# Patient Record
Sex: FEMALE | Race: BLACK OR AFRICAN AMERICAN | NOT HISPANIC OR LATINO | URBAN - METROPOLITAN AREA
[De-identification: names, ages, dates, MRNs, and addresses within clinical notes are randomized per-mention and may not be internally consistent; named-entity substitution may affect disease eponyms.]

---

## 2018-07-09 ENCOUNTER — EMERGENCY (EMERGENCY)
Facility: HOSPITAL | Age: 78
LOS: 1 days | Discharge: HOME | End: 2018-07-09
Attending: EMERGENCY MEDICINE | Admitting: EMERGENCY MEDICINE
Payer: MEDICARE

## 2018-07-09 VITALS
HEART RATE: 106 BPM | SYSTOLIC BLOOD PRESSURE: 153 MMHG | RESPIRATION RATE: 18 BRPM | OXYGEN SATURATION: 100 % | DIASTOLIC BLOOD PRESSURE: 83 MMHG

## 2018-07-09 VITALS
SYSTOLIC BLOOD PRESSURE: 171 MMHG | HEART RATE: 102 BPM | OXYGEN SATURATION: 100 % | RESPIRATION RATE: 16 BRPM | DIASTOLIC BLOOD PRESSURE: 69 MMHG | TEMPERATURE: 98 F

## 2018-07-09 LAB
ALBUMIN SERPL ELPH-MCNC: 4.7 G/DL — SIGNIFICANT CHANGE UP (ref 3.3–5)
ALP SERPL-CCNC: 57 U/L — SIGNIFICANT CHANGE UP (ref 40–120)
ALT FLD-CCNC: 84 U/L — HIGH (ref 4–33)
APPEARANCE UR: SIGNIFICANT CHANGE UP
AST SERPL-CCNC: 57 U/L — HIGH (ref 4–32)
BACTERIA # UR AUTO: HIGH
BASE EXCESS BLDV CALC-SCNC: 4 MMOL/L — SIGNIFICANT CHANGE UP
BASOPHILS # BLD AUTO: 0.03 K/UL — SIGNIFICANT CHANGE UP (ref 0–0.2)
BASOPHILS NFR BLD AUTO: 0.4 % — SIGNIFICANT CHANGE UP (ref 0–2)
BILIRUB SERPL-MCNC: 0.6 MG/DL — SIGNIFICANT CHANGE UP (ref 0.2–1.2)
BILIRUB UR-MCNC: NEGATIVE — SIGNIFICANT CHANGE UP
BLOOD GAS VENOUS - CREATININE: 0.91 MG/DL — SIGNIFICANT CHANGE UP (ref 0.5–1.3)
BLOOD UR QL VISUAL: HIGH
BUN SERPL-MCNC: 33 MG/DL — HIGH (ref 7–23)
CALCIUM SERPL-MCNC: 10.2 MG/DL — SIGNIFICANT CHANGE UP (ref 8.4–10.5)
CHLORIDE BLDV-SCNC: 99 MMOL/L — SIGNIFICANT CHANGE UP (ref 96–108)
CHLORIDE SERPL-SCNC: 93 MMOL/L — LOW (ref 98–107)
CO2 SERPL-SCNC: 25 MMOL/L — SIGNIFICANT CHANGE UP (ref 22–31)
COLOR SPEC: YELLOW — SIGNIFICANT CHANGE UP
CREAT SERPL-MCNC: 0.98 MG/DL — SIGNIFICANT CHANGE UP (ref 0.5–1.3)
EOSINOPHIL # BLD AUTO: 0 K/UL — SIGNIFICANT CHANGE UP (ref 0–0.5)
EOSINOPHIL NFR BLD AUTO: 0 % — SIGNIFICANT CHANGE UP (ref 0–6)
GAS PNL BLDV: 135 MMOL/L — LOW (ref 136–146)
GLUCOSE BLDV-MCNC: 208 — HIGH (ref 70–99)
GLUCOSE SERPL-MCNC: 212 MG/DL — HIGH (ref 70–99)
GLUCOSE UR-MCNC: SIGNIFICANT CHANGE UP
HCO3 BLDV-SCNC: 28 MMOL/L — HIGH (ref 20–27)
HCT VFR BLD CALC: 41.4 % — SIGNIFICANT CHANGE UP (ref 34.5–45)
HCT VFR BLDV CALC: 45 % — SIGNIFICANT CHANGE UP (ref 34.5–45)
HGB BLD-MCNC: 13.9 G/DL — SIGNIFICANT CHANGE UP (ref 11.5–15.5)
HGB BLDV-MCNC: 14.7 G/DL — SIGNIFICANT CHANGE UP (ref 11.5–15.5)
IMM GRANULOCYTES # BLD AUTO: 0.04 # — SIGNIFICANT CHANGE UP
IMM GRANULOCYTES NFR BLD AUTO: 0.5 % — SIGNIFICANT CHANGE UP (ref 0–1.5)
KETONES UR-MCNC: SIGNIFICANT CHANGE UP
LACTATE BLDV-MCNC: 2.1 MMOL/L — HIGH (ref 0.5–2)
LEUKOCYTE ESTERASE UR-ACNC: HIGH
LIDOCAIN IGE QN: 15.7 U/L — SIGNIFICANT CHANGE UP (ref 7–60)
LYMPHOCYTES # BLD AUTO: 1.17 K/UL — SIGNIFICANT CHANGE UP (ref 1–3.3)
LYMPHOCYTES # BLD AUTO: 14.2 % — SIGNIFICANT CHANGE UP (ref 13–44)
MCHC RBC-ENTMCNC: 30.8 PG — SIGNIFICANT CHANGE UP (ref 27–34)
MCHC RBC-ENTMCNC: 33.6 % — SIGNIFICANT CHANGE UP (ref 32–36)
MCV RBC AUTO: 91.8 FL — SIGNIFICANT CHANGE UP (ref 80–100)
MONOCYTES # BLD AUTO: 0.51 K/UL — SIGNIFICANT CHANGE UP (ref 0–0.9)
MONOCYTES NFR BLD AUTO: 6.2 % — SIGNIFICANT CHANGE UP (ref 2–14)
MUCOUS THREADS # UR AUTO: SIGNIFICANT CHANGE UP
NEUTROPHILS # BLD AUTO: 6.48 K/UL — SIGNIFICANT CHANGE UP (ref 1.8–7.4)
NEUTROPHILS NFR BLD AUTO: 78.7 % — HIGH (ref 43–77)
NITRITE UR-MCNC: NEGATIVE — SIGNIFICANT CHANGE UP
NON-SQ EPI CELLS # UR AUTO: 2 — SIGNIFICANT CHANGE UP
NRBC # FLD: 0 — SIGNIFICANT CHANGE UP
PCO2 BLDV: 39 MMHG — LOW (ref 41–51)
PH BLDV: 7.46 PH — HIGH (ref 7.32–7.43)
PH UR: 5.5 — SIGNIFICANT CHANGE UP (ref 4.6–8)
PLATELET # BLD AUTO: 335 K/UL — SIGNIFICANT CHANGE UP (ref 150–400)
PMV BLD: 11.2 FL — SIGNIFICANT CHANGE UP (ref 7–13)
PO2 BLDV: 51 MMHG — HIGH (ref 35–40)
POTASSIUM BLDV-SCNC: 3.7 MMOL/L — SIGNIFICANT CHANGE UP (ref 3.4–4.5)
POTASSIUM SERPL-MCNC: 3.5 MMOL/L — SIGNIFICANT CHANGE UP (ref 3.5–5.3)
POTASSIUM SERPL-SCNC: 3.5 MMOL/L — SIGNIFICANT CHANGE UP (ref 3.5–5.3)
PROT SERPL-MCNC: 8.5 G/DL — HIGH (ref 6–8.3)
PROT UR-MCNC: 100 MG/DL — HIGH
RBC # BLD: 4.51 M/UL — SIGNIFICANT CHANGE UP (ref 3.8–5.2)
RBC # FLD: 12.3 % — SIGNIFICANT CHANGE UP (ref 10.3–14.5)
RBC CASTS # UR COMP ASSIST: SIGNIFICANT CHANGE UP (ref 0–?)
SAO2 % BLDV: 84.6 % — SIGNIFICANT CHANGE UP (ref 60–85)
SODIUM SERPL-SCNC: 135 MMOL/L — SIGNIFICANT CHANGE UP (ref 135–145)
SP GR SPEC: 1.03 — SIGNIFICANT CHANGE UP (ref 1–1.04)
TROPONIN T, HIGH SENSITIVITY: 14 NG/L — SIGNIFICANT CHANGE UP (ref ?–14)
UROBILINOGEN FLD QL: 1 MG/DL — SIGNIFICANT CHANGE UP
WBC # BLD: 8.23 K/UL — SIGNIFICANT CHANGE UP (ref 3.8–10.5)
WBC # FLD AUTO: 8.23 K/UL — SIGNIFICANT CHANGE UP (ref 3.8–10.5)
WBC UR QL: SIGNIFICANT CHANGE UP (ref 0–?)

## 2018-07-09 PROCEDURE — 99284 EMERGENCY DEPT VISIT MOD MDM: CPT | Mod: GC

## 2018-07-09 RX ORDER — FAMOTIDINE 10 MG/ML
20 INJECTION INTRAVENOUS ONCE
Qty: 0 | Refills: 0 | Status: COMPLETED | OUTPATIENT
Start: 2018-07-09 | End: 2018-07-09

## 2018-07-09 RX ORDER — ONDANSETRON 8 MG/1
4 TABLET, FILM COATED ORAL ONCE
Qty: 0 | Refills: 0 | Status: COMPLETED | OUTPATIENT
Start: 2018-07-09 | End: 2018-07-09

## 2018-07-09 RX ORDER — SODIUM CHLORIDE 9 MG/ML
1000 INJECTION INTRAMUSCULAR; INTRAVENOUS; SUBCUTANEOUS ONCE
Qty: 0 | Refills: 0 | Status: COMPLETED | OUTPATIENT
Start: 2018-07-09 | End: 2018-07-09

## 2018-07-09 RX ORDER — ACETAMINOPHEN 500 MG
1000 TABLET ORAL ONCE
Qty: 0 | Refills: 0 | Status: COMPLETED | OUTPATIENT
Start: 2018-07-09 | End: 2018-07-09

## 2018-07-09 RX ORDER — CEFTRIAXONE 500 MG/1
1 INJECTION, POWDER, FOR SOLUTION INTRAMUSCULAR; INTRAVENOUS ONCE
Qty: 0 | Refills: 0 | Status: COMPLETED | OUTPATIENT
Start: 2018-07-09 | End: 2018-07-09

## 2018-07-09 RX ADMIN — FAMOTIDINE 20 MILLIGRAM(S): 10 INJECTION INTRAVENOUS at 23:08

## 2018-07-09 RX ADMIN — Medication 1000 MILLIGRAM(S): at 23:01

## 2018-07-09 RX ADMIN — ONDANSETRON 4 MILLIGRAM(S): 8 TABLET, FILM COATED ORAL at 21:36

## 2018-07-09 RX ADMIN — SODIUM CHLORIDE 1000 MILLILITER(S): 9 INJECTION INTRAMUSCULAR; INTRAVENOUS; SUBCUTANEOUS at 21:36

## 2018-07-09 RX ADMIN — Medication 400 MILLIGRAM(S): at 21:36

## 2018-07-09 RX ADMIN — Medication 30 MILLILITER(S): at 23:08

## 2018-07-09 NOTE — ED PROVIDER NOTE - ATTENDING CONTRIBUTION TO CARE
Attending note:   After face to face evaluation of this patient, I concur with above noted hx, pe, and care plan for this patient.   78 y/o F with suprapubic pain and dysuria for several days along with nausea and rare gagging.   Abd soft except  suprapubic area.    evaluation in progress

## 2018-07-09 NOTE — ED PROVIDER NOTE - MEDICAL DECISION MAKING DETAILS
77F with vomiting, diarrhea and suprapubic pain on exam r/o uti. check labs, ivf, zofran, pepcid maalox.

## 2018-07-09 NOTE — ED ADULT NURSE NOTE - OBJECTIVE STATEMENT
Pt received in room 12, a/o x3. Pt comes in for c/o LUQ pain, lower abd pain and nvd for the last 4-5 days. Pt states inability to eat d/t she cant keep anything down, appears weak in room and just does not feel well. Pt appears in no acute distress, lower abd pain noted upon MD evaluation of pt and pt reporting that she has had some burning with urination which is not normal for her. IV access placed to L wrist 20G, labs sent and pt medicated per order. Pt currently awaiting further MD evaluation and results, will monitor and await further MD orders.

## 2018-07-09 NOTE — ED PROVIDER NOTE - OBJECTIVE STATEMENT
77F with hx of DM, HTN, HLD p/w vomiting for the past 4-5 days. patient c/o LUQ pain, epigastric pain. denies fever, chills, dysuria. last BM today, loose, watery and brown. no recent abx use. 77F with hx of DM, HTN, HLD p/w vomiting for the past 4-5 days. patient c/o LUQ pain, epigastric pain. denies fever, chills, dysuria. last BM today, loose, watery and brown. no recent abx use..

## 2018-07-10 LAB
BASE EXCESS BLDV CALC-SCNC: 3.8 MMOL/L — SIGNIFICANT CHANGE UP
BLOOD GAS VENOUS - CREATININE: 1.05 MG/DL — SIGNIFICANT CHANGE UP (ref 0.5–1.3)
CHLORIDE BLDV-SCNC: 101 MMOL/L — SIGNIFICANT CHANGE UP (ref 96–108)
GAS PNL BLDV: 134 MMOL/L — LOW (ref 136–146)
GLUCOSE BLDV-MCNC: 176 — HIGH (ref 70–99)
HCO3 BLDV-SCNC: 27 MMOL/L — SIGNIFICANT CHANGE UP (ref 20–27)
HCT VFR BLDV CALC: 46.9 % — HIGH (ref 34.5–45)
HGB BLDV-MCNC: 15.3 G/DL — SIGNIFICANT CHANGE UP (ref 11.5–15.5)
LACTATE BLDV-MCNC: 1.8 MMOL/L — SIGNIFICANT CHANGE UP (ref 0.5–2)
PCO2 BLDV: 43 MMHG — SIGNIFICANT CHANGE UP (ref 41–51)
PH BLDV: 7.42 PH — SIGNIFICANT CHANGE UP (ref 7.32–7.43)
PO2 BLDV: 45 MMHG — HIGH (ref 35–40)
POTASSIUM BLDV-SCNC: 3.2 MMOL/L — LOW (ref 3.4–4.5)
SAO2 % BLDV: 77.6 % — SIGNIFICANT CHANGE UP (ref 60–85)

## 2018-07-10 RX ORDER — CEPHALEXIN 500 MG
1 CAPSULE ORAL
Qty: 12 | Refills: 0 | OUTPATIENT
Start: 2018-07-10 | End: 2018-07-15

## 2018-07-10 RX ADMIN — CEFTRIAXONE 100 GRAM(S): 500 INJECTION, POWDER, FOR SOLUTION INTRAMUSCULAR; INTRAVENOUS at 00:12

## 2018-07-12 LAB — SPECIMEN SOURCE: SIGNIFICANT CHANGE UP

## 2018-07-13 LAB
-  AMIKACIN: SIGNIFICANT CHANGE UP
-  AMPICILLIN/SULBACTAM: SIGNIFICANT CHANGE UP
-  AMPICILLIN: SIGNIFICANT CHANGE UP
-  AZTREONAM: SIGNIFICANT CHANGE UP
-  CEFAZOLIN: SIGNIFICANT CHANGE UP
-  CEFEPIME: SIGNIFICANT CHANGE UP
-  CEFOXITIN: SIGNIFICANT CHANGE UP
-  CEFTAZIDIME: SIGNIFICANT CHANGE UP
-  CEFTRIAXONE: SIGNIFICANT CHANGE UP
-  CIPROFLOXACIN: SIGNIFICANT CHANGE UP
-  ERTAPENEM: SIGNIFICANT CHANGE UP
-  GENTAMICIN: SIGNIFICANT CHANGE UP
-  IMIPENEM: SIGNIFICANT CHANGE UP
-  LEVOFLOXACIN: SIGNIFICANT CHANGE UP
-  MEROPENEM: SIGNIFICANT CHANGE UP
-  NITROFURANTOIN: SIGNIFICANT CHANGE UP
-  PIPERACILLIN/TAZOBACTAM: SIGNIFICANT CHANGE UP
-  TIGECYCLINE: SIGNIFICANT CHANGE UP
-  TOBRAMYCIN: SIGNIFICANT CHANGE UP
-  TRIMETHOPRIM/SULFAMETHOXAZOLE: SIGNIFICANT CHANGE UP
BACTERIA UR CULT: SIGNIFICANT CHANGE UP
METHOD TYPE: SIGNIFICANT CHANGE UP
ORGANISM # SPEC MICROSCOPIC CNT: SIGNIFICANT CHANGE UP
ORGANISM # SPEC MICROSCOPIC CNT: SIGNIFICANT CHANGE UP

## 2019-04-24 ENCOUNTER — INPATIENT (INPATIENT)
Facility: HOSPITAL | Age: 79
LOS: 11 days | Discharge: NURSING, REHAB OR LONG-TERM CARE | End: 2019-05-06
Attending: SPECIALIST | Admitting: SPECIALIST
Payer: MEDICARE

## 2019-04-24 VITALS
SYSTOLIC BLOOD PRESSURE: 137 MMHG | HEART RATE: 125 BPM | OXYGEN SATURATION: 99 % | RESPIRATION RATE: 20 BRPM | TEMPERATURE: 98 F | DIASTOLIC BLOOD PRESSURE: 87 MMHG

## 2019-04-24 DIAGNOSIS — K56.609 UNSPECIFIED INTESTINAL OBSTRUCTION, UNSPECIFIED AS TO PARTIAL VERSUS COMPLETE OBSTRUCTION: ICD-10-CM

## 2019-04-24 PROBLEM — E78.5 HYPERLIPIDEMIA, UNSPECIFIED: Chronic | Status: ACTIVE | Noted: 2018-07-09

## 2019-04-24 PROBLEM — E11.9 TYPE 2 DIABETES MELLITUS WITHOUT COMPLICATIONS: Chronic | Status: ACTIVE | Noted: 2018-07-09

## 2019-04-24 PROBLEM — I10 ESSENTIAL (PRIMARY) HYPERTENSION: Chronic | Status: ACTIVE | Noted: 2018-07-09

## 2019-04-24 LAB
ALBUMIN SERPL ELPH-MCNC: 4 G/DL — SIGNIFICANT CHANGE UP (ref 3.3–5)
ALP SERPL-CCNC: 59 U/L — SIGNIFICANT CHANGE UP (ref 40–120)
ALT FLD-CCNC: 11 U/L — SIGNIFICANT CHANGE UP (ref 4–33)
ANION GAP SERPL CALC-SCNC: 19 MMO/L — HIGH (ref 7–14)
ANISOCYTOSIS BLD QL: SLIGHT — SIGNIFICANT CHANGE UP
APPEARANCE UR: SIGNIFICANT CHANGE UP
APTT BLD: 26.3 SEC — LOW (ref 27.5–36.3)
AST SERPL-CCNC: 14 U/L — SIGNIFICANT CHANGE UP (ref 4–32)
BACTERIA # UR AUTO: HIGH
BASE EXCESS BLDV CALC-SCNC: 5.4 MMOL/L — SIGNIFICANT CHANGE UP
BASE EXCESS BLDV CALC-SCNC: 8.6 MMOL/L — SIGNIFICANT CHANGE UP
BASOPHILS # BLD AUTO: 0.03 K/UL — SIGNIFICANT CHANGE UP (ref 0–0.2)
BASOPHILS # BLD AUTO: 0.03 K/UL — SIGNIFICANT CHANGE UP (ref 0–0.2)
BASOPHILS NFR BLD AUTO: 0.7 % — SIGNIFICANT CHANGE UP (ref 0–2)
BASOPHILS NFR BLD AUTO: 0.9 % — SIGNIFICANT CHANGE UP (ref 0–2)
BASOPHILS NFR SPEC: 0.9 % — SIGNIFICANT CHANGE UP (ref 0–2)
BILIRUB SERPL-MCNC: 0.5 MG/DL — SIGNIFICANT CHANGE UP (ref 0.2–1.2)
BILIRUB UR-MCNC: SIGNIFICANT CHANGE UP
BLASTS # FLD: 0 % — SIGNIFICANT CHANGE UP (ref 0–0)
BLD GP AB SCN SERPL QL: NEGATIVE — SIGNIFICANT CHANGE UP
BLOOD GAS VENOUS - CREATININE: 0.79 MG/DL — SIGNIFICANT CHANGE UP (ref 0.5–1.3)
BLOOD GAS VENOUS - CREATININE: 0.9 MG/DL — SIGNIFICANT CHANGE UP (ref 0.5–1.3)
BLOOD UR QL VISUAL: NEGATIVE — SIGNIFICANT CHANGE UP
BUN SERPL-MCNC: 37 MG/DL — HIGH (ref 7–23)
CALCIUM SERPL-MCNC: 9.5 MG/DL — SIGNIFICANT CHANGE UP (ref 8.4–10.5)
CHLORIDE BLDV-SCNC: 90 MMOL/L — LOW (ref 96–108)
CHLORIDE BLDV-SCNC: 92 MMOL/L — LOW (ref 96–108)
CHLORIDE SERPL-SCNC: 86 MMOL/L — LOW (ref 98–107)
CO2 SERPL-SCNC: 27 MMOL/L — SIGNIFICANT CHANGE UP (ref 22–31)
COLOR SPEC: YELLOW — SIGNIFICANT CHANGE UP
CREAT SERPL-MCNC: 0.9 MG/DL — SIGNIFICANT CHANGE UP (ref 0.5–1.3)
EOSINOPHIL # BLD AUTO: 0.01 K/UL — SIGNIFICANT CHANGE UP (ref 0–0.5)
EOSINOPHIL # BLD AUTO: 0.02 K/UL — SIGNIFICANT CHANGE UP (ref 0–0.5)
EOSINOPHIL NFR BLD AUTO: 0.3 % — SIGNIFICANT CHANGE UP (ref 0–6)
EOSINOPHIL NFR BLD AUTO: 0.5 % — SIGNIFICANT CHANGE UP (ref 0–6)
EOSINOPHIL NFR FLD: 0.9 % — SIGNIFICANT CHANGE UP (ref 0–6)
GAS PNL BLDV: 126 MMOL/L — LOW (ref 136–146)
GAS PNL BLDV: 132 MMOL/L — LOW (ref 136–146)
GIANT PLATELETS BLD QL SMEAR: PRESENT — SIGNIFICANT CHANGE UP
GLUCOSE BLDC GLUCOMTR-MCNC: 134 MG/DL — HIGH (ref 70–99)
GLUCOSE BLDV-MCNC: 117 — HIGH (ref 70–99)
GLUCOSE BLDV-MCNC: 187 — HIGH (ref 70–99)
GLUCOSE SERPL-MCNC: 183 MG/DL — HIGH (ref 70–99)
GLUCOSE UR-MCNC: NEGATIVE — SIGNIFICANT CHANGE UP
HCO3 BLDV-SCNC: 28 MMOL/L — HIGH (ref 20–27)
HCO3 BLDV-SCNC: 30 MMOL/L — HIGH (ref 20–27)
HCT VFR BLD CALC: 40.6 % — SIGNIFICANT CHANGE UP (ref 34.5–45)
HCT VFR BLD CALC: 45.4 % — HIGH (ref 34.5–45)
HCT VFR BLDV CALC: 40.3 % — SIGNIFICANT CHANGE UP (ref 34.5–45)
HCT VFR BLDV CALC: 46.2 % — HIGH (ref 34.5–45)
HGB BLD-MCNC: 13.5 G/DL — SIGNIFICANT CHANGE UP (ref 11.5–15.5)
HGB BLD-MCNC: 15 G/DL — SIGNIFICANT CHANGE UP (ref 11.5–15.5)
HGB BLDV-MCNC: 13.1 G/DL — SIGNIFICANT CHANGE UP (ref 11.5–15.5)
HGB BLDV-MCNC: 15.1 G/DL — SIGNIFICANT CHANGE UP (ref 11.5–15.5)
HYALINE CASTS # UR AUTO: HIGH
IMM GRANULOCYTES NFR BLD AUTO: 0 % — SIGNIFICANT CHANGE UP (ref 0–1.5)
IMM GRANULOCYTES NFR BLD AUTO: 0.3 % — SIGNIFICANT CHANGE UP (ref 0–1.5)
INR BLD: 1.03 — SIGNIFICANT CHANGE UP (ref 0.88–1.17)
KETONES UR-MCNC: HIGH
LACTATE BLDV-MCNC: 1.4 MMOL/L — SIGNIFICANT CHANGE UP (ref 0.5–2)
LACTATE BLDV-MCNC: 2.9 MMOL/L — HIGH (ref 0.5–2)
LEUKOCYTE ESTERASE UR-ACNC: NEGATIVE — SIGNIFICANT CHANGE UP
LYMPHOCYTES # BLD AUTO: 1 K/UL — SIGNIFICANT CHANGE UP (ref 1–3.3)
LYMPHOCYTES # BLD AUTO: 1.11 K/UL — SIGNIFICANT CHANGE UP (ref 1–3.3)
LYMPHOCYTES # BLD AUTO: 25.4 % — SIGNIFICANT CHANGE UP (ref 13–44)
LYMPHOCYTES # BLD AUTO: 29.3 % — SIGNIFICANT CHANGE UP (ref 13–44)
LYMPHOCYTES NFR SPEC AUTO: 25.6 % — SIGNIFICANT CHANGE UP (ref 13–44)
MACROCYTES BLD QL: SLIGHT — SIGNIFICANT CHANGE UP
MCHC RBC-ENTMCNC: 30.7 PG — SIGNIFICANT CHANGE UP (ref 27–34)
MCHC RBC-ENTMCNC: 31.3 PG — SIGNIFICANT CHANGE UP (ref 27–34)
MCHC RBC-ENTMCNC: 33 % — SIGNIFICANT CHANGE UP (ref 32–36)
MCHC RBC-ENTMCNC: 33.3 % — SIGNIFICANT CHANGE UP (ref 32–36)
MCV RBC AUTO: 93 FL — SIGNIFICANT CHANGE UP (ref 80–100)
MCV RBC AUTO: 94 FL — SIGNIFICANT CHANGE UP (ref 80–100)
METAMYELOCYTES # FLD: 0 % — SIGNIFICANT CHANGE UP (ref 0–1)
MONOCYTES # BLD AUTO: 0.57 K/UL — SIGNIFICANT CHANGE UP (ref 0–0.9)
MONOCYTES # BLD AUTO: 0.7 K/UL — SIGNIFICANT CHANGE UP (ref 0–0.9)
MONOCYTES NFR BLD AUTO: 16 % — HIGH (ref 2–14)
MONOCYTES NFR BLD AUTO: 16.7 % — HIGH (ref 2–14)
MONOCYTES NFR BLD: 19.5 % — HIGH (ref 2–9)
MYELOCYTES NFR BLD: 0 % — SIGNIFICANT CHANGE UP (ref 0–0)
NEUTROPHIL AB SER-ACNC: 19.5 % — LOW (ref 43–77)
NEUTROPHILS # BLD AUTO: 1.79 K/UL — LOW (ref 1.8–7.4)
NEUTROPHILS # BLD AUTO: 2.51 K/UL — SIGNIFICANT CHANGE UP (ref 1.8–7.4)
NEUTROPHILS NFR BLD AUTO: 52.5 % — SIGNIFICANT CHANGE UP (ref 43–77)
NEUTROPHILS NFR BLD AUTO: 57.4 % — SIGNIFICANT CHANGE UP (ref 43–77)
NEUTS BAND # BLD: 29.2 % — HIGH (ref 0–6)
NITRITE UR-MCNC: POSITIVE — HIGH
NRBC # FLD: 0 K/UL — SIGNIFICANT CHANGE UP (ref 0–0)
NRBC # FLD: 0 K/UL — SIGNIFICANT CHANGE UP (ref 0–0)
OTHER - HEMATOLOGY %: 0 — SIGNIFICANT CHANGE UP
OVALOCYTES BLD QL SMEAR: SIGNIFICANT CHANGE UP
PCO2 BLDV: 46 MMHG — SIGNIFICANT CHANGE UP (ref 41–51)
PCO2 BLDV: 49 MMHG — SIGNIFICANT CHANGE UP (ref 41–51)
PH BLDV: 7.4 PH — SIGNIFICANT CHANGE UP (ref 7.32–7.43)
PH BLDV: 7.47 PH — HIGH (ref 7.32–7.43)
PH UR: 6 — SIGNIFICANT CHANGE UP (ref 5–8)
PLATELET # BLD AUTO: 353 K/UL — SIGNIFICANT CHANGE UP (ref 150–400)
PLATELET # BLD AUTO: 431 K/UL — HIGH (ref 150–400)
PLATELET COUNT - ESTIMATE: NORMAL — SIGNIFICANT CHANGE UP
PMV BLD: 10.4 FL — SIGNIFICANT CHANGE UP (ref 7–13)
PMV BLD: 11 FL — SIGNIFICANT CHANGE UP (ref 7–13)
PO2 BLDV: 48 MMHG — HIGH (ref 35–40)
PO2 BLDV: < 24 MMHG — LOW (ref 35–40)
POIKILOCYTOSIS BLD QL AUTO: SLIGHT — SIGNIFICANT CHANGE UP
POTASSIUM BLDV-SCNC: 3.2 MMOL/L — LOW (ref 3.4–4.5)
POTASSIUM BLDV-SCNC: 3.8 MMOL/L — SIGNIFICANT CHANGE UP (ref 3.4–4.5)
POTASSIUM SERPL-MCNC: 3.7 MMOL/L — SIGNIFICANT CHANGE UP (ref 3.5–5.3)
POTASSIUM SERPL-SCNC: 3.7 MMOL/L — SIGNIFICANT CHANGE UP (ref 3.5–5.3)
PROMYELOCYTES # FLD: 0 % — SIGNIFICANT CHANGE UP (ref 0–0)
PROT SERPL-MCNC: 7.2 G/DL — SIGNIFICANT CHANGE UP (ref 6–8.3)
PROT UR-MCNC: 50 — SIGNIFICANT CHANGE UP
PROTHROM AB SERPL-ACNC: 11.8 SEC — SIGNIFICANT CHANGE UP (ref 9.8–13.1)
RBC # BLD: 4.32 M/UL — SIGNIFICANT CHANGE UP (ref 3.8–5.2)
RBC # BLD: 4.88 M/UL — SIGNIFICANT CHANGE UP (ref 3.8–5.2)
RBC # FLD: 12.5 % — SIGNIFICANT CHANGE UP (ref 10.3–14.5)
RBC # FLD: 12.7 % — SIGNIFICANT CHANGE UP (ref 10.3–14.5)
RBC CASTS # UR COMP ASSIST: HIGH (ref 0–?)
REVIEW TO FOLLOW: YES — SIGNIFICANT CHANGE UP
RH IG SCN BLD-IMP: POSITIVE — SIGNIFICANT CHANGE UP
SAO2 % BLDV: 35.4 % — LOW (ref 60–85)
SAO2 % BLDV: 79.4 % — SIGNIFICANT CHANGE UP (ref 60–85)
SODIUM SERPL-SCNC: 132 MMOL/L — LOW (ref 135–145)
SP GR SPEC: 1.03 — SIGNIFICANT CHANGE UP (ref 1–1.04)
SQUAMOUS # UR AUTO: SIGNIFICANT CHANGE UP
UROBILINOGEN FLD QL: HIGH
VARIANT LYMPHS # BLD: 4.4 % — SIGNIFICANT CHANGE UP
WBC # BLD: 3.41 K/UL — LOW (ref 3.8–10.5)
WBC # BLD: 4.37 K/UL — SIGNIFICANT CHANGE UP (ref 3.8–10.5)
WBC # FLD AUTO: 3.41 K/UL — LOW (ref 3.8–10.5)
WBC # FLD AUTO: 4.37 K/UL — SIGNIFICANT CHANGE UP (ref 3.8–10.5)
WBC UR QL: HIGH (ref 0–?)

## 2019-04-24 PROCEDURE — 74177 CT ABD & PELVIS W/CONTRAST: CPT | Mod: 26

## 2019-04-24 RX ORDER — HEPARIN SODIUM 5000 [USP'U]/ML
5000 INJECTION INTRAVENOUS; SUBCUTANEOUS EVERY 8 HOURS
Qty: 0 | Refills: 0 | Status: DISCONTINUED | OUTPATIENT
Start: 2019-04-24 | End: 2019-04-30

## 2019-04-24 RX ORDER — SODIUM CHLORIDE 9 MG/ML
1000 INJECTION, SOLUTION INTRAVENOUS
Qty: 0 | Refills: 0 | Status: DISCONTINUED | OUTPATIENT
Start: 2019-04-24 | End: 2019-04-26

## 2019-04-24 RX ORDER — PIPERACILLIN AND TAZOBACTAM 4; .5 G/20ML; G/20ML
3.38 INJECTION, POWDER, LYOPHILIZED, FOR SOLUTION INTRAVENOUS EVERY 8 HOURS
Qty: 0 | Refills: 0 | Status: COMPLETED | OUTPATIENT
Start: 2019-04-24 | End: 2019-04-27

## 2019-04-24 RX ORDER — INSULIN LISPRO 100/ML
VIAL (ML) SUBCUTANEOUS EVERY 6 HOURS
Qty: 0 | Refills: 0 | Status: DISCONTINUED | OUTPATIENT
Start: 2019-04-24 | End: 2019-05-02

## 2019-04-24 RX ORDER — PIPERACILLIN AND TAZOBACTAM 4; .5 G/20ML; G/20ML
3.38 INJECTION, POWDER, LYOPHILIZED, FOR SOLUTION INTRAVENOUS ONCE
Qty: 0 | Refills: 0 | Status: COMPLETED | OUTPATIENT
Start: 2019-04-24 | End: 2019-04-24

## 2019-04-24 RX ORDER — SODIUM CHLORIDE 9 MG/ML
1000 INJECTION INTRAMUSCULAR; INTRAVENOUS; SUBCUTANEOUS ONCE
Qty: 0 | Refills: 0 | Status: COMPLETED | OUTPATIENT
Start: 2019-04-24 | End: 2019-04-24

## 2019-04-24 RX ORDER — INFLUENZA VIRUS VACCINE 15; 15; 15; 15 UG/.5ML; UG/.5ML; UG/.5ML; UG/.5ML
0.5 SUSPENSION INTRAMUSCULAR ONCE
Qty: 0 | Refills: 0 | Status: DISCONTINUED | OUTPATIENT
Start: 2019-04-24 | End: 2019-05-06

## 2019-04-24 RX ORDER — DEXTROSE 50 % IN WATER 50 %
12.5 SYRINGE (ML) INTRAVENOUS ONCE
Qty: 0 | Refills: 0 | Status: DISCONTINUED | OUTPATIENT
Start: 2019-04-24 | End: 2019-04-27

## 2019-04-24 RX ORDER — SODIUM CHLORIDE 9 MG/ML
1000 INJECTION, SOLUTION INTRAVENOUS
Qty: 0 | Refills: 0 | Status: DISCONTINUED | OUTPATIENT
Start: 2019-04-24 | End: 2019-04-27

## 2019-04-24 RX ORDER — METOPROLOL TARTRATE 50 MG
5 TABLET ORAL EVERY 6 HOURS
Qty: 0 | Refills: 0 | Status: DISCONTINUED | OUTPATIENT
Start: 2019-04-24 | End: 2019-05-03

## 2019-04-24 RX ORDER — DEXTROSE 50 % IN WATER 50 %
15 SYRINGE (ML) INTRAVENOUS ONCE
Qty: 0 | Refills: 0 | Status: DISCONTINUED | OUTPATIENT
Start: 2019-04-24 | End: 2019-04-27

## 2019-04-24 RX ORDER — MORPHINE SULFATE 50 MG/1
4 CAPSULE, EXTENDED RELEASE ORAL ONCE
Qty: 0 | Refills: 0 | Status: DISCONTINUED | OUTPATIENT
Start: 2019-04-24 | End: 2019-04-24

## 2019-04-24 RX ORDER — ONDANSETRON 8 MG/1
4 TABLET, FILM COATED ORAL ONCE
Qty: 0 | Refills: 0 | Status: COMPLETED | OUTPATIENT
Start: 2019-04-24 | End: 2019-04-24

## 2019-04-24 RX ORDER — DEXTROSE 50 % IN WATER 50 %
25 SYRINGE (ML) INTRAVENOUS ONCE
Qty: 0 | Refills: 0 | Status: DISCONTINUED | OUTPATIENT
Start: 2019-04-24 | End: 2019-04-27

## 2019-04-24 RX ORDER — GLUCAGON INJECTION, SOLUTION 0.5 MG/.1ML
1 INJECTION, SOLUTION SUBCUTANEOUS ONCE
Qty: 0 | Refills: 0 | Status: DISCONTINUED | OUTPATIENT
Start: 2019-04-24 | End: 2019-04-27

## 2019-04-24 RX ADMIN — PIPERACILLIN AND TAZOBACTAM 25 GRAM(S): 4; .5 INJECTION, POWDER, LYOPHILIZED, FOR SOLUTION INTRAVENOUS at 21:58

## 2019-04-24 RX ADMIN — HEPARIN SODIUM 5000 UNIT(S): 5000 INJECTION INTRAVENOUS; SUBCUTANEOUS at 21:54

## 2019-04-24 RX ADMIN — ONDANSETRON 4 MILLIGRAM(S): 8 TABLET, FILM COATED ORAL at 12:06

## 2019-04-24 RX ADMIN — SODIUM CHLORIDE 1000 MILLILITER(S): 9 INJECTION INTRAMUSCULAR; INTRAVENOUS; SUBCUTANEOUS at 12:06

## 2019-04-24 RX ADMIN — PIPERACILLIN AND TAZOBACTAM 200 GRAM(S): 4; .5 INJECTION, POWDER, LYOPHILIZED, FOR SOLUTION INTRAVENOUS at 13:11

## 2019-04-24 RX ADMIN — MORPHINE SULFATE 4 MILLIGRAM(S): 50 CAPSULE, EXTENDED RELEASE ORAL at 17:33

## 2019-04-24 NOTE — ED ADULT NURSE REASSESSMENT NOTE - NS ED NURSE REASSESS COMMENT FT1
Patient had NGT placed by YANDEL Reyes. NGT to low-wall suction, via right nares. Patient given morphine 4mg for pain relief. Patient awaiting surgery consult, will continue to monitor patient.

## 2019-04-24 NOTE — ED PROVIDER NOTE - OBJECTIVE STATEMENT
Pt is 77 y/o F nonsmoker PMHx DM, HTN, HLD p/w abdominal pain x 2 weeks.  Pt notes initially waxing and waning moderate intensity epigastric and left lower abdominal cramping pain for past 2 weeks, becoming more constant and severe up to 8/10 for past 3 days associated with nausea and one episode of nonbloody nonbilious vomiting yesterday.  Pt also notes 2 weeks of burning dysuria.  Pt notes intermittent diarrhea but normal bowel movement today.  Pt denies any fevers, chills, chest pain, SOB, flank pain, back pain, vaginal discharge, vaginal bleeding, lightheadedness.  Pt's daughter notes ~50-60 lb weight loss over several months 2/2 decreased PO intake.

## 2019-04-24 NOTE — H&P ADULT - NSHPLABSRESULTS_GEN_ALL_CORE
13.5   3.41  )-----------( 353      ( 2019 18:58 )             40.6                 PT/INR - ( 2019 12:02 )   PT: 11.8 SEC;   INR: 1.03          PTT - ( 2019 12:02 )  PTT:26.3 SEC        132<L>  |  86<L>  |  37<H>  ----------------------------<  183<H>  3.7   |  27  |  0.90    Ca    9.5      2019 11:40    TPro  7.2  /  Alb  4.0  /  TBili  0.5  /  DBili  x   /  AST  14  /  ALT  11  /  AlkPhos  59        LIVER FUNCTIONS - ( 2019 11:40 )  Alb: 4.0 g/dL / Pro: 7.2 g/dL / ALK PHOS: 59 u/L / ALT: 11 u/L / AST: 14 u/L / GGT: x             Urinalysis Basic - ( 2019 13:20 )    Color: YELLOW / Appearance: Lt TURBID / S.030 / pH: 6.0  Gluc: NEGATIVE / Ketone: MODERATE  / Bili: TRACE / Urobili: SMALL   Blood: NEGATIVE / Protein: 50 / Nitrite: POSITIVE   Leuk Esterase: NEGATIVE / RBC: 6-10 / WBC 6-10   Sq Epi: FEW / Non Sq Epi: x / Bacteria: MANY        IMAGING  < from: CT Abdomen and Pelvis w/ Oral Cont and w/ IV Cont (19 @ 14:47) >    IMPRESSION:  Diffuse small bowel dilatation and fluid-filled distention of the colon   to the level of the distal descending colon, possibly large bowel   obstruction with incompetent ileocecal valve. Possible stricture at the   level of the proximal sigmoid colon.    Sigmoid diverticulosis with intraluminal hyperdense material, possibly  active extravasation of contrast in the setting of gastrointestinal   bleed; an alternative consideration is mucosal enhancement in the setting   of colitis.    < end of copied text >

## 2019-04-24 NOTE — H&P ADULT - PROBLEM SELECTOR PLAN 1
- Admit to Surgery  - NPO/IVF  - NGT to LCWS, f/u chest XR to confirm adequate placement.   - Serial abdominal examinations  - Consultation with GI for flexible sigmoidoscopy, +/- biopsy

## 2019-04-24 NOTE — ED ADULT NURSE NOTE - NSIMPLEMENTINTERV_GEN_ALL_ED
Implemented All Fall with Harm Risk Interventions:  [place] to call system. Call bell, personal items and telephone within reach. Instruct patient to call for assistance. Room bathroom lighting operational. Non-slip footwear when patient is off stretcher. Physically safe environment: no spills, clutter or unnecessary equipment. Stretcher in lowest position, wheels locked, appropriate side rails in place. Provide visual cue, wrist band, yellow gown, etc. Monitor gait and stability. Monitor for mental status changes and reorient to person, place, and time. Review medications for side effects contributing to fall risk. Reinforce activity limits and safety measures with patient and family. Provide visual clues: red socks.

## 2019-04-24 NOTE — ED ADULT NURSE NOTE - OBJECTIVE STATEMENT
Patient reports decreased PO intake, nausea/vomiting and abd pain for 1 week. Patient reports last BM yesterday, patient cannot describe BM, "I wasn't paying attention." Patient appears NAD, no cyanosis or palor noted. Patient denies any hx of abd sx. Patient is tachycardic on assessment, placed on continuous cardiac monitor. Patient's labs drawn, #20g placed into right AC, IVF infusing. Patient pending further evaluation by MD.

## 2019-04-24 NOTE — H&P ADULT - HISTORY OF PRESENT ILLNESS
78y female - h/o of DM, HTN, HLD - presenting with crampy abdominal pain, persistent nausea, anorexia, and intermittent vomiting which has been slowly progressing over the preceding two weeks. The patient had her first colonoscopy "a few years ago" by a Dr. Acevedo at [facility]. Based upon the description offered during history-taking, it sounds as though she may have had polyps removed during this colonoscopy, and she was supposed to get a repeat in "3 years." She does report unintentional weight loss over the past several months of roughly 20 lbs. She denies any narrow-caliber stools, any bloody or dark stools. She denies any historical episodes of diverticulitis/unexplained abdominal pain.    Patient is able to tolerate at least 4 METs of activity without any chest pain/SOB or significant fatigue.

## 2019-04-24 NOTE — H&P ADULT - ASSESSMENT
78y female with partially obstructing lesion of the sigmoid colon - mass vs. diverticular stricture. Abdomen is benign, patient is non-toxic.

## 2019-04-24 NOTE — ED ADULT TRIAGE NOTE - CHIEF COMPLAINT QUOTE
Pt with increased fatigue , left side abdominal pain x 3 days nausea and vomiting  , not eating . Pt tachycardiac in triage.

## 2019-04-24 NOTE — ED PROVIDER NOTE - CLINICAL SUMMARY MEDICAL DECISION MAKING FREE TEXT BOX
Pt is 77 y/o F nonsmoker PMHx DM, HTN, HLD p/w abdominal pain x 2 weeks -- possible diverticulitis, possible gastroenteritis, possible UTI -- labs, ua, ucx, iv fluids, ct abd and pelvis

## 2019-04-24 NOTE — H&P ADULT - NSHPPHYSICALEXAM_GEN_ALL_CORE
Vital Signs Last 24 Hrs  T(C): 36.5 (25 Apr 2019 01:50), Max: 36.6 (24 Apr 2019 11:12)  T(F): 97.7 (25 Apr 2019 01:50), Max: 97.9 (24 Apr 2019 11:12)  HR: 76 (25 Apr 2019 01:50) (76 - 125)  BP: 117/60 (25 Apr 2019 01:50) (115/62 - 144/57)  BP(mean): --  RR: 19 (25 Apr 2019 01:50) (18 - 20)  SpO2: 94% (25 Apr 2019 01:50) (94% - 100%)    GEN: NAD, alert and oriented x 3  HEENT: WNL  CHEST: Symmetrical chest rise, no increased work of breathing, no stridor  HEART: RRR, non-muffled heart sounds  ABD: Softly distended, non-tender, no involuntary guarding.  EXT: No erythema/edema. Warm, sensate, motor function intact

## 2019-04-24 NOTE — ED PROVIDER NOTE - ATTENDING CONTRIBUTION TO CARE
78F h/o DM, HTN, HLD presents with intermittent LLQ pain and epigastric abd pain. Reports nausea and one episode of brown emesis. Normal BM yesterday. No fever. + dysuria. Reports decreased PO intake for several months. On exam well appearing, nad, mmm, rrr, lungs clear, abd LLQ ttp, 2+ pulses, no edema, no rash, alert, speech clear. Plan for labs, UA and CT abd.

## 2019-04-24 NOTE — ED PROVIDER NOTE - PROGRESS NOTE DETAILS
YANDEL HOYT:  General surgery consulted for concern for large bowel obstruction who recommends NG tube at this time. Arnulfo:  Patient signed out to me pending surgery eval.  Surgery will admit patient.

## 2019-04-25 DIAGNOSIS — Z71.89 OTHER SPECIFIED COUNSELING: ICD-10-CM

## 2019-04-25 DIAGNOSIS — I10 ESSENTIAL (PRIMARY) HYPERTENSION: ICD-10-CM

## 2019-04-25 DIAGNOSIS — K56.609 UNSPECIFIED INTESTINAL OBSTRUCTION, UNSPECIFIED AS TO PARTIAL VERSUS COMPLETE OBSTRUCTION: ICD-10-CM

## 2019-04-25 DIAGNOSIS — E11.9 TYPE 2 DIABETES MELLITUS WITHOUT COMPLICATIONS: ICD-10-CM

## 2019-04-25 DIAGNOSIS — K56.690 OTHER PARTIAL INTESTINAL OBSTRUCTION: ICD-10-CM

## 2019-04-25 LAB
ANION GAP SERPL CALC-SCNC: 11 MMO/L — SIGNIFICANT CHANGE UP (ref 7–14)
BUN SERPL-MCNC: 26 MG/DL — HIGH (ref 7–23)
CALCIUM SERPL-MCNC: 8.8 MG/DL — SIGNIFICANT CHANGE UP (ref 8.4–10.5)
CHLORIDE SERPL-SCNC: 92 MMOL/L — LOW (ref 98–107)
CO2 SERPL-SCNC: 33 MMOL/L — HIGH (ref 22–31)
CREAT SERPL-MCNC: 0.82 MG/DL — SIGNIFICANT CHANGE UP (ref 0.5–1.3)
GLUCOSE BLDC GLUCOMTR-MCNC: 106 MG/DL — HIGH (ref 70–99)
GLUCOSE BLDC GLUCOMTR-MCNC: 109 MG/DL — HIGH (ref 70–99)
GLUCOSE BLDC GLUCOMTR-MCNC: 113 MG/DL — HIGH (ref 70–99)
GLUCOSE BLDC GLUCOMTR-MCNC: 129 MG/DL — HIGH (ref 70–99)
GLUCOSE SERPL-MCNC: 136 MG/DL — HIGH (ref 70–99)
HBA1C BLD-MCNC: 6.8 % — HIGH (ref 4–5.6)
HCT VFR BLD CALC: 37.3 % — SIGNIFICANT CHANGE UP (ref 34.5–45)
HGB BLD-MCNC: 12.4 G/DL — SIGNIFICANT CHANGE UP (ref 11.5–15.5)
MAGNESIUM SERPL-MCNC: 2 MG/DL — SIGNIFICANT CHANGE UP (ref 1.6–2.6)
MCHC RBC-ENTMCNC: 30.9 PG — SIGNIFICANT CHANGE UP (ref 27–34)
MCHC RBC-ENTMCNC: 33.2 % — SIGNIFICANT CHANGE UP (ref 32–36)
MCV RBC AUTO: 93 FL — SIGNIFICANT CHANGE UP (ref 80–100)
NRBC # FLD: 0.04 K/UL — SIGNIFICANT CHANGE UP (ref 0–0)
NRBC FLD-RTO: 1.1 — SIGNIFICANT CHANGE UP
PLATELET # BLD AUTO: 311 K/UL — SIGNIFICANT CHANGE UP (ref 150–400)
PMV BLD: 10.9 FL — SIGNIFICANT CHANGE UP (ref 7–13)
POTASSIUM SERPL-MCNC: 3.7 MMOL/L — SIGNIFICANT CHANGE UP (ref 3.5–5.3)
POTASSIUM SERPL-SCNC: 3.7 MMOL/L — SIGNIFICANT CHANGE UP (ref 3.5–5.3)
RBC # BLD: 4.01 M/UL — SIGNIFICANT CHANGE UP (ref 3.8–5.2)
RBC # FLD: 12.9 % — SIGNIFICANT CHANGE UP (ref 10.3–14.5)
SODIUM SERPL-SCNC: 136 MMOL/L — SIGNIFICANT CHANGE UP (ref 135–145)
SPECIMEN SOURCE: SIGNIFICANT CHANGE UP
WBC # BLD: 3.62 K/UL — LOW (ref 3.8–10.5)
WBC # FLD AUTO: 3.62 K/UL — LOW (ref 3.8–10.5)

## 2019-04-25 PROCEDURE — 71045 X-RAY EXAM CHEST 1 VIEW: CPT | Mod: 26

## 2019-04-25 RX ORDER — METOPROLOL TARTRATE 50 MG
5 TABLET ORAL EVERY 6 HOURS
Qty: 0 | Refills: 0 | Status: DISCONTINUED | OUTPATIENT
Start: 2019-04-25 | End: 2019-04-25

## 2019-04-25 RX ORDER — ACETAMINOPHEN 500 MG
1000 TABLET ORAL ONCE
Qty: 0 | Refills: 0 | Status: COMPLETED | OUTPATIENT
Start: 2019-04-25 | End: 2019-04-25

## 2019-04-25 RX ADMIN — HEPARIN SODIUM 5000 UNIT(S): 5000 INJECTION INTRAVENOUS; SUBCUTANEOUS at 21:27

## 2019-04-25 RX ADMIN — HEPARIN SODIUM 5000 UNIT(S): 5000 INJECTION INTRAVENOUS; SUBCUTANEOUS at 05:07

## 2019-04-25 RX ADMIN — PIPERACILLIN AND TAZOBACTAM 25 GRAM(S): 4; .5 INJECTION, POWDER, LYOPHILIZED, FOR SOLUTION INTRAVENOUS at 13:07

## 2019-04-25 RX ADMIN — Medication 5 MILLIGRAM(S): at 00:11

## 2019-04-25 RX ADMIN — Medication 5 MILLIGRAM(S): at 23:47

## 2019-04-25 RX ADMIN — PIPERACILLIN AND TAZOBACTAM 25 GRAM(S): 4; .5 INJECTION, POWDER, LYOPHILIZED, FOR SOLUTION INTRAVENOUS at 05:07

## 2019-04-25 RX ADMIN — Medication 5 MILLIGRAM(S): at 12:38

## 2019-04-25 RX ADMIN — Medication 5 MILLIGRAM(S): at 18:10

## 2019-04-25 RX ADMIN — Medication 5 MILLIGRAM(S): at 05:07

## 2019-04-25 RX ADMIN — PIPERACILLIN AND TAZOBACTAM 25 GRAM(S): 4; .5 INJECTION, POWDER, LYOPHILIZED, FOR SOLUTION INTRAVENOUS at 21:27

## 2019-04-25 RX ADMIN — HEPARIN SODIUM 5000 UNIT(S): 5000 INJECTION INTRAVENOUS; SUBCUTANEOUS at 13:29

## 2019-04-25 NOTE — PROVIDER CONTACT NOTE (OTHER) - ACTION/TREATMENT ORDERED:
Dr. Hdz does not want patient to receive Tylenol as it might mask pain associated with obstruction. Patient understood, stated pain was not that severe. Hot packs given.

## 2019-04-25 NOTE — CONSULT NOTE ADULT - SUBJECTIVE AND OBJECTIVE BOX
Chief Complaint:  Patient is a 78y old  Female who presents with a chief complaint of Partial large bowel obstruction (2019 12:49)    DM (diabetes mellitus)  HLD (hyperlipidemia)  HTN (hypertension)  No significant past surgical history     HPI:  78y female with h/o of DM, HTN, HLD presenting with crampy abdominal pain, persistent nausea, anorexia, and intermittent vomiting which has been slowly progressing over the preceding two weeks. The patient had her first colonoscopy "a few years ago" by a Dr. Acevedo at [facility]. Based upon the description offered during history-taking, it sounds as though she may have had polyps removed during this colonoscopy, and she was supposed to get a repeat in "3 years." She does report unintentional weight loss over the past several months of roughly 20 lbs. She denies any narrow-caliber stools, any bloody or dark stools. She denies any historical episodes of diverticulitis/unexplained abdominal pain. GI consulted for possible sigmoid stricture in need of colonic stent and biopsy. The patient at present is no longer with abd pain nausea or vomiting. She reports tolerating her NGT well. States that prior to admission she had a large episode of fecal vomiting and has been with low appetite with minimal po intake over the past 6 months with a 20lb weight loss. Patient reports no melena/hematochezia, no hematemesis, no diarrhea, no dysphagia/odynophagia, no fever/chills. Her last productive bm was on Thursday and was her usual "Ribbon-like" stool; she reports ribbon stool over the past few months. She has never had an EGD in the past. Her last colonoscopy was within the past 5 years by Dr. Cooper with polypectomy and told to return in 3 years for repeat.         No Known Allergies      dextrose 40% Gel 15 Gram(s) Oral once PRN  dextrose 5%. 1000 milliLiter(s) IV Continuous <Continuous>  dextrose 50% Injectable 12.5 Gram(s) IV Push once  dextrose 50% Injectable 25 Gram(s) IV Push once  dextrose 50% Injectable 25 Gram(s) IV Push once  glucagon  Injectable 1 milliGRAM(s) IntraMuscular once PRN  heparin  Injectable 5000 Unit(s) SubCutaneous every 8 hours  influenza   Vaccine 0.5 milliLiter(s) IntraMuscular once  insulin lispro (HumaLOG) corrective regimen sliding scale   SubCutaneous every 6 hours  lactated ringers. 1000 milliLiter(s) IV Continuous <Continuous>  metoprolol tartrate Injectable 5 milliGRAM(s) IV Push every 6 hours  piperacillin/tazobactam IVPB. 3.375 Gram(s) IV Intermittent every 8 hours        FAMILY HISTORY:  No pertinent family history in first degree relatives        Review of Systems:    General:  No wt loss, fevers, chills, night sweats, fatigue  Eyes:  Good vision, no reported pain  ENT:  No sore throat, pain, runny nose, dysphagia  CV:  No pain, palpitations, no lightheadedness  Resp:  No dyspnea, cough, tachypnea, wheezing  GI: as above  :  No pain, bleeding, incontinence, nocturia  Muscle:  No pain, weakness  Neuro:  No weakness, tingling, memory problems  Psych:  No fatigue, insomnia, mood problems, depression  Endocrine:  No polyuria, polydypsia, cold/heat intolerance  Heme:  No petechiae, ecchymosis, easy bruisability  Skin:  No rash, tattoos, scars, edema    Relevant Family History:   n/c    Relevant Social History: n/c      Physical Exam:    Vital Signs:  Vital Signs Last 24 Hrs  T(C): 36.3 (2019 12:35), Max: 37.1 (2019 05:06)  T(F): 97.3 (2019 12:35), Max: 98.8 (2019 05:06)  HR: 72 (2019 12:48) (70 - 107)  BP: 115/62 (2019 12:48) (115/62 - 144/57)  BP(mean): --  RR: 18 (2019 12:48) (17 - 20)  SpO2: 95% (2019 12:48) (94% - 100%)  Daily Height in cm: 157.48 (2019 23:12)    Daily     General:  Appears stated age, well-groomed, nad  HEENT:  NC/AT,  conjunctivae clear and pink, no thyromegaly, nodules, adenopathy, no JVD  Chest:  Full & symmetric excursion, no increased effort, breath sounds clear  Cardiovascular:  Regular rhythm, S1, S2, no murmur/rub/S3/S4, no abdominal bruit, no edema  Abdomen:  Soft, mildly tender, mildly distended, no bowel sounds,  no masses ,no hepatosplenomeagaly, no signs of chronic liver disease ; +ngt  Extremities:  no cyanosis,clubbing or edema  Skin:  No rash/erythema/ecchymoses/petechiae/wounds/abscess/warm/dry  Neuro/Psych:  A&Ox3  , no asterixis, no tremor, no encephalopathy    Laboratory:                            12.4   3.62  )-----------( 311      ( 2019 07:25 )             37.3         136  |  92<L>  |  26<H>  ----------------------------<  136<H>  3.7   |  33<H>  |  0.82    Ca    8.8      2019 07:25  Mg     2.0         TPro  7.2  /  Alb  4.0  /  TBili  0.5  /  DBili  x   /  AST  14  /  ALT  11  /  AlkPhos  59      LIVER FUNCTIONS - ( 2019 11:40 )  Alb: 4.0 g/dL / Pro: 7.2 g/dL / ALK PHOS: 59 u/L / ALT: 11 u/L / AST: 14 u/L / GGT: x           PT/INR - ( 2019 12:02 )   PT: 11.8 SEC;   INR: 1.03          PTT - ( 2019 12:02 )  PTT:26.3 SEC  Urinalysis Basic - ( 2019 13:20 )    Color: YELLOW / Appearance: Lt TURBID / S.030 / pH: 6.0  Gluc: NEGATIVE / Ketone: MODERATE  / Bili: TRACE / Urobili: SMALL   Blood: NEGATIVE / Protein: 50 / Nitrite: POSITIVE   Leuk Esterase: NEGATIVE / RBC: 6-10 / WBC 6-10   Sq Epi: FEW / Non Sq Epi: x / Bacteria: MANY        Imaging:      < from: CT Abdomen and Pelvis w/ Oral Cont and w/ IV Cont (19 @ 14:47) >    EXAM:  CT ABDOMEN AND PELVIS OC IC        PROCEDURE DATE:  2019         INTERPRETATION:  CLINICAL INFORMATION: Epigastric and left-sided   abdominal pain, nausea     COMPARISON: None.    PROCEDURE:   CT of the Abdomen and Pelvis was performed with intravenous contrast.   Intravenous contrast: 90 ml Omnipaque 350. 10 ml discarded.  Oral contrast: None.  Sagittal and coronal reformats were performed.    FINDINGS:    LOWER CHEST: Trace bibasilar linear atelectasis.    LIVER: Within normal limits.  BILE DUCTS: Normal caliber.  GALLBLADDER: Within normal limits.  SPLEEN: Within normal limits.  PANCREAS: Within normal limits.  ADRENALS: Mild thickening of the bilateral adrenal glands with coarse   calcifications of the right lateral limb.  KIDNEYS/URETERS: No hydronephrosis. Symmetric enhancement.    BLADDER: Within normal limits.  REPRODUCTIVE ORGANS: Uterus and adnexa are within normal limits.    BOWEL: Diffuse small bowel dilatation without discrete transition point.   Fluid filled distention of the colon to the level of the distal   descending colon/proximal sigmoid colon where there is an area of   narrowing. Sigmoid diverticulosis with additional scattered colonic   diverticula and hyperdense material in the sigmoid colon which appears   intraluminal. Appendix is not visualized, however there are no secondary   signs of acute appendicitis. Lipoma in the right lateral wall of the   sigmoid colon.  PERITONEUM: Small volume ascites, particularly in the left lower quadrant.  VESSELS:  Atherosclerotic changes.  RETROPERITONEUM: No lymphadenopathy.    ABDOMINAL WALL: Tiny fat-containing umbilical hernia.  BONES: Degenerative changes.    IMPRESSION:  Diffuse small bowel dilatation and fluid-filled distention of the colon   to the level of the distal descending colon, possibly large bowel   obstruction with incompetent ileocecal valve. Possible stricture at the   level of the proximal sigmoid colon.    Sigmoid diverticulosis with intraluminal hyperdense material, possibly  active extravasation of contrast in the setting of gastrointestinal   bleed; an alternative consideration is mucosal enhancement in the setting   of colitis.    Dr. Angela discussed the findings with YANDEL Reyes on 2019 at 4:06   PM.  Readback was obtained.                DAVION BAJWA M.D., RADIOLOGY RESIDENT  This document has been electronically signed.  ALEN ANGELA M.D., ATTENDING RADIOLOGIST  This document has been electronically signed. 2019  4:07PM                  < end of copied text >

## 2019-04-25 NOTE — PROVIDER CONTACT NOTE (OTHER) - BACKGROUND
Patient diagnosed with SBO with past history of diabetes, hyperlipidemia and hypertension. Patient has an NGT to right nare for gastric decompression.

## 2019-04-25 NOTE — PROVIDER CONTACT NOTE (OTHER) - SITUATION
Patient complained of abdominal pain. PA ordered IV Tylenol. Upon administration of Tylenol, Dr. Hdz entered room and stated not to give Tylenol for pain.

## 2019-04-25 NOTE — PROVIDER CONTACT NOTE (OTHER) - ASSESSMENT
Patient A&Ox4, VS stable. R nare NGT resistance felt when flushing tube, no new output noted in cannister. Patient c/o abdominal  and throat discomfort.

## 2019-04-25 NOTE — CONSULT NOTE ADULT - ASSESSMENT
78y female with h/o of DM, HTN, HLD presenting with crampy abdominal pain, persistent nausea, anorexia, and intermittent vomiting which has been slowly progressing over the preceding two weeks found to have a partial LBO with possible sigmoid stricture

## 2019-04-25 NOTE — CONSULT NOTE ADULT - PROBLEM SELECTOR RECOMMENDATION 9
- CT reviewed with LBO +/- sigmoid stricture  - IVF/NPO   - monitor NGT output   - pain control prn /zofran prn   - plan for flex sigmoidoscopy for better visualization   - tap water enema ordered for tonight 10pm and tomorrow am at 6am  - surgery care appreciated - CT reviewed with LBO +/- sigmoid stricture  - IVF/NPO   - monitor NGT output   - pain control prn /zofran prn   - plan for flex sigmoidoscopy w/biopsy +/- stent placement  - tap water enema ordered for tonight 10pm and tomorrow am at 6am  - surgery care appreciated - CT reviewed with LBO +/- sigmoid stricture  - IVF  - NGT to LWS; monitor NGT output   - pain control prn /zofran prn   - NPO  - plan for flex sigmoidoscopy w/biopsy +/- stent placement  - tap water enema ordered for tonight 10pm and tomorrow am at 6am  - surgery care appreciated

## 2019-04-25 NOTE — PROVIDER CONTACT NOTE (OTHER) - SITUATION
R nare NGT resistance felt when flushing tube, no new output noted in cannister. Patient c/o abdominal  and throat discomfort.

## 2019-04-25 NOTE — CONSULT NOTE ADULT - PROBLEM SELECTOR RECOMMENDATION 3
- Advanced care planning was discussed with patient and family.  Advanced care planning forms were reviewed and discussed.  Risks, benefits and alternatives of gastroenterologic procedures were discussed in detail and all questions were answered. 30 minutes spent.

## 2019-04-26 LAB
-  AMIKACIN: SIGNIFICANT CHANGE UP
-  AMPICILLIN/SULBACTAM: SIGNIFICANT CHANGE UP
-  AMPICILLIN: SIGNIFICANT CHANGE UP
-  AZTREONAM: SIGNIFICANT CHANGE UP
-  CEFAZOLIN: SIGNIFICANT CHANGE UP
-  CEFEPIME: SIGNIFICANT CHANGE UP
-  CEFOXITIN: SIGNIFICANT CHANGE UP
-  CEFTAZIDIME: SIGNIFICANT CHANGE UP
-  CEFTRIAXONE: SIGNIFICANT CHANGE UP
-  CIPROFLOXACIN: SIGNIFICANT CHANGE UP
-  ERTAPENEM: SIGNIFICANT CHANGE UP
-  GENTAMICIN: SIGNIFICANT CHANGE UP
-  IMIPENEM: SIGNIFICANT CHANGE UP
-  LEVOFLOXACIN: SIGNIFICANT CHANGE UP
-  MEROPENEM: SIGNIFICANT CHANGE UP
-  NITROFURANTOIN: SIGNIFICANT CHANGE UP
-  PIPERACILLIN/TAZOBACTAM: SIGNIFICANT CHANGE UP
-  TIGECYCLINE: SIGNIFICANT CHANGE UP
-  TOBRAMYCIN: SIGNIFICANT CHANGE UP
-  TRIMETHOPRIM/SULFAMETHOXAZOLE: SIGNIFICANT CHANGE UP
ANION GAP SERPL CALC-SCNC: 14 MMO/L — SIGNIFICANT CHANGE UP (ref 7–14)
BACTERIA UR CULT: SIGNIFICANT CHANGE UP
BUN SERPL-MCNC: 19 MG/DL — SIGNIFICANT CHANGE UP (ref 7–23)
CALCIUM SERPL-MCNC: 8.4 MG/DL — SIGNIFICANT CHANGE UP (ref 8.4–10.5)
CHLORIDE SERPL-SCNC: 93 MMOL/L — LOW (ref 98–107)
CO2 SERPL-SCNC: 27 MMOL/L — SIGNIFICANT CHANGE UP (ref 22–31)
CREAT SERPL-MCNC: 0.67 MG/DL — SIGNIFICANT CHANGE UP (ref 0.5–1.3)
GLUCOSE BLDC GLUCOMTR-MCNC: 117 MG/DL — HIGH (ref 70–99)
GLUCOSE BLDC GLUCOMTR-MCNC: 143 MG/DL — HIGH (ref 70–99)
GLUCOSE BLDC GLUCOMTR-MCNC: 153 MG/DL — HIGH (ref 70–99)
GLUCOSE BLDC GLUCOMTR-MCNC: 170 MG/DL — HIGH (ref 70–99)
GLUCOSE SERPL-MCNC: 108 MG/DL — HIGH (ref 70–99)
HCT VFR BLD CALC: 35.6 % — SIGNIFICANT CHANGE UP (ref 34.5–45)
HGB BLD-MCNC: 11.9 G/DL — SIGNIFICANT CHANGE UP (ref 11.5–15.5)
MAGNESIUM SERPL-MCNC: 1.8 MG/DL — SIGNIFICANT CHANGE UP (ref 1.6–2.6)
MCHC RBC-ENTMCNC: 30.8 PG — SIGNIFICANT CHANGE UP (ref 27–34)
MCHC RBC-ENTMCNC: 33.4 % — SIGNIFICANT CHANGE UP (ref 32–36)
MCV RBC AUTO: 92.2 FL — SIGNIFICANT CHANGE UP (ref 80–100)
METHOD TYPE: SIGNIFICANT CHANGE UP
NRBC # FLD: 0 K/UL — SIGNIFICANT CHANGE UP (ref 0–0)
ORGANISM # SPEC MICROSCOPIC CNT: SIGNIFICANT CHANGE UP
ORGANISM # SPEC MICROSCOPIC CNT: SIGNIFICANT CHANGE UP
PHOSPHATE SERPL-MCNC: 2.2 MG/DL — LOW (ref 2.5–4.5)
PLATELET # BLD AUTO: 288 K/UL — SIGNIFICANT CHANGE UP (ref 150–400)
PMV BLD: 11.2 FL — SIGNIFICANT CHANGE UP (ref 7–13)
POTASSIUM SERPL-MCNC: 2.6 MMOL/L — CRITICAL LOW (ref 3.5–5.3)
POTASSIUM SERPL-MCNC: 3.1 MMOL/L — LOW (ref 3.5–5.3)
POTASSIUM SERPL-SCNC: 2.6 MMOL/L — CRITICAL LOW (ref 3.5–5.3)
POTASSIUM SERPL-SCNC: 3.1 MMOL/L — LOW (ref 3.5–5.3)
RBC # BLD: 3.86 M/UL — SIGNIFICANT CHANGE UP (ref 3.8–5.2)
RBC # FLD: 12.7 % — SIGNIFICANT CHANGE UP (ref 10.3–14.5)
SODIUM SERPL-SCNC: 134 MMOL/L — LOW (ref 135–145)
WBC # BLD: 3.03 K/UL — LOW (ref 3.8–10.5)
WBC # FLD AUTO: 3.03 K/UL — LOW (ref 3.8–10.5)

## 2019-04-26 PROCEDURE — 71045 X-RAY EXAM CHEST 1 VIEW: CPT | Mod: 26

## 2019-04-26 RX ORDER — SOD SULF/SODIUM/NAHCO3/KCL/PEG
4000 SOLUTION, RECONSTITUTED, ORAL ORAL ONCE
Qty: 0 | Refills: 0 | Status: COMPLETED | OUTPATIENT
Start: 2019-04-27 | End: 2019-04-27

## 2019-04-26 RX ORDER — SODIUM CHLORIDE 9 MG/ML
1000 INJECTION, SOLUTION INTRAVENOUS
Qty: 0 | Refills: 0 | Status: DISCONTINUED | OUTPATIENT
Start: 2019-04-26 | End: 2019-04-29

## 2019-04-26 RX ORDER — POTASSIUM CHLORIDE 20 MEQ
10 PACKET (EA) ORAL
Qty: 0 | Refills: 0 | Status: COMPLETED | OUTPATIENT
Start: 2019-04-26 | End: 2019-04-26

## 2019-04-26 RX ORDER — ACETAMINOPHEN 500 MG
1000 TABLET ORAL ONCE
Qty: 0 | Refills: 0 | Status: COMPLETED | OUTPATIENT
Start: 2019-04-26 | End: 2019-04-26

## 2019-04-26 RX ORDER — POTASSIUM PHOSPHATE, MONOBASIC POTASSIUM PHOSPHATE, DIBASIC 236; 224 MG/ML; MG/ML
15 INJECTION, SOLUTION INTRAVENOUS ONCE
Qty: 0 | Refills: 0 | Status: COMPLETED | OUTPATIENT
Start: 2019-04-26 | End: 2019-04-26

## 2019-04-26 RX ADMIN — POTASSIUM PHOSPHATE, MONOBASIC POTASSIUM PHOSPHATE, DIBASIC 62.5 MILLIMOLE(S): 236; 224 INJECTION, SOLUTION INTRAVENOUS at 18:20

## 2019-04-26 RX ADMIN — SODIUM CHLORIDE 125 MILLILITER(S): 9 INJECTION, SOLUTION INTRAVENOUS at 18:19

## 2019-04-26 RX ADMIN — PIPERACILLIN AND TAZOBACTAM 25 GRAM(S): 4; .5 INJECTION, POWDER, LYOPHILIZED, FOR SOLUTION INTRAVENOUS at 14:48

## 2019-04-26 RX ADMIN — Medication 100 MILLIEQUIVALENT(S): at 09:12

## 2019-04-26 RX ADMIN — Medication 5 MILLIGRAM(S): at 18:19

## 2019-04-26 RX ADMIN — PIPERACILLIN AND TAZOBACTAM 25 GRAM(S): 4; .5 INJECTION, POWDER, LYOPHILIZED, FOR SOLUTION INTRAVENOUS at 05:07

## 2019-04-26 RX ADMIN — HEPARIN SODIUM 5000 UNIT(S): 5000 INJECTION INTRAVENOUS; SUBCUTANEOUS at 21:30

## 2019-04-26 RX ADMIN — Medication 100 MILLIEQUIVALENT(S): at 10:30

## 2019-04-26 RX ADMIN — SODIUM CHLORIDE 125 MILLILITER(S): 9 INJECTION, SOLUTION INTRAVENOUS at 14:52

## 2019-04-26 RX ADMIN — Medication 5 MILLIGRAM(S): at 05:07

## 2019-04-26 RX ADMIN — Medication 5 MILLIGRAM(S): at 23:55

## 2019-04-26 RX ADMIN — HEPARIN SODIUM 5000 UNIT(S): 5000 INJECTION INTRAVENOUS; SUBCUTANEOUS at 05:07

## 2019-04-26 RX ADMIN — Medication 1: at 18:17

## 2019-04-26 RX ADMIN — SODIUM CHLORIDE 125 MILLILITER(S): 9 INJECTION, SOLUTION INTRAVENOUS at 09:11

## 2019-04-26 RX ADMIN — Medication 100 MILLIEQUIVALENT(S): at 14:48

## 2019-04-26 RX ADMIN — Medication 400 MILLIGRAM(S): at 23:48

## 2019-04-26 RX ADMIN — HEPARIN SODIUM 5000 UNIT(S): 5000 INJECTION INTRAVENOUS; SUBCUTANEOUS at 14:51

## 2019-04-26 RX ADMIN — PIPERACILLIN AND TAZOBACTAM 25 GRAM(S): 4; .5 INJECTION, POWDER, LYOPHILIZED, FOR SOLUTION INTRAVENOUS at 21:30

## 2019-04-27 LAB
ANION GAP SERPL CALC-SCNC: 12 MMO/L — SIGNIFICANT CHANGE UP (ref 7–14)
BUN SERPL-MCNC: 12 MG/DL — SIGNIFICANT CHANGE UP (ref 7–23)
CALCIUM SERPL-MCNC: 8.5 MG/DL — SIGNIFICANT CHANGE UP (ref 8.4–10.5)
CHLORIDE SERPL-SCNC: 100 MMOL/L — SIGNIFICANT CHANGE UP (ref 98–107)
CO2 SERPL-SCNC: 27 MMOL/L — SIGNIFICANT CHANGE UP (ref 22–31)
CREAT SERPL-MCNC: 0.73 MG/DL — SIGNIFICANT CHANGE UP (ref 0.5–1.3)
GLUCOSE BLDC GLUCOMTR-MCNC: 134 MG/DL — HIGH (ref 70–99)
GLUCOSE BLDC GLUCOMTR-MCNC: 138 MG/DL — HIGH (ref 70–99)
GLUCOSE BLDC GLUCOMTR-MCNC: 138 MG/DL — HIGH (ref 70–99)
GLUCOSE BLDC GLUCOMTR-MCNC: 147 MG/DL — HIGH (ref 70–99)
GLUCOSE SERPL-MCNC: 140 MG/DL — HIGH (ref 70–99)
HCT VFR BLD CALC: 38.5 % — SIGNIFICANT CHANGE UP (ref 34.5–45)
HGB BLD-MCNC: 12.5 G/DL — SIGNIFICANT CHANGE UP (ref 11.5–15.5)
MAGNESIUM SERPL-MCNC: 1.7 MG/DL — SIGNIFICANT CHANGE UP (ref 1.6–2.6)
MCHC RBC-ENTMCNC: 30.3 PG — SIGNIFICANT CHANGE UP (ref 27–34)
MCHC RBC-ENTMCNC: 32.5 % — SIGNIFICANT CHANGE UP (ref 32–36)
MCV RBC AUTO: 93.2 FL — SIGNIFICANT CHANGE UP (ref 80–100)
NRBC # FLD: 0.05 K/UL — SIGNIFICANT CHANGE UP (ref 0–0)
NRBC FLD-RTO: 1.2 — SIGNIFICANT CHANGE UP
PHOSPHATE SERPL-MCNC: 1.9 MG/DL — LOW (ref 2.5–4.5)
PLATELET # BLD AUTO: 315 K/UL — SIGNIFICANT CHANGE UP (ref 150–400)
PMV BLD: 10.8 FL — SIGNIFICANT CHANGE UP (ref 7–13)
POTASSIUM SERPL-MCNC: 3.1 MMOL/L — LOW (ref 3.5–5.3)
POTASSIUM SERPL-SCNC: 3.1 MMOL/L — LOW (ref 3.5–5.3)
RBC # BLD: 4.13 M/UL — SIGNIFICANT CHANGE UP (ref 3.8–5.2)
RBC # FLD: 12.6 % — SIGNIFICANT CHANGE UP (ref 10.3–14.5)
SODIUM SERPL-SCNC: 139 MMOL/L — SIGNIFICANT CHANGE UP (ref 135–145)
WBC # BLD: 4.01 K/UL — SIGNIFICANT CHANGE UP (ref 3.8–10.5)
WBC # FLD AUTO: 4.01 K/UL — SIGNIFICANT CHANGE UP (ref 3.8–10.5)

## 2019-04-27 RX ORDER — POTASSIUM PHOSPHATE, MONOBASIC POTASSIUM PHOSPHATE, DIBASIC 236; 224 MG/ML; MG/ML
15 INJECTION, SOLUTION INTRAVENOUS ONCE
Qty: 0 | Refills: 0 | Status: COMPLETED | OUTPATIENT
Start: 2019-04-27 | End: 2019-04-27

## 2019-04-27 RX ORDER — KETOROLAC TROMETHAMINE 30 MG/ML
15 SYRINGE (ML) INJECTION ONCE
Qty: 0 | Refills: 0 | Status: DISCONTINUED | OUTPATIENT
Start: 2019-04-27 | End: 2019-04-27

## 2019-04-27 RX ORDER — TETRACAINE/BENZOCAINE/BUTAMBEN 2%-14%-2%
1 OINTMENT (GRAM) TOPICAL
Qty: 0 | Refills: 0 | Status: DISCONTINUED | OUTPATIENT
Start: 2019-04-27 | End: 2019-05-02

## 2019-04-27 RX ORDER — ACETAMINOPHEN 500 MG
1000 TABLET ORAL ONCE
Qty: 0 | Refills: 0 | Status: COMPLETED | OUTPATIENT
Start: 2019-04-27 | End: 2019-04-27

## 2019-04-27 RX ADMIN — Medication 1000 MILLIGRAM(S): at 09:58

## 2019-04-27 RX ADMIN — Medication 400 MILLIGRAM(S): at 09:19

## 2019-04-27 RX ADMIN — PIPERACILLIN AND TAZOBACTAM 25 GRAM(S): 4; .5 INJECTION, POWDER, LYOPHILIZED, FOR SOLUTION INTRAVENOUS at 05:07

## 2019-04-27 RX ADMIN — HEPARIN SODIUM 5000 UNIT(S): 5000 INJECTION INTRAVENOUS; SUBCUTANEOUS at 13:05

## 2019-04-27 RX ADMIN — Medication 5 MILLIGRAM(S): at 11:49

## 2019-04-27 RX ADMIN — Medication 1000 MILLIGRAM(S): at 23:46

## 2019-04-27 RX ADMIN — Medication 15 MILLIGRAM(S): at 17:20

## 2019-04-27 RX ADMIN — Medication 5 MILLIGRAM(S): at 23:17

## 2019-04-27 RX ADMIN — PIPERACILLIN AND TAZOBACTAM 25 GRAM(S): 4; .5 INJECTION, POWDER, LYOPHILIZED, FOR SOLUTION INTRAVENOUS at 13:05

## 2019-04-27 RX ADMIN — SODIUM CHLORIDE 125 MILLILITER(S): 9 INJECTION, SOLUTION INTRAVENOUS at 09:20

## 2019-04-27 RX ADMIN — Medication 4000 MILLILITER(S): at 13:19

## 2019-04-27 RX ADMIN — Medication 1000 MILLIGRAM(S): at 00:18

## 2019-04-27 RX ADMIN — HEPARIN SODIUM 5000 UNIT(S): 5000 INJECTION INTRAVENOUS; SUBCUTANEOUS at 05:07

## 2019-04-27 RX ADMIN — PIPERACILLIN AND TAZOBACTAM 25 GRAM(S): 4; .5 INJECTION, POWDER, LYOPHILIZED, FOR SOLUTION INTRAVENOUS at 23:17

## 2019-04-27 RX ADMIN — HEPARIN SODIUM 5000 UNIT(S): 5000 INJECTION INTRAVENOUS; SUBCUTANEOUS at 23:16

## 2019-04-27 RX ADMIN — Medication 15 MILLIGRAM(S): at 16:47

## 2019-04-27 RX ADMIN — Medication 5 MILLIGRAM(S): at 17:39

## 2019-04-27 RX ADMIN — Medication 5 MILLIGRAM(S): at 05:07

## 2019-04-27 RX ADMIN — Medication 400 MILLIGRAM(S): at 23:16

## 2019-04-27 RX ADMIN — POTASSIUM PHOSPHATE, MONOBASIC POTASSIUM PHOSPHATE, DIBASIC 62.5 MILLIMOLE(S): 236; 224 INJECTION, SOLUTION INTRAVENOUS at 15:41

## 2019-04-27 NOTE — PHYSICAL THERAPY INITIAL EVALUATION ADULT - PATIENT PROFILE REVIEW, REHAB EVAL
yes/PT orders received: ambulate as tolerated. Consult with ANNIE MAYORGA, pt may participate in PT evaluation.

## 2019-04-28 ENCOUNTER — TRANSCRIPTION ENCOUNTER (OUTPATIENT)
Age: 79
End: 2019-04-28

## 2019-04-28 LAB
ANION GAP SERPL CALC-SCNC: 14 MMO/L — SIGNIFICANT CHANGE UP (ref 7–14)
BLD GP AB SCN SERPL QL: NEGATIVE — SIGNIFICANT CHANGE UP
BUN SERPL-MCNC: 14 MG/DL — SIGNIFICANT CHANGE UP (ref 7–23)
CALCIUM SERPL-MCNC: 8.3 MG/DL — LOW (ref 8.4–10.5)
CHLORIDE SERPL-SCNC: 106 MMOL/L — SIGNIFICANT CHANGE UP (ref 98–107)
CO2 SERPL-SCNC: 23 MMOL/L — SIGNIFICANT CHANGE UP (ref 22–31)
CREAT SERPL-MCNC: 0.68 MG/DL — SIGNIFICANT CHANGE UP (ref 0.5–1.3)
GLUCOSE BLDC GLUCOMTR-MCNC: 122 MG/DL — HIGH (ref 70–99)
GLUCOSE BLDC GLUCOMTR-MCNC: 135 MG/DL — HIGH (ref 70–99)
GLUCOSE BLDC GLUCOMTR-MCNC: 164 MG/DL — HIGH (ref 70–99)
GLUCOSE BLDC GLUCOMTR-MCNC: 184 MG/DL — HIGH (ref 70–99)
GLUCOSE SERPL-MCNC: 127 MG/DL — HIGH (ref 70–99)
HCT VFR BLD CALC: 39.2 % — SIGNIFICANT CHANGE UP (ref 34.5–45)
HGB BLD-MCNC: 12.8 G/DL — SIGNIFICANT CHANGE UP (ref 11.5–15.5)
MAGNESIUM SERPL-MCNC: 1.6 MG/DL — SIGNIFICANT CHANGE UP (ref 1.6–2.6)
MCHC RBC-ENTMCNC: 31.1 PG — SIGNIFICANT CHANGE UP (ref 27–34)
MCHC RBC-ENTMCNC: 32.7 % — SIGNIFICANT CHANGE UP (ref 32–36)
MCV RBC AUTO: 95.1 FL — SIGNIFICANT CHANGE UP (ref 80–100)
NRBC # FLD: 0 K/UL — SIGNIFICANT CHANGE UP (ref 0–0)
PHOSPHATE SERPL-MCNC: 2 MG/DL — LOW (ref 2.5–4.5)
PLATELET # BLD AUTO: 317 K/UL — SIGNIFICANT CHANGE UP (ref 150–400)
PMV BLD: 10.9 FL — SIGNIFICANT CHANGE UP (ref 7–13)
POTASSIUM SERPL-MCNC: 4 MMOL/L — SIGNIFICANT CHANGE UP (ref 3.5–5.3)
POTASSIUM SERPL-SCNC: 4 MMOL/L — SIGNIFICANT CHANGE UP (ref 3.5–5.3)
RBC # BLD: 4.12 M/UL — SIGNIFICANT CHANGE UP (ref 3.8–5.2)
RBC # FLD: 13.2 % — SIGNIFICANT CHANGE UP (ref 10.3–14.5)
RH IG SCN BLD-IMP: POSITIVE — SIGNIFICANT CHANGE UP
SODIUM SERPL-SCNC: 143 MMOL/L — SIGNIFICANT CHANGE UP (ref 135–145)
WBC # BLD: 3.08 K/UL — LOW (ref 3.8–10.5)
WBC # FLD AUTO: 3.08 K/UL — LOW (ref 3.8–10.5)

## 2019-04-28 PROCEDURE — 99233 SBSQ HOSP IP/OBS HIGH 50: CPT | Mod: GC

## 2019-04-28 RX ORDER — KETOROLAC TROMETHAMINE 30 MG/ML
15 SYRINGE (ML) INJECTION ONCE
Qty: 0 | Refills: 0 | Status: DISCONTINUED | OUTPATIENT
Start: 2019-04-28 | End: 2019-04-28

## 2019-04-28 RX ORDER — KETOROLAC TROMETHAMINE 30 MG/ML
30 SYRINGE (ML) INJECTION ONCE
Qty: 0 | Refills: 0 | Status: DISCONTINUED | OUTPATIENT
Start: 2019-04-28 | End: 2019-04-28

## 2019-04-28 RX ORDER — ACETAMINOPHEN 500 MG
1000 TABLET ORAL ONCE
Qty: 0 | Refills: 0 | Status: COMPLETED | OUTPATIENT
Start: 2019-04-28 | End: 2019-04-28

## 2019-04-28 RX ORDER — HYDROMORPHONE HYDROCHLORIDE 2 MG/ML
0.5 INJECTION INTRAMUSCULAR; INTRAVENOUS; SUBCUTANEOUS ONCE
Qty: 0 | Refills: 0 | Status: DISCONTINUED | OUTPATIENT
Start: 2019-04-28 | End: 2019-04-28

## 2019-04-28 RX ORDER — ONDANSETRON 8 MG/1
4 TABLET, FILM COATED ORAL ONCE
Qty: 0 | Refills: 0 | Status: COMPLETED | OUTPATIENT
Start: 2019-04-28 | End: 2019-04-28

## 2019-04-28 RX ORDER — CALCIUM GLUCONATE 100 MG/ML
1 VIAL (ML) INTRAVENOUS ONCE
Qty: 0 | Refills: 0 | Status: COMPLETED | OUTPATIENT
Start: 2019-04-28 | End: 2019-04-28

## 2019-04-28 RX ORDER — POTASSIUM PHOSPHATE, MONOBASIC POTASSIUM PHOSPHATE, DIBASIC 236; 224 MG/ML; MG/ML
15 INJECTION, SOLUTION INTRAVENOUS ONCE
Qty: 0 | Refills: 0 | Status: COMPLETED | OUTPATIENT
Start: 2019-04-28 | End: 2019-04-28

## 2019-04-28 RX ORDER — KETOROLAC TROMETHAMINE 30 MG/ML
15 SYRINGE (ML) INJECTION ONCE
Qty: 0 | Refills: 0 | Status: DISCONTINUED | OUTPATIENT
Start: 2019-04-28 | End: 2019-04-30

## 2019-04-28 RX ADMIN — Medication 5 MILLIGRAM(S): at 06:00

## 2019-04-28 RX ADMIN — HEPARIN SODIUM 5000 UNIT(S): 5000 INJECTION INTRAVENOUS; SUBCUTANEOUS at 22:27

## 2019-04-28 RX ADMIN — Medication 1000 MILLIGRAM(S): at 13:00

## 2019-04-28 RX ADMIN — Medication 5 MILLIGRAM(S): at 12:27

## 2019-04-28 RX ADMIN — HYDROMORPHONE HYDROCHLORIDE 0.5 MILLIGRAM(S): 2 INJECTION INTRAMUSCULAR; INTRAVENOUS; SUBCUTANEOUS at 14:57

## 2019-04-28 RX ADMIN — ONDANSETRON 4 MILLIGRAM(S): 8 TABLET, FILM COATED ORAL at 10:40

## 2019-04-28 RX ADMIN — HEPARIN SODIUM 5000 UNIT(S): 5000 INJECTION INTRAVENOUS; SUBCUTANEOUS at 13:13

## 2019-04-28 RX ADMIN — Medication 30 MILLIGRAM(S): at 11:00

## 2019-04-28 RX ADMIN — Medication 400 MILLIGRAM(S): at 06:01

## 2019-04-28 RX ADMIN — POTASSIUM PHOSPHATE, MONOBASIC POTASSIUM PHOSPHATE, DIBASIC 62.5 MILLIMOLE(S): 236; 224 INJECTION, SOLUTION INTRAVENOUS at 19:39

## 2019-04-28 RX ADMIN — Medication 30 MILLIGRAM(S): at 10:28

## 2019-04-28 RX ADMIN — Medication 1000 MILLIGRAM(S): at 18:27

## 2019-04-28 RX ADMIN — HYDROMORPHONE HYDROCHLORIDE 0.5 MILLIGRAM(S): 2 INJECTION INTRAMUSCULAR; INTRAVENOUS; SUBCUTANEOUS at 15:15

## 2019-04-28 RX ADMIN — Medication 200 GRAM(S): at 19:09

## 2019-04-28 RX ADMIN — Medication 5 MILLIGRAM(S): at 17:36

## 2019-04-28 RX ADMIN — Medication 400 MILLIGRAM(S): at 23:52

## 2019-04-28 RX ADMIN — Medication 15 MILLIGRAM(S): at 04:43

## 2019-04-28 RX ADMIN — SODIUM CHLORIDE 125 MILLILITER(S): 9 INJECTION, SOLUTION INTRAVENOUS at 10:40

## 2019-04-28 RX ADMIN — Medication 400 MILLIGRAM(S): at 18:02

## 2019-04-28 RX ADMIN — Medication 5 MILLIGRAM(S): at 23:53

## 2019-04-28 RX ADMIN — Medication 15 MILLIGRAM(S): at 04:13

## 2019-04-28 RX ADMIN — HEPARIN SODIUM 5000 UNIT(S): 5000 INJECTION INTRAVENOUS; SUBCUTANEOUS at 06:01

## 2019-04-28 RX ADMIN — Medication 400 MILLIGRAM(S): at 12:27

## 2019-04-28 RX ADMIN — Medication 1: at 17:28

## 2019-04-28 RX ADMIN — Medication 1000 MILLIGRAM(S): at 06:31

## 2019-04-28 RX ADMIN — ONDANSETRON 4 MILLIGRAM(S): 8 TABLET, FILM COATED ORAL at 02:45

## 2019-04-28 RX ADMIN — Medication 1: at 23:52

## 2019-04-28 NOTE — CONSULT NOTE ADULT - ATTENDING COMMENTS
Pt seen and examined, chart and labs reviewed.  Care discussed with Dr. Gregorio and I agree with his preoperative assessment as above.    Pt is medically optimized for planned procedure with no further testing needed.

## 2019-04-28 NOTE — CONSULT NOTE ADULT - ASSESSMENT
I personally reviewed the patient's labs: CBC, BMP, coagulation profile  I personally reviewed the patient's EKG and my interpretation is:  I personally reviewed the patient's CXR and my interpretation is:    RCRI Score and Risk of Major Cardiac Event:  +1 for each of six risk factors:  [  ]High-risk Sx (intraperitoneal/thoracic or suprainguinal vasc)  [  ]History of ischemic heart disease       [  ]EKG without ischemic changes date:  [  ]History of congestive heart failure       Echo/EF:   [  ]History of Prior transient ischemic attack (TIA) or stroke  [  ]Pre-operative treatment with insulin       HbA1c: 6.8 % (04-25-19 @ 07:15)  [  ]Pre-operative creatinine >2 mg/dL / 176.8 µmol/L       Cr: 0.68 mg/dL (04-28-19 @ 07:05)    RCRI score and Risk of Major Cardiac Event:   [x] 0   =  3.9 %      Patient's RCRI score is: 0  Patient is considered XXX risk for XXX-risk procedure    Patient medically optimized for planned procedure. I personally reviewed the patient's labs: CBC, BMP, coagulation profile  I personally reviewed the patient's EKG and my interpretation is: Sinus Tachycardia w/o Q-waves discernible through artifact  I personally reviewed the patient's CXR and my interpretation is: Clear Lungs w/o Cardiomegaly, Vascular Congestion    RCRI Score and Risk of Major Cardiac Event:  [  ]High-risk Sx (intraperitoneal/thoracic or suprainguinal vasc)  [  ]History of ischemic heart disease       [x]EKG without ischemic changes date:  [  ]History of congestive heart failure       Echo/EF:   [  ]History of Prior transient ischemic attack (TIA) or stroke  [  ]Pre-operative treatment with longterm insulin       HbA1c: 6.8 % (04-25-19 @ 07:15)  [  ]Pre-operative creatinine >2 mg/dL / 176.8 µmol/L       Cr: 0.68 mg/dL (04-28-19 @ 07:05)    RCRI score and Risk of Major Cardiac Event:   [x] 0   =  3.9 %      Patient's RCRI score is: 0  Patient is considered low risk risk for intermediate-risk procedure    Patient medically optimized for planned procedure. 79yo F with PMH of T2DM (no insulin), HTN, and HLD a/w partial bowel obstruction due to stricture vs obstructing mass pending L Hemicolectomy tomorrow.    Patient's RCRI score is: 0  Patient is considered low risk risk for intermediate-risk procedure    Patient medically optimized for planned procedure.    TO BE DISCUSSED WITH ATTENDING 79yo F with PMH of T2DM (no insulin), HTN, and HLD a/w partial bowel obstruction due to stricture vs obstructing mass pending L Hemicolectomy tomorrow.    Patient's RCRI score is: 0  Patient is considered low risk risk for intermediate-risk procedure    Patient medically optimized for planned procedure.    Recommend continued repletion of electrolytes (Calcium, Phos) 79yo F with PMH of T2DM (no insulin), HTN, and HLD a/w partial bowel obstruction due to stricture vs obstructing mass pending L Hemicolectomy tomorrow.    Patient's RCRI score is: 0  No evidence of active chest pain, arrhythmia, or acute decompensated heart failure.    Patient is considered low risk risk for intermediate-risk procedure.  Patient medically optimized for planned procedure.    Recommend continued repletion of electrolytes (Calcium, Phos)

## 2019-04-28 NOTE — CONSULT NOTE ADULT - SUBJECTIVE AND OBJECTIVE BOX
IVAN WHALEN  78y Female    Patient is a 78y old  Female who presents with a chief complaint of Partial large bowel obstruction (28 Apr 2019 10:51)    INTERVAL HPI/OVERNIGHT EVENTS:        REVIEW OF SYSTEMS:      T(C): 37 (04-28-19 @ 09:32), Max: 37.1 (04-27-19 @ 17:18)  HR: 88 (04-28-19 @ 09:32) (72 - 88)  BP: 120/59 (04-28-19 @ 09:32) (120/59 - 142/84)  RR: 18 (04-28-19 @ 09:32) (17 - 18)  SpO2: 98% (04-28-19 @ 09:32) (96% - 98%)  Wt(kg): --Vital Signs Last 24 Hrs  T(C): 37 (28 Apr 2019 09:32), Max: 37.1 (27 Apr 2019 17:18)  T(F): 98.6 (28 Apr 2019 09:32), Max: 98.7 (27 Apr 2019 17:18)  HR: 88 (28 Apr 2019 09:32) (72 - 88)  BP: 120/59 (28 Apr 2019 09:32) (120/59 - 142/84)  BP(mean): --  RR: 18 (28 Apr 2019 09:32) (17 - 18)  SpO2: 98% (28 Apr 2019 09:32) (96% - 98%)    PHYSICAL EXAM:      Consultant(s) Notes Reviewed:  [x] YES  [ ] NO  Care Discussed with Consultants/Other Providers [x] YES  [ ] NO    LABS:                        12.8   3.08  )-----------( 317      ( 28 Apr 2019 07:05 )             39.2     143  |  106  |  14  ----------------------------<  127<H>  4.0   |  23  |  0.68    Ca    8.3<L>     Phos  2.0    Mg     1.6    CAPILLARY BLOOD GLUCOSE  POCT Blood Glucose.: 122 mg/dL (28 Apr 2019 07:02)  POCT Blood Glucose.: 147 mg/dL (27 Apr 2019 23:27)  POCT Blood Glucose.: 138 mg/dL (27 Apr 2019 17:15)          RADIOLOGY & ADDITIONAL TESTS:  Imaging Personally Reviewed:  [X] YES  [ ] NO IVAN WHALEN  78y Female    Patient is a 78y old  Female who presents with a chief complaint of Partial large bowel obstruction (28 Apr 2019 10:51)    Procedure: Left Hemicolectomy  Procedure Date: 4/29/19  Primary Team: Surgery B Team    HPI/INTERVAL EVENTS: 77yo F with PMH of T2DM (without longterm insulin use), HTN, and HLD originally presenting on 4/24/19 with abdominal pain and was found to have a partial large bowel obstruction due to an obstructing mass vs stricture. She had an NGT placed for decompression. She underwent an inpatient colonscopy which revealed a colonic stricture which could not be thoroughly traversing leaving the possibility of mass/malignancy. Patient is now scheduled for a L hemicolectomy for further evaluation.     Patient reports improvement in her abdominal pain. She states that prior to admission she had a large episode of fecal vomiting, change in her stool for a few months, and has been with low appetite with minimal po intake over the past 6 months with a 20lb weight loss. Patient reports no melena/hematochezia, no hematemesis, no diarrhea, no dysphagia/odynophagia, no fever/chills.    Pre-Op Assessment - Patient denies any history of cardiac disease including CAD or CHF. She reports being able to walk up the 20 steps to her apartment daily without CP or SOB. She endorses being able to walk 2 blocks to her supermarket and through the store as long as she has her cane due to chronic back pain = at least 4 METs. She tolerated anaesthesia well for her recent colonoscopy. She denies any other operative procedures.    Cardiac Imaging: Reports her previous EKGs have been normal as far as she is aware. Has never had a Stress Test or Echo.      REVIEW OF SYSTEMS:  CONSTITUTIONAL: +weight loss. No weakness, fevers or chills  EYES/ENT: No visual changes;  No vertigo or throat pain   NECK: No pain or stiffness  RESPIRATORY: No cough, wheezing, hemoptysis; No shortness of breath  CARDIOVASCULAR: No chest pain or palpitations  GASTROINTESTINAL: +abdominal pain, nausea, vomiting. No hematemesis; No diarrhea. No melena or hematochezia.  GENITOURINARY: No dysuria, frequency or hematuria  NEUROLOGICAL: +back pain. No numbness or weakness  SKIN: No itching, burning, rashes, or lesions  PSYCH: No anxiety or depression  All other review of systems is negative unless indicated above.      T(C): 37 (04-28-19 @ 09:32), Max: 37.1 (04-27-19 @ 17:18)  HR: 88 (04-28-19 @ 09:32) (72 - 88)  BP: 120/59 (04-28-19 @ 09:32) (120/59 - 142/84)  RR: 18 (04-28-19 @ 09:32) (17 - 18)  SpO2: 98% (04-28-19 @ 09:32) (96% - 98%)  Wt(kg): --Vital Signs Last 24 Hrs  T(C): 37 (28 Apr 2019 09:32), Max: 37.1 (27 Apr 2019 17:18)  T(F): 98.6 (28 Apr 2019 09:32), Max: 98.7 (27 Apr 2019 17:18)  HR: 88 (28 Apr 2019 09:32) (72 - 88)  BP: 120/59 (28 Apr 2019 09:32) (120/59 - 142/84)  BP(mean): --  RR: 18 (28 Apr 2019 09:32) (17 - 18)  SpO2: 98% (28 Apr 2019 09:32) (96% - 98%)    PHYSICAL EXAM:  GENERAL: No acute distress, well-developed  HEAD:  Atraumatic, Normocephalic  ENT: +NGT. EOMI, PERRLA, conjunctiva and sclera clear, Neck supple, No JVD, moist mucosa  CHEST/LUNG: Clear to auscultation bilaterally; No wheeze, equal breath sounds bilaterally   BACK: No spinal tenderness  HEART: Regular rate and rhythm; No murmurs, rubs, or gallops  ABDOMEN: Distended, epigastric TTP, +BS  EXTREMITIES: trace bilateral pitting edema. No clubbing, cyanosis.  PSYCH: Nl behavior, nl affect  NEUROLOGY: AAOx3, non-focal, cranial nerves intact  SKIN: Normal color, No rashes or lesions      Consultant(s) Notes Reviewed:  [x] YES  [ ] NO  Care Discussed with Consultants/Other Providers [x] YES  [ ] NO    LABS:                        12.8   3.08  )-----------( 317      ( 28 Apr 2019 07:05 )             39.2     143  |  106  |  14  ----------------------------<  127<H>  4.0   |  23  |  0.68    Ca    8.3<L>     Phos  2.0    Mg     1.6    CAPILLARY BLOOD GLUCOSE  POCT Blood Glucose.: 122 mg/dL (28 Apr 2019 07:02)  POCT Blood Glucose.: 147 mg/dL (27 Apr 2019 23:27)  POCT Blood Glucose.: 138 mg/dL (27 Apr 2019 17:15)    RADIOLOGY & ADDITIONAL TESTS:  Imaging Personally Reviewed:  [X] YES  [ ] NO IVAN WHALEN  78y Female    Patient is a 78y old  Female who presents with a chief complaint of Partial large bowel obstruction (28 Apr 2019 10:51)    Procedure: Left Hemicolectomy  Procedure Date: 4/29/19  Primary Team: Surgery B Team    HPI/INTERVAL EVENTS: 77yo F with PMH of T2DM (without longterm insulin use), HTN, and HLD originally presenting on 4/24/19 with abdominal pain and was found to have a partial large bowel obstruction due to an obstructing mass vs stricture. She had an NGT placed for decompression. She underwent an inpatient colonscopy which revealed a colonic stricture which could not be thoroughly traversing leaving the possibility of mass/malignancy. Patient is now scheduled for a L hemicolectomy for further evaluation.     Patient reports improvement in her abdominal pain. She states that prior to admission she had a large episode of fecal vomiting, change in her stool for a few months, and has been with low appetite with minimal po intake over the past 6 months with a 20lb weight loss. Patient reports no melena/hematochezia, no hematemesis, no diarrhea, no dysphagia/odynophagia, no fever/chills.    Pre-Op Assessment - Patient denies any history of cardiac disease including CAD or CHF. She reports being able to walk up the 20 steps to her apartment daily without CP or SOB. She endorses being able to walk 2 blocks to her supermarket and through the store as long as she has her cane due to chronic back pain = at least 4 METs. She tolerated anaesthesia well for her recent colonoscopy. She denies any other operative procedures.    Cardiac Imaging: Reports her previous EKGs have been normal as far as she is aware. Has never had a Stress Test or Echo.      REVIEW OF SYSTEMS:  CONSTITUTIONAL: +weight loss. No weakness, fevers or chills  EYES/ENT: No visual changes;  No vertigo or throat pain   NECK: No pain or stiffness  RESPIRATORY: No cough, wheezing, hemoptysis; No shortness of breath  CARDIOVASCULAR: No chest pain or palpitations  GASTROINTESTINAL: +abdominal pain, nausea, vomiting. No hematemesis; No diarrhea. No melena or hematochezia.  GENITOURINARY: No dysuria, frequency or hematuria  NEUROLOGICAL: +back pain. No numbness or weakness  SKIN: No itching, burning, rashes, or lesions  PSYCH: No anxiety or depression  All other review of systems is negative unless indicated above.      T(C): 37 (04-28-19 @ 09:32), Max: 37.1 (04-27-19 @ 17:18)  HR: 88 (04-28-19 @ 09:32) (72 - 88)  BP: 120/59 (04-28-19 @ 09:32) (120/59 - 142/84)  RR: 18 (04-28-19 @ 09:32) (17 - 18)  SpO2: 98% (04-28-19 @ 09:32) (96% - 98%)  Wt(kg): --Vital Signs Last 24 Hrs  T(C): 37 (28 Apr 2019 09:32), Max: 37.1 (27 Apr 2019 17:18)  T(F): 98.6 (28 Apr 2019 09:32), Max: 98.7 (27 Apr 2019 17:18)  HR: 88 (28 Apr 2019 09:32) (72 - 88)  BP: 120/59 (28 Apr 2019 09:32) (120/59 - 142/84)  BP(mean): --  RR: 18 (28 Apr 2019 09:32) (17 - 18)  SpO2: 98% (28 Apr 2019 09:32) (96% - 98%)    PHYSICAL EXAM:  GENERAL: No acute distress, well-developed  HEAD:  Atraumatic, Normocephalic  ENT: +NGT. EOMI, PERRLA, conjunctiva and sclera clear, Neck supple, No JVD, moist mucosa  CHEST/LUNG: Clear to auscultation bilaterally; No wheeze, equal breath sounds bilaterally   BACK: No spinal tenderness  HEART: Regular rate and rhythm; No murmurs, rubs, or gallops  ABDOMEN: Distended, epigastric TTP, +BS  EXTREMITIES: trace bilateral pitting edema. No clubbing, cyanosis.  PSYCH: Nl behavior, nl affect  NEUROLOGY: AAOx3, non-focal, cranial nerves intact  SKIN: Normal color, No rashes or lesions      Consultant(s) Notes Reviewed:  [x] YES  [ ] NO  Care Discussed with Consultants/Other Providers [x] YES  [ ] NO    LABS:                        12.8   3.08  )-----------( 317      ( 28 Apr 2019 07:05 )             39.2     143  |  106  |  14  ----------------------------<  127<H>  4.0   |  23  |  0.68    Ca    8.3<L>     Phos  2.0    Mg     1.6    CAPILLARY BLOOD GLUCOSE  POCT Blood Glucose.: 122 mg/dL (28 Apr 2019 07:02)  POCT Blood Glucose.: 147 mg/dL (27 Apr 2019 23:27)  POCT Blood Glucose.: 138 mg/dL (27 Apr 2019 17:15)    RADIOLOGY & ADDITIONAL TESTS:  Imaging Personally Reviewed:  [X] YES  [ ] NO    I personally reviewed the patient's labs: CBC, BMP, coagulation profile  I personally reviewed the patient's EKG and my interpretation is: Sinus Tachycardia w/o Q-waves discernible through artifact  I personally reviewed the patient's CXR and my interpretation is: Clear Lungs w/o Cardiomegaly, Vascular Congestion    RCRI Score and Risk of Major Cardiac Event:  [  ]High-risk Sx (intraperitoneal/thoracic or suprainguinal vasc)  [  ]History of ischemic heart disease       [x]EKG without ischemic changes date:  [  ]History of congestive heart failure       Echo/EF:   [  ]History of Prior transient ischemic attack (TIA) or stroke  [  ]Pre-operative treatment with longterm insulin       HbA1c: 6.8 % (04-25-19 @ 07:15)  [  ]Pre-operative creatinine >2 mg/dL / 176.8 µmol/L       Cr: 0.68 mg/dL (04-28-19 @ 07:05)    RCRI score and Risk of Major Cardiac Event:   [x] 0   =  3.9 %

## 2019-04-29 ENCOUNTER — RESULT REVIEW (OUTPATIENT)
Age: 79
End: 2019-04-29

## 2019-04-29 LAB
ANION GAP SERPL CALC-SCNC: 16 MMO/L — HIGH (ref 7–14)
APTT BLD: 30.3 SEC — SIGNIFICANT CHANGE UP (ref 27.5–36.3)
BACTERIA BLD CULT: SIGNIFICANT CHANGE UP
BACTERIA BLD CULT: SIGNIFICANT CHANGE UP
BASE EXCESS BLDA CALC-SCNC: -1.8 MMOL/L — SIGNIFICANT CHANGE UP
BASE EXCESS BLDA CALC-SCNC: -2.4 MMOL/L — SIGNIFICANT CHANGE UP
BUN SERPL-MCNC: 18 MG/DL — SIGNIFICANT CHANGE UP (ref 7–23)
CA-I BLDA-SCNC: 1.15 MMOL/L — SIGNIFICANT CHANGE UP (ref 1.15–1.29)
CA-I BLDA-SCNC: 1.15 MMOL/L — SIGNIFICANT CHANGE UP (ref 1.15–1.29)
CALCIUM SERPL-MCNC: 8.5 MG/DL — SIGNIFICANT CHANGE UP (ref 8.4–10.5)
CHLORIDE SERPL-SCNC: 106 MMOL/L — SIGNIFICANT CHANGE UP (ref 98–107)
CO2 SERPL-SCNC: 22 MMOL/L — SIGNIFICANT CHANGE UP (ref 22–31)
CREAT SERPL-MCNC: 0.63 MG/DL — SIGNIFICANT CHANGE UP (ref 0.5–1.3)
GLUCOSE BLDA-MCNC: 115 MG/DL — HIGH (ref 70–99)
GLUCOSE BLDA-MCNC: 136 MG/DL — HIGH (ref 70–99)
GLUCOSE BLDC GLUCOMTR-MCNC: 127 MG/DL — HIGH (ref 70–99)
GLUCOSE BLDC GLUCOMTR-MCNC: 128 MG/DL — HIGH (ref 70–99)
GLUCOSE BLDC GLUCOMTR-MCNC: 128 MG/DL — HIGH (ref 70–99)
GLUCOSE SERPL-MCNC: 136 MG/DL — HIGH (ref 70–99)
HCO3 BLDA-SCNC: 23 MMOL/L — SIGNIFICANT CHANGE UP (ref 22–26)
HCO3 BLDA-SCNC: 23 MMOL/L — SIGNIFICANT CHANGE UP (ref 22–26)
HCT VFR BLD CALC: 40.9 % — SIGNIFICANT CHANGE UP (ref 34.5–45)
HCT VFR BLDA CALC: 33.6 % — LOW (ref 34.5–46.5)
HCT VFR BLDA CALC: 35.9 % — SIGNIFICANT CHANGE UP (ref 34.5–46.5)
HGB BLD-MCNC: 13 G/DL — SIGNIFICANT CHANGE UP (ref 11.5–15.5)
HGB BLDA-MCNC: 10.9 G/DL — LOW (ref 11.5–15.5)
HGB BLDA-MCNC: 11.7 G/DL — SIGNIFICANT CHANGE UP (ref 11.5–15.5)
INR BLD: 1.15 — SIGNIFICANT CHANGE UP (ref 0.88–1.17)
MAGNESIUM SERPL-MCNC: 1.5 MG/DL — LOW (ref 1.6–2.6)
MCHC RBC-ENTMCNC: 31.2 PG — SIGNIFICANT CHANGE UP (ref 27–34)
MCHC RBC-ENTMCNC: 31.8 % — LOW (ref 32–36)
MCV RBC AUTO: 98.1 FL — SIGNIFICANT CHANGE UP (ref 80–100)
NRBC # FLD: 0 K/UL — SIGNIFICANT CHANGE UP (ref 0–0)
PCO2 BLDA: 36 MMHG — SIGNIFICANT CHANGE UP (ref 32–48)
PCO2 BLDA: 38 MMHG — SIGNIFICANT CHANGE UP (ref 32–48)
PH BLDA: 7.38 PH — SIGNIFICANT CHANGE UP (ref 7.35–7.45)
PH BLDA: 7.41 PH — SIGNIFICANT CHANGE UP (ref 7.35–7.45)
PHOSPHATE SERPL-MCNC: 2.4 MG/DL — LOW (ref 2.5–4.5)
PLATELET # BLD AUTO: 284 K/UL — SIGNIFICANT CHANGE UP (ref 150–400)
PMV BLD: 11.3 FL — SIGNIFICANT CHANGE UP (ref 7–13)
PO2 BLDA: 341 MMHG — HIGH (ref 83–108)
PO2 BLDA: 456 MMHG — HIGH (ref 83–108)
POTASSIUM BLDA-SCNC: 3.8 MMOL/L — SIGNIFICANT CHANGE UP (ref 3.4–4.5)
POTASSIUM BLDA-SCNC: 4.6 MMOL/L — HIGH (ref 3.4–4.5)
POTASSIUM SERPL-MCNC: 4.5 MMOL/L — SIGNIFICANT CHANGE UP (ref 3.5–5.3)
POTASSIUM SERPL-SCNC: 4.5 MMOL/L — SIGNIFICANT CHANGE UP (ref 3.5–5.3)
PROTHROM AB SERPL-ACNC: 13.2 SEC — HIGH (ref 9.8–13.1)
RBC # BLD: 4.17 M/UL — SIGNIFICANT CHANGE UP (ref 3.8–5.2)
RBC # FLD: 13.8 % — SIGNIFICANT CHANGE UP (ref 10.3–14.5)
SAO2 % BLDA: 99.6 % — HIGH (ref 95–99)
SAO2 % BLDA: 99.7 % — HIGH (ref 95–99)
SODIUM BLDA-SCNC: 137 MMOL/L — SIGNIFICANT CHANGE UP (ref 136–146)
SODIUM BLDA-SCNC: 138 MMOL/L — SIGNIFICANT CHANGE UP (ref 136–146)
SODIUM SERPL-SCNC: 144 MMOL/L — SIGNIFICANT CHANGE UP (ref 135–145)
WBC # BLD: 5.55 K/UL — SIGNIFICANT CHANGE UP (ref 3.8–10.5)
WBC # FLD AUTO: 5.55 K/UL — SIGNIFICANT CHANGE UP (ref 3.8–10.5)

## 2019-04-29 PROCEDURE — 88331 PATH CONSLTJ SURG 1 BLK 1SPC: CPT | Mod: 26

## 2019-04-29 PROCEDURE — 88342 IMHCHEM/IMCYTCHM 1ST ANTB: CPT | Mod: 26

## 2019-04-29 PROCEDURE — 88341 IMHCHEM/IMCYTCHM EA ADD ANTB: CPT | Mod: 26

## 2019-04-29 PROCEDURE — 88309 TISSUE EXAM BY PATHOLOGIST: CPT | Mod: 26

## 2019-04-29 RX ORDER — ONDANSETRON 8 MG/1
4 TABLET, FILM COATED ORAL ONCE
Qty: 0 | Refills: 0 | Status: COMPLETED | OUTPATIENT
Start: 2019-04-29 | End: 2019-04-29

## 2019-04-29 RX ORDER — NALOXONE HYDROCHLORIDE 4 MG/.1ML
0.1 SPRAY NASAL
Qty: 0 | Refills: 0 | Status: DISCONTINUED | OUTPATIENT
Start: 2019-04-29 | End: 2019-05-03

## 2019-04-29 RX ORDER — ONDANSETRON 8 MG/1
4 TABLET, FILM COATED ORAL EVERY 6 HOURS
Qty: 0 | Refills: 0 | Status: DISCONTINUED | OUTPATIENT
Start: 2019-04-29 | End: 2019-05-03

## 2019-04-29 RX ORDER — HYDROMORPHONE HYDROCHLORIDE 2 MG/ML
30 INJECTION INTRAMUSCULAR; INTRAVENOUS; SUBCUTANEOUS
Qty: 0 | Refills: 0 | Status: DISCONTINUED | OUTPATIENT
Start: 2019-04-29 | End: 2019-05-03

## 2019-04-29 RX ORDER — ACETAMINOPHEN 500 MG
1000 TABLET ORAL ONCE
Qty: 0 | Refills: 0 | Status: COMPLETED | OUTPATIENT
Start: 2019-04-30 | End: 2019-04-30

## 2019-04-29 RX ORDER — ACETAMINOPHEN 500 MG
1000 TABLET ORAL ONCE
Qty: 0 | Refills: 0 | Status: COMPLETED | OUTPATIENT
Start: 2019-04-29 | End: 2019-04-29

## 2019-04-29 RX ORDER — ACETAMINOPHEN 500 MG
1000 TABLET ORAL ONCE
Qty: 0 | Refills: 0 | Status: COMPLETED | OUTPATIENT
Start: 2019-05-01 | End: 2019-05-01

## 2019-04-29 RX ORDER — SODIUM CHLORIDE 9 MG/ML
1000 INJECTION, SOLUTION INTRAVENOUS
Qty: 0 | Refills: 0 | Status: DISCONTINUED | OUTPATIENT
Start: 2019-04-29 | End: 2019-04-30

## 2019-04-29 RX ORDER — HYDROMORPHONE HYDROCHLORIDE 2 MG/ML
0.5 INJECTION INTRAMUSCULAR; INTRAVENOUS; SUBCUTANEOUS
Qty: 0 | Refills: 0 | Status: DISCONTINUED | OUTPATIENT
Start: 2019-04-29 | End: 2019-05-03

## 2019-04-29 RX ADMIN — Medication 1000 MILLIGRAM(S): at 10:00

## 2019-04-29 RX ADMIN — Medication 400 MILLIGRAM(S): at 09:28

## 2019-04-29 RX ADMIN — HEPARIN SODIUM 5000 UNIT(S): 5000 INJECTION INTRAVENOUS; SUBCUTANEOUS at 06:23

## 2019-04-29 RX ADMIN — ONDANSETRON 4 MILLIGRAM(S): 8 TABLET, FILM COATED ORAL at 06:57

## 2019-04-29 RX ADMIN — HYDROMORPHONE HYDROCHLORIDE 30 MILLILITER(S): 2 INJECTION INTRAMUSCULAR; INTRAVENOUS; SUBCUTANEOUS at 21:45

## 2019-04-29 RX ADMIN — SODIUM CHLORIDE 125 MILLILITER(S): 9 INJECTION, SOLUTION INTRAVENOUS at 11:30

## 2019-04-29 RX ADMIN — SODIUM CHLORIDE 125 MILLILITER(S): 9 INJECTION, SOLUTION INTRAVENOUS at 22:00

## 2019-04-29 RX ADMIN — Medication 1000 MILLIGRAM(S): at 00:22

## 2019-04-29 RX ADMIN — Medication 5 MILLIGRAM(S): at 06:23

## 2019-04-29 RX ADMIN — Medication 5 MILLIGRAM(S): at 11:23

## 2019-04-29 NOTE — BRIEF OPERATIVE NOTE - COMMENTS
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

## 2019-04-29 NOTE — BRIEF OPERATIVE NOTE - OPERATION/FINDINGS
Exploratory laparotomy performed via a lower midline incision. A moderate amount of serous ascites was encountered and suctioned. Diverticulosis of the descending and sigmoid colon observed. The sigmoid colon was contracted and dense at its mid-portion, and this segment was densely adherent to the side wall of the LLQ and pelvis. Cumulatively, the pathology suggested stricture secondary to diverticular disease. The dense adhesions of the sigmoid were taken down with a combination of sharp dissection and electrocautery. The distal sigmoid and rectum appeared and felt normal, and the proximal sigmoid and descending colon likewise felt soft (although diverticulosis was present). After mobilizing the sigmoid as much as possible - given thickened and foreshortened mesentery - the proximal sigmoid and rectum were divided with endo-MARCELINA purple staple loads, and the intervening mesentery was divided with [place] Ligasure nearby to the lumen of the colon. Multiple sites of venous, and one site of arterial, bleeding were ligated with Vicryl sutures. Following resection of the specimen, an end sigmoid colostomy was matured in the LLQ. The fascia was closed with No. 1 Prolene interrupted figure-of-eight sutures. The umbilicus was approximated with staples, and the remainder of the wound was left open, packed loosely with 1/4" plain packing.

## 2019-04-30 LAB
ANION GAP SERPL CALC-SCNC: 11 MMO/L — SIGNIFICANT CHANGE UP (ref 7–14)
BUN SERPL-MCNC: 12 MG/DL — SIGNIFICANT CHANGE UP (ref 7–23)
CALCIUM SERPL-MCNC: 7.6 MG/DL — LOW (ref 8.4–10.5)
CHLORIDE SERPL-SCNC: 105 MMOL/L — SIGNIFICANT CHANGE UP (ref 98–107)
CO2 SERPL-SCNC: 24 MMOL/L — SIGNIFICANT CHANGE UP (ref 22–31)
CREAT SERPL-MCNC: 0.6 MG/DL — SIGNIFICANT CHANGE UP (ref 0.5–1.3)
GLUCOSE BLDC GLUCOMTR-MCNC: 118 MG/DL — HIGH (ref 70–99)
GLUCOSE BLDC GLUCOMTR-MCNC: 120 MG/DL — HIGH (ref 70–99)
GLUCOSE BLDC GLUCOMTR-MCNC: 123 MG/DL — HIGH (ref 70–99)
GLUCOSE BLDC GLUCOMTR-MCNC: 148 MG/DL — HIGH (ref 70–99)
GLUCOSE SERPL-MCNC: 145 MG/DL — HIGH (ref 70–99)
HCT VFR BLD CALC: 32.6 % — LOW (ref 34.5–45)
HGB BLD-MCNC: 10.5 G/DL — LOW (ref 11.5–15.5)
MAGNESIUM SERPL-MCNC: 1.2 MG/DL — LOW (ref 1.6–2.6)
MCHC RBC-ENTMCNC: 31 PG — SIGNIFICANT CHANGE UP (ref 27–34)
MCHC RBC-ENTMCNC: 32.2 % — SIGNIFICANT CHANGE UP (ref 32–36)
MCV RBC AUTO: 96.2 FL — SIGNIFICANT CHANGE UP (ref 80–100)
NRBC # FLD: 0 K/UL — SIGNIFICANT CHANGE UP (ref 0–0)
PHOSPHATE SERPL-MCNC: 2.6 MG/DL — SIGNIFICANT CHANGE UP (ref 2.5–4.5)
PLATELET # BLD AUTO: 252 K/UL — SIGNIFICANT CHANGE UP (ref 150–400)
PMV BLD: 10.5 FL — SIGNIFICANT CHANGE UP (ref 7–13)
POTASSIUM SERPL-MCNC: 4.2 MMOL/L — SIGNIFICANT CHANGE UP (ref 3.5–5.3)
POTASSIUM SERPL-SCNC: 4.2 MMOL/L — SIGNIFICANT CHANGE UP (ref 3.5–5.3)
RBC # BLD: 3.39 M/UL — LOW (ref 3.8–5.2)
RBC # FLD: 14 % — SIGNIFICANT CHANGE UP (ref 10.3–14.5)
SODIUM SERPL-SCNC: 140 MMOL/L — SIGNIFICANT CHANGE UP (ref 135–145)
SPECIMEN SOURCE: SIGNIFICANT CHANGE UP
SPECIMEN SOURCE: SIGNIFICANT CHANGE UP
WBC # BLD: 5.5 K/UL — SIGNIFICANT CHANGE UP (ref 3.8–10.5)
WBC # FLD AUTO: 5.5 K/UL — SIGNIFICANT CHANGE UP (ref 3.8–10.5)

## 2019-04-30 RX ORDER — ACETAMINOPHEN 500 MG
1000 TABLET ORAL ONCE
Qty: 0 | Refills: 0 | Status: COMPLETED | OUTPATIENT
Start: 2019-05-01 | End: 2019-05-01

## 2019-04-30 RX ORDER — SODIUM CHLORIDE 9 MG/ML
500 INJECTION, SOLUTION INTRAVENOUS ONCE
Qty: 0 | Refills: 0 | Status: COMPLETED | OUTPATIENT
Start: 2019-04-30 | End: 2019-04-30

## 2019-04-30 RX ORDER — ENOXAPARIN SODIUM 100 MG/ML
40 INJECTION SUBCUTANEOUS DAILY
Qty: 0 | Refills: 0 | Status: DISCONTINUED | OUTPATIENT
Start: 2019-04-30 | End: 2019-05-06

## 2019-04-30 RX ORDER — SODIUM CHLORIDE 9 MG/ML
1000 INJECTION, SOLUTION INTRAVENOUS
Qty: 0 | Refills: 0 | Status: DISCONTINUED | OUTPATIENT
Start: 2019-04-30 | End: 2019-05-02

## 2019-04-30 RX ORDER — KETOROLAC TROMETHAMINE 30 MG/ML
15 SYRINGE (ML) INJECTION EVERY 6 HOURS
Qty: 0 | Refills: 0 | Status: DISCONTINUED | OUTPATIENT
Start: 2019-04-30 | End: 2019-05-01

## 2019-04-30 RX ORDER — MAGNESIUM SULFATE 500 MG/ML
2 VIAL (ML) INJECTION ONCE
Qty: 0 | Refills: 0 | Status: COMPLETED | OUTPATIENT
Start: 2019-04-30 | End: 2019-04-30

## 2019-04-30 RX ADMIN — HEPARIN SODIUM 5000 UNIT(S): 5000 INJECTION INTRAVENOUS; SUBCUTANEOUS at 06:05

## 2019-04-30 RX ADMIN — Medication 400 MILLIGRAM(S): at 06:05

## 2019-04-30 RX ADMIN — Medication 400 MILLIGRAM(S): at 18:58

## 2019-04-30 RX ADMIN — SODIUM CHLORIDE 1000 MILLILITER(S): 9 INJECTION, SOLUTION INTRAVENOUS at 01:15

## 2019-04-30 RX ADMIN — Medication 50 GRAM(S): at 07:25

## 2019-04-30 RX ADMIN — ENOXAPARIN SODIUM 40 MILLIGRAM(S): 100 INJECTION SUBCUTANEOUS at 12:56

## 2019-04-30 RX ADMIN — Medication 5 MILLIGRAM(S): at 06:11

## 2019-04-30 RX ADMIN — Medication 1000 MILLIGRAM(S): at 00:39

## 2019-04-30 RX ADMIN — HYDROMORPHONE HYDROCHLORIDE 30 MILLILITER(S): 2 INJECTION INTRAMUSCULAR; INTRAVENOUS; SUBCUTANEOUS at 21:01

## 2019-04-30 RX ADMIN — Medication 15 MILLIGRAM(S): at 18:53

## 2019-04-30 RX ADMIN — SODIUM CHLORIDE 125 MILLILITER(S): 9 INJECTION, SOLUTION INTRAVENOUS at 21:02

## 2019-04-30 RX ADMIN — Medication 400 MILLIGRAM(S): at 00:13

## 2019-04-30 RX ADMIN — HYDROMORPHONE HYDROCHLORIDE 30 MILLILITER(S): 2 INJECTION INTRAMUSCULAR; INTRAVENOUS; SUBCUTANEOUS at 08:43

## 2019-04-30 RX ADMIN — Medication 15 MILLIGRAM(S): at 12:56

## 2019-04-30 RX ADMIN — HEPARIN SODIUM 5000 UNIT(S): 5000 INJECTION INTRAVENOUS; SUBCUTANEOUS at 00:12

## 2019-05-01 LAB
ANION GAP SERPL CALC-SCNC: 13 MMO/L — SIGNIFICANT CHANGE UP (ref 7–14)
BUN SERPL-MCNC: 16 MG/DL — SIGNIFICANT CHANGE UP (ref 7–23)
CALCIUM SERPL-MCNC: 7.7 MG/DL — LOW (ref 8.4–10.5)
CHLORIDE SERPL-SCNC: 105 MMOL/L — SIGNIFICANT CHANGE UP (ref 98–107)
CO2 SERPL-SCNC: 22 MMOL/L — SIGNIFICANT CHANGE UP (ref 22–31)
CREAT SERPL-MCNC: 0.77 MG/DL — SIGNIFICANT CHANGE UP (ref 0.5–1.3)
GLUCOSE BLDC GLUCOMTR-MCNC: 141 MG/DL — HIGH (ref 70–99)
GLUCOSE BLDC GLUCOMTR-MCNC: 143 MG/DL — HIGH (ref 70–99)
GLUCOSE BLDC GLUCOMTR-MCNC: 169 MG/DL — HIGH (ref 70–99)
GLUCOSE BLDC GLUCOMTR-MCNC: 188 MG/DL — HIGH (ref 70–99)
GLUCOSE SERPL-MCNC: 179 MG/DL — HIGH (ref 70–99)
HCT VFR BLD CALC: 32.6 % — LOW (ref 34.5–45)
HGB BLD-MCNC: 10.2 G/DL — LOW (ref 11.5–15.5)
MAGNESIUM SERPL-MCNC: 1.8 MG/DL — SIGNIFICANT CHANGE UP (ref 1.6–2.6)
MCHC RBC-ENTMCNC: 30.8 PG — SIGNIFICANT CHANGE UP (ref 27–34)
MCHC RBC-ENTMCNC: 31.3 % — LOW (ref 32–36)
MCV RBC AUTO: 98.5 FL — SIGNIFICANT CHANGE UP (ref 80–100)
NRBC # FLD: 0 K/UL — SIGNIFICANT CHANGE UP (ref 0–0)
PHOSPHATE SERPL-MCNC: 2.3 MG/DL — LOW (ref 2.5–4.5)
PLATELET # BLD AUTO: 218 K/UL — SIGNIFICANT CHANGE UP (ref 150–400)
PMV BLD: 10.7 FL — SIGNIFICANT CHANGE UP (ref 7–13)
POTASSIUM SERPL-MCNC: 3.6 MMOL/L — SIGNIFICANT CHANGE UP (ref 3.5–5.3)
POTASSIUM SERPL-SCNC: 3.6 MMOL/L — SIGNIFICANT CHANGE UP (ref 3.5–5.3)
RBC # BLD: 3.31 M/UL — LOW (ref 3.8–5.2)
RBC # FLD: 14.6 % — HIGH (ref 10.3–14.5)
SODIUM SERPL-SCNC: 140 MMOL/L — SIGNIFICANT CHANGE UP (ref 135–145)
WBC # BLD: 13.26 K/UL — HIGH (ref 3.8–10.5)
WBC # FLD AUTO: 13.26 K/UL — HIGH (ref 3.8–10.5)

## 2019-05-01 RX ORDER — ACETAMINOPHEN 500 MG
1000 TABLET ORAL ONCE
Qty: 0 | Refills: 0 | Status: COMPLETED | OUTPATIENT
Start: 2019-05-01 | End: 2019-05-01

## 2019-05-01 RX ORDER — KETOROLAC TROMETHAMINE 30 MG/ML
15 SYRINGE (ML) INJECTION ONCE
Qty: 0 | Refills: 0 | Status: DISCONTINUED | OUTPATIENT
Start: 2019-05-01 | End: 2019-05-01

## 2019-05-01 RX ORDER — POTASSIUM CHLORIDE 20 MEQ
10 PACKET (EA) ORAL
Qty: 0 | Refills: 0 | Status: COMPLETED | OUTPATIENT
Start: 2019-05-01 | End: 2019-05-01

## 2019-05-01 RX ADMIN — Medication 400 MILLIGRAM(S): at 06:41

## 2019-05-01 RX ADMIN — Medication 15 MILLIGRAM(S): at 23:58

## 2019-05-01 RX ADMIN — Medication 1000 MILLIGRAM(S): at 07:11

## 2019-05-01 RX ADMIN — Medication 15 MILLIGRAM(S): at 00:14

## 2019-05-01 RX ADMIN — Medication 100 MILLIEQUIVALENT(S): at 10:59

## 2019-05-01 RX ADMIN — Medication 100 MILLIEQUIVALENT(S): at 12:42

## 2019-05-01 RX ADMIN — Medication 400 MILLIGRAM(S): at 18:21

## 2019-05-01 RX ADMIN — Medication 100 MILLIEQUIVALENT(S): at 09:59

## 2019-05-01 RX ADMIN — HYDROMORPHONE HYDROCHLORIDE 30 MILLILITER(S): 2 INJECTION INTRAMUSCULAR; INTRAVENOUS; SUBCUTANEOUS at 20:12

## 2019-05-01 RX ADMIN — Medication 15 MILLIGRAM(S): at 00:29

## 2019-05-01 RX ADMIN — Medication 1: at 07:16

## 2019-05-01 RX ADMIN — Medication 400 MILLIGRAM(S): at 12:43

## 2019-05-01 RX ADMIN — Medication 15 MILLIGRAM(S): at 06:41

## 2019-05-01 RX ADMIN — Medication 1: at 18:21

## 2019-05-01 RX ADMIN — Medication 5 MILLIGRAM(S): at 18:54

## 2019-05-01 RX ADMIN — Medication 400 MILLIGRAM(S): at 23:58

## 2019-05-01 RX ADMIN — Medication 1000 MILLIGRAM(S): at 00:44

## 2019-05-01 RX ADMIN — HYDROMORPHONE HYDROCHLORIDE 30 MILLILITER(S): 2 INJECTION INTRAMUSCULAR; INTRAVENOUS; SUBCUTANEOUS at 08:18

## 2019-05-01 RX ADMIN — Medication 15 MILLIGRAM(S): at 06:56

## 2019-05-01 RX ADMIN — Medication 400 MILLIGRAM(S): at 00:14

## 2019-05-01 RX ADMIN — ENOXAPARIN SODIUM 40 MILLIGRAM(S): 100 INJECTION SUBCUTANEOUS at 12:43

## 2019-05-01 NOTE — DIETITIAN INITIAL EVALUATION ADULT. - NS AS NUTRI INTERV MEALS SNACK3
Recommend Clear liquids, add Consistent Carbohydrate diet restriction and advance as tolerated Full liquids, Consistent Carbohydrate --> Low fiber, Consistent Carbohydrate diet.

## 2019-05-01 NOTE — PHYSICAL THERAPY INITIAL EVALUATION ADULT - PHYSICAL ASSIST/NONPHYSICAL ASSIST: GAIT, REHAB EVAL
nonverbal cues (demo/gestures)/supervision/1 person assist/verbal cues
verbal cues/nonverbal cues (demo/gestures)/1 person assist/supervision

## 2019-05-01 NOTE — PHYSICAL THERAPY INITIAL EVALUATION ADULT - TRANSFER SAFETY CONCERNS NOTED: SIT/STAND, REHAB EVAL
decreased balance during turns/decreased step length/decreased weight-shifting ability
decreased balance during turns/decreased weight-shifting ability/decreased step length

## 2019-05-01 NOTE — PHYSICAL THERAPY INITIAL EVALUATION ADULT - IMPAIRMENTS CONTRIBUTING TO GAIT DEVIATIONS, PT EVAL
impaired balance/impaired postural control/decreased strength
decreased strength/impaired balance/impaired postural control

## 2019-05-01 NOTE — PHYSICAL THERAPY INITIAL EVALUATION ADULT - IMPAIRED TRANSFERS: SIT/STAND, REHAB EVAL
impaired balance/decreased strength/impaired postural control
decreased strength/impaired balance/impaired postural control

## 2019-05-01 NOTE — DIETITIAN INITIAL EVALUATION ADULT. - PERTINENT LABORATORY DATA
05-01 Na140 mmol/L Glu 179 mg/dL<H> K+ 3.6 mmol/L Cr  0.77 mg/dL BUN 16 mg/dL 05-01 Phos 2.3 mg/dL<L> 04-25 [id number] 6.8 %<H>

## 2019-05-01 NOTE — PHYSICAL THERAPY INITIAL EVALUATION ADULT - PERTINENT HX OF CURRENT PROBLEM, REHAB EVAL
Patient is a 78 year old female admitted to [facility] on 4/24 with crampy abdominal pain, persistent nausea, anorexia, and intermittent vomiting which has been slowly progressing over the preceding two weeks. PMH includes: DM, HTN, HLD.
Patient is a 78 year old female admitted to [facility] on 4/24 with crampy abdominal pain, persistent nausea, anorexia, and intermittent vomiting which has been slowly progressing over the preceding two weeks. PMH includes: DM, HTN, HLD. Pt now s/p Jose resection of colon on 4/29/19.

## 2019-05-01 NOTE — PHYSICAL THERAPY INITIAL EVALUATION ADULT - DISCHARGE DISPOSITION, PT EVAL
to ensure safe use of new assistive device and stair negotiation in the home environment./home w/ home PT
rehabilitation facility

## 2019-05-01 NOTE — PHYSICAL THERAPY INITIAL EVALUATION ADULT - MANUAL MUSCLE TESTING RESULTS, REHAB EVAL
grossly assessed due to/bilateral UEs & LEs grossly at least 3+/5, not formally tested secondary to recent surgery
grossly assessed due to/bilateral UEs & LEs grossly 3+/5 throughout

## 2019-05-01 NOTE — PHYSICAL THERAPY INITIAL EVALUATION ADULT - GAIT DEVIATIONS NOTED, PT EVAL
decreased weight-shifting ability/decreased velocity of limb motion/decreased yeyo/increased time in double stance/decreased step length/decreased stride length
decreased weight-shifting ability/decreased stride length/decreased velocity of limb motion/increased time in double stance/decreased yeyo/decreased step length

## 2019-05-01 NOTE — DIETITIAN INITIAL EVALUATION ADULT. - NS FNS WEIGHT CHANGE REASON
usual body weight reported to be 195lbs in Jan 2019, reports weight loss since then relates to decreased po intake 2/2 illness and pain./unintentional

## 2019-05-01 NOTE — PHYSICAL THERAPY INITIAL EVALUATION ADULT - PHYSICAL ASSIST/NONPHYSICAL ASSIST: SIT/STAND, REHAB EVAL
supervision/nonverbal cues (demo/gestures)/2 person assist/verbal cues
verbal cues/nonverbal cues (demo/gestures)/supervision

## 2019-05-01 NOTE — PHYSICAL THERAPY INITIAL EVALUATION ADULT - PLANNED THERAPY INTERVENTIONS, PT EVAL
bed mobility training/gait training/strengthening/transfer training/stair negotiation
bed mobility training/strengthening/gait training/transfer training

## 2019-05-01 NOTE — DIETITIAN INITIAL EVALUATION ADULT. - PERTINENT MEDS FT
MEDICATIONS  (STANDING):  acetaminophen  IVPB .. 1000 milliGRAM(s) IV Intermittent once  dextrose 5% + sodium chloride 0.45%. 1000 milliLiter(s) (125 mL/Hr) IV Continuous <Continuous>  enoxaparin Injectable 40 milliGRAM(s) SubCutaneous daily  HYDROmorphone PCA (1 mG/mL) 30 milliLiter(s) PCA Continuous PCA Continuous  influenza   Vaccine 0.5 milliLiter(s) IntraMuscular once  insulin lispro (HumaLOG) corrective regimen sliding scale   SubCutaneous every 6 hours  metoprolol tartrate Injectable 5 milliGRAM(s) IV Push every 6 hours    MEDICATIONS  (PRN):  HYDROmorphone PCA (1 mG/mL) Rescue Clinician Bolus 0.5 milliGRAM(s) IV Push every 15 minutes PRN for Pain Scale GREATER THAN 6  naloxone Injectable 0.1 milliGRAM(s) IV Push every 3 minutes PRN For ANY of the following changes in patient status:  A. RR LESS THAN 10 breaths per minute, B. Oxygen saturation LESS THAN 90%, C. Sedation score of 6  ondansetron Injectable 4 milliGRAM(s) IV Push every 6 hours PRN Nausea  tetracaine/benzocaine/butamben Spray 1 Spray(s) Topical every 3 hours PRN Sore throat

## 2019-05-01 NOTE — PHYSICAL THERAPY INITIAL EVALUATION ADULT - PHYSICAL ASSIST/NONPHYSICAL ASSIST: STAND/SIT, REHAB EVAL
2 person assist/supervision/verbal cues/nonverbal cues (demo/gestures)
verbal cues/nonverbal cues (demo/gestures)/supervision

## 2019-05-01 NOTE — DIETITIAN INITIAL EVALUATION ADULT. - ETIOLOGY
related to physiological causes patient meets criteria for severe malnutrition in the context of chronic illness

## 2019-05-01 NOTE — PHYSICAL THERAPY INITIAL EVALUATION ADULT - IMPAIRMENTS FOUND, PT EVAL
gait, locomotion, and balance/posture/muscle strength
gait, locomotion, and balance/muscle strength/posture

## 2019-05-01 NOTE — PHYSICAL THERAPY INITIAL EVALUATION ADULT - ADDITIONAL COMMENTS
Patient reports she lives with her daughter in an apartment (within a private house), 1 flight of stairs to negotiate. Patient reports she was previously independent in all ADLs and used a single axis cane to ambulate outdoors.    Patient was left sitting in chair as found, all lines/tubes intact and call wallace within reach, ANNIE perry
Patient reports she lives with her daughter in an apartment (within a private house), 1 flight of stairs to negotiate. Patient reports she was previously independent in all ADLs and used a single axis cane to ambulate outdoors.    Patient was left semi-supine in bed as found, all lines/tubes intact and call wallace within reach, ANNIE perry

## 2019-05-01 NOTE — PHYSICAL THERAPY INITIAL EVALUATION ADULT - PATIENT PROFILE REVIEW, REHAB EVAL
PT orders received: ambulate as tolerated. Consult with RN Charlotte KOEHLER, pt may participate in PT evaluation./yes

## 2019-05-01 NOTE — PHYSICAL THERAPY INITIAL EVALUATION ADULT - GENERAL OBSERVATIONS, REHAB EVAL
Patient was received semi-supine in bed in NAD, +NG tube, +IV.
Patient was received sitting in chair in NAD, +IV, +colostomy +PCA pump, friend at bedside.

## 2019-05-02 LAB
ANION GAP SERPL CALC-SCNC: 12 MMO/L — SIGNIFICANT CHANGE UP (ref 7–14)
BACTERIA UR CULT: SIGNIFICANT CHANGE UP
BUN SERPL-MCNC: 14 MG/DL — SIGNIFICANT CHANGE UP (ref 7–23)
CALCIUM SERPL-MCNC: 7.6 MG/DL — LOW (ref 8.4–10.5)
CHLORIDE SERPL-SCNC: 101 MMOL/L — SIGNIFICANT CHANGE UP (ref 98–107)
CO2 SERPL-SCNC: 22 MMOL/L — SIGNIFICANT CHANGE UP (ref 22–31)
CREAT SERPL-MCNC: 0.72 MG/DL — SIGNIFICANT CHANGE UP (ref 0.5–1.3)
GLUCOSE BLDC GLUCOMTR-MCNC: 101 MG/DL — HIGH (ref 70–99)
GLUCOSE BLDC GLUCOMTR-MCNC: 113 MG/DL — HIGH (ref 70–99)
GLUCOSE BLDC GLUCOMTR-MCNC: 133 MG/DL — HIGH (ref 70–99)
GLUCOSE BLDC GLUCOMTR-MCNC: 140 MG/DL — HIGH (ref 70–99)
GLUCOSE SERPL-MCNC: 153 MG/DL — HIGH (ref 70–99)
HCT VFR BLD CALC: 32.1 % — LOW (ref 34.5–45)
HGB BLD-MCNC: 10.2 G/DL — LOW (ref 11.5–15.5)
MAGNESIUM SERPL-MCNC: 1.7 MG/DL — SIGNIFICANT CHANGE UP (ref 1.6–2.6)
MCHC RBC-ENTMCNC: 30.8 PG — SIGNIFICANT CHANGE UP (ref 27–34)
MCHC RBC-ENTMCNC: 31.8 % — LOW (ref 32–36)
MCV RBC AUTO: 97 FL — SIGNIFICANT CHANGE UP (ref 80–100)
NRBC # FLD: 0 K/UL — SIGNIFICANT CHANGE UP (ref 0–0)
PHOSPHATE SERPL-MCNC: 1.6 MG/DL — LOW (ref 2.5–4.5)
PLATELET # BLD AUTO: 184 K/UL — SIGNIFICANT CHANGE UP (ref 150–400)
PMV BLD: 11 FL — SIGNIFICANT CHANGE UP (ref 7–13)
POTASSIUM SERPL-MCNC: 3.3 MMOL/L — LOW (ref 3.5–5.3)
POTASSIUM SERPL-SCNC: 3.3 MMOL/L — LOW (ref 3.5–5.3)
RBC # BLD: 3.31 M/UL — LOW (ref 3.8–5.2)
RBC # FLD: 14.7 % — HIGH (ref 10.3–14.5)
SODIUM SERPL-SCNC: 135 MMOL/L — SIGNIFICANT CHANGE UP (ref 135–145)
WBC # BLD: 12.33 K/UL — HIGH (ref 3.8–10.5)
WBC # FLD AUTO: 12.33 K/UL — HIGH (ref 3.8–10.5)

## 2019-05-02 RX ORDER — SODIUM CHLORIDE 9 MG/ML
1000 INJECTION, SOLUTION INTRAVENOUS
Qty: 0 | Refills: 0 | Status: DISCONTINUED | OUTPATIENT
Start: 2019-05-02 | End: 2019-05-03

## 2019-05-02 RX ORDER — MAGNESIUM SULFATE 500 MG/ML
2 VIAL (ML) INJECTION ONCE
Qty: 0 | Refills: 0 | Status: COMPLETED | OUTPATIENT
Start: 2019-05-02 | End: 2019-05-02

## 2019-05-02 RX ORDER — POTASSIUM CHLORIDE 20 MEQ
20 PACKET (EA) ORAL ONCE
Qty: 0 | Refills: 0 | Status: COMPLETED | OUTPATIENT
Start: 2019-05-02 | End: 2019-05-02

## 2019-05-02 RX ORDER — INSULIN LISPRO 100/ML
VIAL (ML) SUBCUTANEOUS
Qty: 0 | Refills: 0 | Status: DISCONTINUED | OUTPATIENT
Start: 2019-05-02 | End: 2019-05-06

## 2019-05-02 RX ORDER — SODIUM CHLORIDE 9 MG/ML
3 INJECTION INTRAMUSCULAR; INTRAVENOUS; SUBCUTANEOUS EVERY 8 HOURS
Qty: 0 | Refills: 0 | Status: DISCONTINUED | OUTPATIENT
Start: 2019-05-02 | End: 2019-05-02

## 2019-05-02 RX ORDER — INSULIN LISPRO 100/ML
VIAL (ML) SUBCUTANEOUS AT BEDTIME
Qty: 0 | Refills: 0 | Status: DISCONTINUED | OUTPATIENT
Start: 2019-05-02 | End: 2019-05-06

## 2019-05-02 RX ORDER — POTASSIUM PHOSPHATE, MONOBASIC POTASSIUM PHOSPHATE, DIBASIC 236; 224 MG/ML; MG/ML
15 INJECTION, SOLUTION INTRAVENOUS ONCE
Qty: 0 | Refills: 0 | Status: COMPLETED | OUTPATIENT
Start: 2019-05-02 | End: 2019-05-02

## 2019-05-02 RX ORDER — DEXTROSE MONOHYDRATE, SODIUM CHLORIDE, AND POTASSIUM CHLORIDE 50; .745; 4.5 G/1000ML; G/1000ML; G/1000ML
1000 INJECTION, SOLUTION INTRAVENOUS
Qty: 0 | Refills: 0 | Status: DISCONTINUED | OUTPATIENT
Start: 2019-05-02 | End: 2019-05-02

## 2019-05-02 RX ADMIN — Medication 5 MILLIGRAM(S): at 05:12

## 2019-05-02 RX ADMIN — SODIUM CHLORIDE 30 MILLILITER(S): 9 INJECTION, SOLUTION INTRAVENOUS at 18:02

## 2019-05-02 RX ADMIN — HYDROMORPHONE HYDROCHLORIDE 30 MILLILITER(S): 2 INJECTION INTRAMUSCULAR; INTRAVENOUS; SUBCUTANEOUS at 20:11

## 2019-05-02 RX ADMIN — Medication 15 MILLIGRAM(S): at 00:28

## 2019-05-02 RX ADMIN — Medication 5 MILLIGRAM(S): at 12:56

## 2019-05-02 RX ADMIN — POTASSIUM PHOSPHATE, MONOBASIC POTASSIUM PHOSPHATE, DIBASIC 62.5 MILLIMOLE(S): 236; 224 INJECTION, SOLUTION INTRAVENOUS at 12:05

## 2019-05-02 RX ADMIN — SODIUM CHLORIDE 3 MILLILITER(S): 9 INJECTION INTRAMUSCULAR; INTRAVENOUS; SUBCUTANEOUS at 13:20

## 2019-05-02 RX ADMIN — Medication 50 GRAM(S): at 09:50

## 2019-05-02 RX ADMIN — HYDROMORPHONE HYDROCHLORIDE 30 MILLILITER(S): 2 INJECTION INTRAMUSCULAR; INTRAVENOUS; SUBCUTANEOUS at 08:16

## 2019-05-02 RX ADMIN — ENOXAPARIN SODIUM 40 MILLIGRAM(S): 100 INJECTION SUBCUTANEOUS at 12:06

## 2019-05-02 RX ADMIN — Medication 1000 MILLIGRAM(S): at 00:28

## 2019-05-02 RX ADMIN — Medication 5 MILLIGRAM(S): at 23:27

## 2019-05-02 RX ADMIN — Medication 20 MILLIEQUIVALENT(S): at 09:51

## 2019-05-03 ENCOUNTER — TRANSCRIPTION ENCOUNTER (OUTPATIENT)
Age: 79
End: 2019-05-03

## 2019-05-03 LAB
ANION GAP SERPL CALC-SCNC: 12 MMO/L — SIGNIFICANT CHANGE UP (ref 7–14)
BUN SERPL-MCNC: 10 MG/DL — SIGNIFICANT CHANGE UP (ref 7–23)
CALCIUM SERPL-MCNC: 7.5 MG/DL — LOW (ref 8.4–10.5)
CHLORIDE SERPL-SCNC: 103 MMOL/L — SIGNIFICANT CHANGE UP (ref 98–107)
CO2 SERPL-SCNC: 22 MMOL/L — SIGNIFICANT CHANGE UP (ref 22–31)
CREAT SERPL-MCNC: 0.6 MG/DL — SIGNIFICANT CHANGE UP (ref 0.5–1.3)
GLUCOSE BLDC GLUCOMTR-MCNC: 106 MG/DL — HIGH (ref 70–99)
GLUCOSE BLDC GLUCOMTR-MCNC: 111 MG/DL — HIGH (ref 70–99)
GLUCOSE BLDC GLUCOMTR-MCNC: 111 MG/DL — HIGH (ref 70–99)
GLUCOSE BLDC GLUCOMTR-MCNC: 154 MG/DL — HIGH (ref 70–99)
GLUCOSE SERPL-MCNC: 121 MG/DL — HIGH (ref 70–99)
HCT VFR BLD CALC: 29 % — LOW (ref 34.5–45)
HGB BLD-MCNC: 9.7 G/DL — LOW (ref 11.5–15.5)
MAGNESIUM SERPL-MCNC: 2.1 MG/DL — SIGNIFICANT CHANGE UP (ref 1.6–2.6)
MCHC RBC-ENTMCNC: 31.3 PG — SIGNIFICANT CHANGE UP (ref 27–34)
MCHC RBC-ENTMCNC: 33.4 % — SIGNIFICANT CHANGE UP (ref 32–36)
MCV RBC AUTO: 93.5 FL — SIGNIFICANT CHANGE UP (ref 80–100)
NRBC # FLD: 0 K/UL — SIGNIFICANT CHANGE UP (ref 0–0)
PHOSPHATE SERPL-MCNC: 2.4 MG/DL — LOW (ref 2.5–4.5)
PLATELET # BLD AUTO: 193 K/UL — SIGNIFICANT CHANGE UP (ref 150–400)
PMV BLD: 11.2 FL — SIGNIFICANT CHANGE UP (ref 7–13)
POTASSIUM SERPL-MCNC: 4 MMOL/L — SIGNIFICANT CHANGE UP (ref 3.5–5.3)
POTASSIUM SERPL-SCNC: 4 MMOL/L — SIGNIFICANT CHANGE UP (ref 3.5–5.3)
RBC # BLD: 3.1 M/UL — LOW (ref 3.8–5.2)
RBC # FLD: 14.8 % — HIGH (ref 10.3–14.5)
SODIUM SERPL-SCNC: 137 MMOL/L — SIGNIFICANT CHANGE UP (ref 135–145)
WBC # BLD: 10.34 K/UL — SIGNIFICANT CHANGE UP (ref 3.8–10.5)
WBC # FLD AUTO: 10.34 K/UL — SIGNIFICANT CHANGE UP (ref 3.8–10.5)

## 2019-05-03 RX ORDER — OXYCODONE HYDROCHLORIDE 5 MG/1
1 TABLET ORAL
Qty: 0 | Refills: 0 | DISCHARGE
Start: 2019-05-03

## 2019-05-03 RX ORDER — SIMVASTATIN 20 MG/1
20 TABLET, FILM COATED ORAL AT BEDTIME
Qty: 0 | Refills: 0 | Status: DISCONTINUED | OUTPATIENT
Start: 2019-05-03 | End: 2019-05-06

## 2019-05-03 RX ORDER — ACETAMINOPHEN 500 MG
2 TABLET ORAL
Qty: 0 | Refills: 0 | DISCHARGE
Start: 2019-05-03

## 2019-05-03 RX ORDER — METOPROLOL TARTRATE 50 MG
50 TABLET ORAL DAILY
Qty: 0 | Refills: 0 | Status: DISCONTINUED | OUTPATIENT
Start: 2019-05-03 | End: 2019-05-06

## 2019-05-03 RX ORDER — OXYCODONE HYDROCHLORIDE 5 MG/1
10 TABLET ORAL EVERY 4 HOURS
Qty: 0 | Refills: 0 | Status: DISCONTINUED | OUTPATIENT
Start: 2019-05-03 | End: 2019-05-06

## 2019-05-03 RX ORDER — ACETAMINOPHEN 500 MG
1000 TABLET ORAL ONCE
Qty: 0 | Refills: 0 | Status: COMPLETED | OUTPATIENT
Start: 2019-05-03 | End: 2019-05-03

## 2019-05-03 RX ORDER — HYDROCHLOROTHIAZIDE 25 MG
12.5 TABLET ORAL DAILY
Qty: 0 | Refills: 0 | Status: DISCONTINUED | OUTPATIENT
Start: 2019-05-03 | End: 2019-05-06

## 2019-05-03 RX ORDER — OXYCODONE HYDROCHLORIDE 5 MG/1
5 TABLET ORAL EVERY 4 HOURS
Qty: 0 | Refills: 0 | Status: DISCONTINUED | OUTPATIENT
Start: 2019-05-03 | End: 2019-05-06

## 2019-05-03 RX ORDER — LISINOPRIL 2.5 MG/1
10 TABLET ORAL DAILY
Qty: 0 | Refills: 0 | Status: DISCONTINUED | OUTPATIENT
Start: 2019-05-03 | End: 2019-05-06

## 2019-05-03 RX ORDER — ACETAMINOPHEN 500 MG
650 TABLET ORAL EVERY 6 HOURS
Qty: 0 | Refills: 0 | Status: DISCONTINUED | OUTPATIENT
Start: 2019-05-03 | End: 2019-05-06

## 2019-05-03 RX ADMIN — Medication 1000 MILLIGRAM(S): at 03:35

## 2019-05-03 RX ADMIN — OXYCODONE HYDROCHLORIDE 10 MILLIGRAM(S): 5 TABLET ORAL at 21:54

## 2019-05-03 RX ADMIN — Medication 5 MILLIGRAM(S): at 07:09

## 2019-05-03 RX ADMIN — Medication 12.5 MILLIGRAM(S): at 15:13

## 2019-05-03 RX ADMIN — Medication 400 MILLIGRAM(S): at 03:05

## 2019-05-03 RX ADMIN — LISINOPRIL 10 MILLIGRAM(S): 2.5 TABLET ORAL at 15:13

## 2019-05-03 RX ADMIN — ENOXAPARIN SODIUM 40 MILLIGRAM(S): 100 INJECTION SUBCUTANEOUS at 11:20

## 2019-05-03 RX ADMIN — SIMVASTATIN 20 MILLIGRAM(S): 20 TABLET, FILM COATED ORAL at 22:30

## 2019-05-03 RX ADMIN — Medication 1: at 12:19

## 2019-05-03 RX ADMIN — HYDROMORPHONE HYDROCHLORIDE 30 MILLILITER(S): 2 INJECTION INTRAMUSCULAR; INTRAVENOUS; SUBCUTANEOUS at 08:28

## 2019-05-03 RX ADMIN — OXYCODONE HYDROCHLORIDE 10 MILLIGRAM(S): 5 TABLET ORAL at 22:24

## 2019-05-03 NOTE — DISCHARGE NOTE PROVIDER - CARE PROVIDER_API CALL
Jacob Hdz)  Surgery  [address], [address]  [place] [address]  Phone: [phone]  Fax: [phone]  Follow Up Time: 1 week

## 2019-05-03 NOTE — DISCHARGE NOTE PROVIDER - NSDCCPTREATMENT_GEN_ALL_CORE_FT
PRINCIPAL PROCEDURE  Procedure: Jose resection of colon  Findings and Treatment: PRINCIPAL PROCEDURE  Procedure: Jose resection of colon  Findings and Treatment: WOUND CARE:  Please keep incisions clean and dry. Please do not Scrub or rub incisions. Do not use lotion or powder on incisions. Please superior aspect of wound with plain packing. Please put wet to dry gauze packing on the inferior aspect of wound and secure with paper tape.  BATHING: You may shower and/or sponge bathe. You may use warm soapy water in the shower and rinse, pat dry.  ACTIVITY: No heavy lifting or straining. Otherwise, you may return to your usual level of physical activity. If you are taking narcotic pain medication DO NOT drive a car, operate machinery or make important decisions.  DIET: Return to your usual diet.  NOTIFY YOUR SURGEON IF YOU HAVE: any bleeding that does not stop, any pus draining from your wound(s), any fever (over 100.4 F) persistent nausea/vomiting, or if your pain is not controlled on your discharge pain medications, unable to urinate.  Please follow up with your primary care physician in one week regarding your hospitalization, bring copies of your discharge paperwork.  Please follow up with your surgeon, Dr. Hdz PRINCIPAL PROCEDURE  Procedure: Jose resection of colon  Findings and Treatment: WOUND CARE:  Please keep incisions clean and dry. Please do not Scrub or rub incisions. Do not use lotion or powder on incisions. Please superior aspect of wound with half inch plain packing. Please put wet to dry gauze packing on the inferior aspect of wound and secure with paper tape.  BATHING: You may shower and/or sponge bathe. You may use warm soapy water in the shower and rinse, pat dry.  ACTIVITY: No heavy lifting or straining. Otherwise, you may return to your usual level of physical activity. If you are taking narcotic pain medication DO NOT drive a car, operate machinery or make important decisions.  DIET: Return to your usual diet.  NOTIFY YOUR SURGEON IF YOU HAVE: any bleeding that does not stop, any pus draining from your wound(s), any fever (over 100.4 F) persistent nausea/vomiting, or if your pain is not controlled on your discharge pain medications, unable to urinate.  Please follow up with your primary care physician in one week regarding your hospitalization, bring copies of your discharge paperwork.  Please follow up with your surgeon, Dr. Hdz

## 2019-05-03 NOTE — DISCHARGE NOTE PROVIDER - HOSPITAL COURSE
78y female - h/o of DM, HTN, HLD - presenting with crampy abdominal pain, persistent nausea, anorexia, and intermittent vomiting which has been slowly progressing over the preceding two weeks. The patient had her first colonoscopy "a few years ago" by a Dr. Acevedo at [facility]. Based upon the description offered during history-taking, it sounds as though she may have had polyps removed during this colonoscopy, and she was supposed to get a repeat in "3 years." She does report unintentional weight loss over the past several months of roughly 20 lbs. She denies any narrow-caliber stools, any bloody or dark stools. She denies any historical episodes of diverticulitis/unexplained abdominal pain.        Patient is able to tolerate at least 4 METs of activity without any chest pain/SOB or significant fatigue.         4/24/19: CT scan: Diffuse small bowel dilatation and fluid-filled distention of the colon     to the level of the distal descending colon, possibly large bowel     obstruction with incompetent ileocecal valve. Possible stricture at the     level of the proximal sigmoid colon.        Sigmoid diverticulosis with intraluminal hyperdense material, possibly     active extravasation of contrast in the setting of gastrointestinal     bleed; an alternative consideration is mucosal enhancement in the setting     of colitis.        Patient NPO with NG tube. GI performed a colonoscopy which showed diverticulosis. findings at colonoscopy seem to suggest a tight stricture which he couldn't get thru. The possibility of a neoplasm still exists.        Patient taken to the OR on 4/29 for a Hartmanns procedure. Post operatively patient hemodynamically stable and transferred to the floor.        Patient with ostomy function, NG tube removed and patient's diet advanced and tolerated. Pain well controlled. Physical therapy seeing patient recommending rehab placement upon discharge.     [place] pathology consistent with adenocarcinoma.    Prescriptions were not e prescribed because patient is being discharged to a rehab facility

## 2019-05-03 NOTE — DISCHARGE NOTE PROVIDER - NSDCFUADDAPPT_GEN_ALL_CORE_FT
Follow up with your surgeon, Dr. Hdz, call for an appointment. Please follow up with your primary care physician regarding your hospitalization. Do not drive while taking pain medications

## 2019-05-04 LAB
GLUCOSE BLDC GLUCOMTR-MCNC: 114 MG/DL — HIGH (ref 70–99)
GLUCOSE BLDC GLUCOMTR-MCNC: 141 MG/DL — HIGH (ref 70–99)
GLUCOSE BLDC GLUCOMTR-MCNC: 155 MG/DL — HIGH (ref 70–99)

## 2019-05-04 RX ADMIN — ENOXAPARIN SODIUM 40 MILLIGRAM(S): 100 INJECTION SUBCUTANEOUS at 12:28

## 2019-05-04 RX ADMIN — OXYCODONE HYDROCHLORIDE 10 MILLIGRAM(S): 5 TABLET ORAL at 21:16

## 2019-05-04 RX ADMIN — SIMVASTATIN 20 MILLIGRAM(S): 20 TABLET, FILM COATED ORAL at 21:16

## 2019-05-04 RX ADMIN — OXYCODONE HYDROCHLORIDE 10 MILLIGRAM(S): 5 TABLET ORAL at 21:46

## 2019-05-04 RX ADMIN — Medication 12.5 MILLIGRAM(S): at 05:47

## 2019-05-04 RX ADMIN — Medication 50 MILLIGRAM(S): at 05:47

## 2019-05-04 RX ADMIN — OXYCODONE HYDROCHLORIDE 10 MILLIGRAM(S): 5 TABLET ORAL at 09:16

## 2019-05-04 RX ADMIN — LISINOPRIL 10 MILLIGRAM(S): 2.5 TABLET ORAL at 05:47

## 2019-05-04 RX ADMIN — Medication 1: at 18:20

## 2019-05-04 RX ADMIN — OXYCODONE HYDROCHLORIDE 10 MILLIGRAM(S): 5 TABLET ORAL at 10:17

## 2019-05-05 LAB
ANION GAP SERPL CALC-SCNC: 10 MMO/L — SIGNIFICANT CHANGE UP (ref 7–14)
BUN SERPL-MCNC: 9 MG/DL — SIGNIFICANT CHANGE UP (ref 7–23)
CALCIUM SERPL-MCNC: 7.7 MG/DL — LOW (ref 8.4–10.5)
CHLORIDE SERPL-SCNC: 99 MMOL/L — SIGNIFICANT CHANGE UP (ref 98–107)
CO2 SERPL-SCNC: 28 MMOL/L — SIGNIFICANT CHANGE UP (ref 22–31)
CREAT SERPL-MCNC: 0.59 MG/DL — SIGNIFICANT CHANGE UP (ref 0.5–1.3)
GLUCOSE BLDC GLUCOMTR-MCNC: 115 MG/DL — HIGH (ref 70–99)
GLUCOSE BLDC GLUCOMTR-MCNC: 126 MG/DL — HIGH (ref 70–99)
GLUCOSE BLDC GLUCOMTR-MCNC: 128 MG/DL — HIGH (ref 70–99)
GLUCOSE BLDC GLUCOMTR-MCNC: 131 MG/DL — HIGH (ref 70–99)
GLUCOSE BLDC GLUCOMTR-MCNC: 136 MG/DL — HIGH (ref 70–99)
GLUCOSE SERPL-MCNC: 118 MG/DL — HIGH (ref 70–99)
HCT VFR BLD CALC: 26.1 % — LOW (ref 34.5–45)
HGB BLD-MCNC: 9 G/DL — LOW (ref 11.5–15.5)
MAGNESIUM SERPL-MCNC: 2 MG/DL — SIGNIFICANT CHANGE UP (ref 1.6–2.6)
MCHC RBC-ENTMCNC: 31.8 PG — SIGNIFICANT CHANGE UP (ref 27–34)
MCHC RBC-ENTMCNC: 34.5 % — SIGNIFICANT CHANGE UP (ref 32–36)
MCV RBC AUTO: 92.2 FL — SIGNIFICANT CHANGE UP (ref 80–100)
NRBC # FLD: 0 K/UL — SIGNIFICANT CHANGE UP (ref 0–0)
PHOSPHATE SERPL-MCNC: 2.4 MG/DL — LOW (ref 2.5–4.5)
PLATELET # BLD AUTO: 169 K/UL — SIGNIFICANT CHANGE UP (ref 150–400)
PMV BLD: 11 FL — SIGNIFICANT CHANGE UP (ref 7–13)
POTASSIUM SERPL-MCNC: 3 MMOL/L — LOW (ref 3.5–5.3)
POTASSIUM SERPL-SCNC: 3 MMOL/L — LOW (ref 3.5–5.3)
RBC # BLD: 2.83 M/UL — LOW (ref 3.8–5.2)
RBC # FLD: 14.6 % — HIGH (ref 10.3–14.5)
SODIUM SERPL-SCNC: 137 MMOL/L — SIGNIFICANT CHANGE UP (ref 135–145)
WBC # BLD: 8.41 K/UL — SIGNIFICANT CHANGE UP (ref 3.8–10.5)
WBC # FLD AUTO: 8.41 K/UL — SIGNIFICANT CHANGE UP (ref 3.8–10.5)

## 2019-05-05 RX ORDER — POTASSIUM PHOSPHATE, MONOBASIC POTASSIUM PHOSPHATE, DIBASIC 236; 224 MG/ML; MG/ML
15 INJECTION, SOLUTION INTRAVENOUS ONCE
Qty: 0 | Refills: 0 | Status: COMPLETED | OUTPATIENT
Start: 2019-05-05 | End: 2019-05-05

## 2019-05-05 RX ORDER — POTASSIUM CHLORIDE 20 MEQ
20 PACKET (EA) ORAL
Qty: 0 | Refills: 0 | Status: COMPLETED | OUTPATIENT
Start: 2019-05-05 | End: 2019-05-05

## 2019-05-05 RX ORDER — BENZOCAINE AND MENTHOL 5; 1 G/100ML; G/100ML
1 LIQUID ORAL
Qty: 0 | Refills: 0 | Status: DISCONTINUED | OUTPATIENT
Start: 2019-05-05 | End: 2019-05-06

## 2019-05-05 RX ADMIN — ENOXAPARIN SODIUM 40 MILLIGRAM(S): 100 INJECTION SUBCUTANEOUS at 11:29

## 2019-05-05 RX ADMIN — Medication 20 MILLIEQUIVALENT(S): at 12:32

## 2019-05-05 RX ADMIN — SIMVASTATIN 20 MILLIGRAM(S): 20 TABLET, FILM COATED ORAL at 21:58

## 2019-05-05 RX ADMIN — OXYCODONE HYDROCHLORIDE 10 MILLIGRAM(S): 5 TABLET ORAL at 10:44

## 2019-05-05 RX ADMIN — OXYCODONE HYDROCHLORIDE 10 MILLIGRAM(S): 5 TABLET ORAL at 11:31

## 2019-05-05 RX ADMIN — Medication 20 MILLIEQUIVALENT(S): at 13:47

## 2019-05-05 RX ADMIN — OXYCODONE HYDROCHLORIDE 10 MILLIGRAM(S): 5 TABLET ORAL at 19:07

## 2019-05-05 RX ADMIN — OXYCODONE HYDROCHLORIDE 10 MILLIGRAM(S): 5 TABLET ORAL at 23:08

## 2019-05-05 RX ADMIN — POTASSIUM PHOSPHATE, MONOBASIC POTASSIUM PHOSPHATE, DIBASIC 62.5 MILLIMOLE(S): 236; 224 INJECTION, SOLUTION INTRAVENOUS at 12:33

## 2019-05-05 RX ADMIN — Medication 20 MILLIEQUIVALENT(S): at 16:18

## 2019-05-05 RX ADMIN — BENZOCAINE AND MENTHOL 1 LOZENGE: 5; 1 LIQUID ORAL at 07:37

## 2019-05-05 RX ADMIN — OXYCODONE HYDROCHLORIDE 10 MILLIGRAM(S): 5 TABLET ORAL at 20:01

## 2019-05-05 RX ADMIN — BENZOCAINE AND MENTHOL 1 LOZENGE: 5; 1 LIQUID ORAL at 12:32

## 2019-05-05 NOTE — PROGRESS NOTE ADULT - ATTENDING COMMENTS
Above noted. Feels better, denies nausea.  Physical Exam: Vital signs are stable.  Abdomen: Soft, tender, colostomy viable with output.  Urine output adequate.  Plan: Clamp NG tube; re-connect if nausea or emesis.  Ambulate.
Above noted.   Plan: Sigmoidoscopy tomorrow.
Above noted. Feels well.  Plan: Check pathology report on Monday.  Awaiting insurance approval for transfer.
Above noted. No change from yesterday.  Plan: Transfer tomorrow.

## 2019-05-06 ENCOUNTER — TRANSCRIPTION ENCOUNTER (OUTPATIENT)
Age: 79
End: 2019-05-06

## 2019-05-06 VITALS
RESPIRATION RATE: 17 BRPM | DIASTOLIC BLOOD PRESSURE: 52 MMHG | TEMPERATURE: 99 F | SYSTOLIC BLOOD PRESSURE: 108 MMHG | OXYGEN SATURATION: 94 % | HEART RATE: 91 BPM

## 2019-05-06 LAB
ANION GAP SERPL CALC-SCNC: 8 MMO/L — SIGNIFICANT CHANGE UP (ref 7–14)
BUN SERPL-MCNC: 6 MG/DL — LOW (ref 7–23)
CALCIUM SERPL-MCNC: 7.5 MG/DL — LOW (ref 8.4–10.5)
CHLORIDE SERPL-SCNC: 102 MMOL/L — SIGNIFICANT CHANGE UP (ref 98–107)
CO2 SERPL-SCNC: 27 MMOL/L — SIGNIFICANT CHANGE UP (ref 22–31)
CREAT SERPL-MCNC: 0.57 MG/DL — SIGNIFICANT CHANGE UP (ref 0.5–1.3)
GLUCOSE BLDC GLUCOMTR-MCNC: 107 MG/DL — HIGH (ref 70–99)
GLUCOSE BLDC GLUCOMTR-MCNC: 111 MG/DL — HIGH (ref 70–99)
GLUCOSE SERPL-MCNC: 101 MG/DL — HIGH (ref 70–99)
HCT VFR BLD CALC: 25.3 % — LOW (ref 34.5–45)
HGB BLD-MCNC: 8.5 G/DL — LOW (ref 11.5–15.5)
MAGNESIUM SERPL-MCNC: 1.9 MG/DL — SIGNIFICANT CHANGE UP (ref 1.6–2.6)
MCHC RBC-ENTMCNC: 30.9 PG — SIGNIFICANT CHANGE UP (ref 27–34)
MCHC RBC-ENTMCNC: 33.6 % — SIGNIFICANT CHANGE UP (ref 32–36)
MCV RBC AUTO: 92 FL — SIGNIFICANT CHANGE UP (ref 80–100)
NRBC # FLD: 0 K/UL — SIGNIFICANT CHANGE UP (ref 0–0)
PHOSPHATE SERPL-MCNC: 2.9 MG/DL — SIGNIFICANT CHANGE UP (ref 2.5–4.5)
PLATELET # BLD AUTO: 169 K/UL — SIGNIFICANT CHANGE UP (ref 150–400)
PMV BLD: 10.9 FL — SIGNIFICANT CHANGE UP (ref 7–13)
POTASSIUM SERPL-MCNC: 3.1 MMOL/L — LOW (ref 3.5–5.3)
POTASSIUM SERPL-SCNC: 3.1 MMOL/L — LOW (ref 3.5–5.3)
RBC # BLD: 2.75 M/UL — LOW (ref 3.8–5.2)
RBC # FLD: 14.5 % — SIGNIFICANT CHANGE UP (ref 10.3–14.5)
SODIUM SERPL-SCNC: 137 MMOL/L — SIGNIFICANT CHANGE UP (ref 135–145)
WBC # BLD: 8.48 K/UL — SIGNIFICANT CHANGE UP (ref 3.8–10.5)
WBC # FLD AUTO: 8.48 K/UL — SIGNIFICANT CHANGE UP (ref 3.8–10.5)

## 2019-05-06 RX ORDER — POTASSIUM CHLORIDE 20 MEQ
40 PACKET (EA) ORAL ONCE
Qty: 0 | Refills: 0 | Status: COMPLETED | OUTPATIENT
Start: 2019-05-06 | End: 2019-05-06

## 2019-05-06 RX ADMIN — OXYCODONE HYDROCHLORIDE 10 MILLIGRAM(S): 5 TABLET ORAL at 09:45

## 2019-05-06 RX ADMIN — OXYCODONE HYDROCHLORIDE 10 MILLIGRAM(S): 5 TABLET ORAL at 09:07

## 2019-05-06 RX ADMIN — OXYCODONE HYDROCHLORIDE 10 MILLIGRAM(S): 5 TABLET ORAL at 00:06

## 2019-05-06 RX ADMIN — Medication 40 MILLIEQUIVALENT(S): at 09:02

## 2019-05-06 RX ADMIN — ENOXAPARIN SODIUM 40 MILLIGRAM(S): 100 INJECTION SUBCUTANEOUS at 13:04

## 2019-05-06 NOTE — PROGRESS NOTE ADULT - SUBJECTIVE AND OBJECTIVE BOX
Anesthesia Pain Management Service- Attending Addendum    SUBJECTIVE: Pt doing well with IV PCA without problems reported.    Therapy:	  [ X] IV PCA	   [ ] Epidural           [ ] s/p Spinal Opoid              [ ] Postpartum infusion	  [ ] Patient controlled regional anesthesia (PCRA)    [ ] prn Analgesics    Allergies    No Known Allergies    Intolerances      MEDICATIONS  (STANDING):  acetaminophen  IVPB .. 1000 milliGRAM(s) IV Intermittent once  dextrose 5% + sodium chloride 0.45%. 1000 milliLiter(s) (125 mL/Hr) IV Continuous <Continuous>  enoxaparin Injectable 40 milliGRAM(s) SubCutaneous daily  HYDROmorphone PCA (1 mG/mL) 30 milliLiter(s) PCA Continuous PCA Continuous  influenza   Vaccine 0.5 milliLiter(s) IntraMuscular once  insulin lispro (HumaLOG) corrective regimen sliding scale   SubCutaneous every 6 hours  ketorolac   Injectable 15 milliGRAM(s) IV Push every 6 hours  metoprolol tartrate Injectable 5 milliGRAM(s) IV Push every 6 hours    MEDICATIONS  (PRN):  HYDROmorphone PCA (1 mG/mL) Rescue Clinician Bolus 0.5 milliGRAM(s) IV Push every 15 minutes PRN for Pain Scale GREATER THAN 6  naloxone Injectable 0.1 milliGRAM(s) IV Push every 3 minutes PRN For ANY of the following changes in patient status:  A. RR LESS THAN 10 breaths per minute, B. Oxygen saturation LESS THAN 90%, C. Sedation score of 6  ondansetron Injectable 4 milliGRAM(s) IV Push every 6 hours PRN Nausea  tetracaine/benzocaine/butamben Spray 1 Spray(s) Topical every 3 hours PRN Sore throat      OBJECTIVE:   [X] No new signs     [ ] Other:    Side Effects:  [X ] None			[ ] Other:    Assessment of Catheter Site:		[ ] Intact		[ ] Other:    ASSESSMENT/PLAN  [ X] Continue current therapy    [ ] Therapy changed to:    [ ] IV PCA       [ ] Epidural     [ ] prn Analgesics     Comments:
ACUTE CARE SURGERY PROGRESS NOTE:   Pager: [phone]    Subjective:  Patient without issue overnight.  She is tolerating regular diet with improved appetite.  She denies nausea/vomiting.  Pain controlled.      Vital Signs Last 24 Hrs  T(C): 37.1 (05 May 2019 08:50), Max: 37.1 (04 May 2019 14:12)  T(F): 98.7 (05 May 2019 08:50), Max: 98.7 (04 May 2019 14:12)  HR: 88 (05 May 2019 08:50) (82 - 90)  BP: 114/90 (05 May 2019 08:50) (103/52 - 114/90)  BP(mean): --  RR: 18 (05 May 2019 08:50) (18 - 19)  SpO2: 98% (05 May 2019 08:50) (95% - 98%)    05-04-19 @ 07:01  -  05-05-19 @ 07:00  --------------------------------------------------------  IN: 540 mL / OUT: 1450 mL / NET: -910 mL    05-05-19 @ 07:01  -  05-05-19 @ 11:38  --------------------------------------------------------  IN: 0 mL / OUT: 300 mL / NET: -300 mL        Gen: NAD  HEENT: normocephalic, atraumatic, no scleral icterus  CV: S1, S2, RRR  Pulm: CTA B/L  Abd: Soft, nontender, nondistended. Midline wound with inferior aspect open and packed with wet to dry gauze. No purulent drainage or malodor. Ostomy with gas and stool within bag.     CBC (05-05 @ 07:08)                              9.0<L>                         8.41    )----------------(  169        --    % Neutrophils, --    % Lymphocytes, ANC: --                                  26.1<L>                BMP (05-05 @ 07:09)             137     |  99      |  9     		Ca++ --      Ca 7.7<L>             ---------------------------------( 118<H>		Mg 2.0                3.0<L>  |  28      |  0.59  			Ph 2.4<L>            -> URINE BLADDER Culture (04-29 @ 14:35)     NG    NG  NG                    ( 29 Apr 2019 16:06 ) pH: 7.38  /  pCO2: 38    /  pO2: 341   / HCO3: 23    / Base Excess: -2.4  /  SaO2: 99.7            ( 29 Apr 2019 14:10 ) pH: 7.41  /  pCO2: 36    /  pO2: 456   / HCO3: 23    / Base Excess: -1.8  /  SaO2: 99.6
ACUTE CARE SURGERY PROGRESS NOTE:   Pager: [phone]    Postoperative Day: 5    Interim Events: Gas and stool within ostomy appliance. Patient tolerated Regular diet without nausea or vomiting. Patient without complaints this morning, seen with her sister at bedside.     Vital Signs (24 Hrs):  T(C): 36.9 (05-04-19 @ 05:44), Max: 37.4 (05-03-19 @ 22:15)  HR: 96 (05-04-19 @ 05:44) (80 - 98)  BP: 126/56 (05-04-19 @ 05:44) (109/49 - 127/58)  RR: 20 (05-04-19 @ 05:44) (18 - 21)  SpO2: 95% (05-04-19 @ 05:44) (95% - 97%)  Wt(kg): --  Daily     Daily     I&O's Summary    03 May 2019 07:01  -  04 May 2019 07:00  --------------------------------------------------------  IN: 1440 mL / OUT: 1625 mL / NET: -185 mL    Gen: NAD  HEENT: normocephalic, atraumatic, no scleral icterus  CV: S1, S2, RRR  Pulm: CTA B/L  Abd: Soft, nontender, nondistended. Midline wound with inferior aspect open and packed with wet to dry gauze. No purulent drainage or malodor. Ostomy with gas and stool within bag.     I&O's Detail    02 May 2019 07:01  -  03 May 2019 07:00  --------------------------------------------------------  IN:    dextrose 5% + sodium chloride 0.45% with potassium chloride 20 mEq/L: 200 mL    IV PiggyBack: 300 mL    lactated ringers.: 480 mL    Oral Fluid: 980 mL  Total IN: 1960 mL    OUT:    Colostomy: 740 mL    Voided: 600 mL  Total OUT: 1340 mL    Total NET: 620 mL          Labs:                                      9.7    10.34 )-----------( 193      ( 03 May 2019 07:35 )             29.0       05-03    137  |  103  |  10  ----------------------------<  121<H>  4.0   |  22  |  0.60    Ca    7.5<L>      03 May 2019 07:35  Phos  2.4     05-03  Mg     2.1     05-03                ( 29 Apr 2019 16:06 ) pH: 7.38  /  pCO2: 38    /  pO2: 341   / HCO3: 23    / Base Excess: -2.4  /  SaO2: 99.7            ( 29 Apr 2019 14:10 ) pH: 7.41  /  pCO2: 36    /  pO2: 456   / HCO3: 23    / Base Excess: -1.8  /  SaO2: 99.6
ACUTE CARE SURGERY PROGRESS NOTE:   Pager: [phone]    Postoperative Day: 4    Interim Events: Gas and stool within ostomy appliance. Patient tolerated Regular diet without nausea or vomiting. Patient without complaints this morning, seen with her sister at bedside.         Physical Exam  Vital Signs Last 24 Hrs  T(C): 36.3 (03 May 2019 09:30), Max: 37.6 (03 May 2019 02:05)  T(F): 97.3 (03 May 2019 09:30), Max: 99.7 (03 May 2019 02:05)  HR: 88 (03 May 2019 09:30) (75 - 100)  BP: 113/50 (03 May 2019 09:30) (106/55 - 123/57)  BP(mean): --  RR: 20 (03 May 2019 09:30) (16 - 20)  SpO2: 97% (03 May 2019 09:30) (92% - 97%)    Gen: NAD  HEENT: normocephalic, atraumatic, no scleral icterus  CV: S1, S2, RRR  Pulm: CTA B/L  Abd: Soft, nontender, nondistended. Midline wound with inferior aspect open and packed with wet to dry gauze. No purulent drainage or malodor. Ostomy with gas and stool within bag.     I&O's Detail    02 May 2019 07:01  -  03 May 2019 07:00  --------------------------------------------------------  IN:    dextrose 5% + sodium chloride 0.45% with potassium chloride 20 mEq/L: 200 mL    IV PiggyBack: 300 mL    lactated ringers.: 480 mL    Oral Fluid: 980 mL  Total IN: 1960 mL    OUT:    Colostomy: 740 mL    Voided: 600 mL  Total OUT: 1340 mL    Total NET: 620 mL          Labs:                        9.7    10.34 )-----------( 193      ( 03 May 2019 07:35 )             29.0     05-03    137  |  103  |  10  ----------------------------<  121<H>  4.0   |  22  |  0.60    Ca    7.5<L>      03 May 2019 07:35  Phos  2.4     05-03  Mg     2.1     05-03
ANESTHESIA POSTOP CHECK    78y Female POSTOP DAY 1    Vital Signs Last 24 Hrs  T(C): 37.1 (30 Apr 2019 10:56), Max: 37.1 (30 Apr 2019 10:56)  T(F): 98.8 (30 Apr 2019 10:56), Max: 98.8 (30 Apr 2019 10:56)  HR: 101 (30 Apr 2019 10:56) (82 - 103)  BP: 110/51 (30 Apr 2019 10:56) (97/51 - 136/60)  BP(mean): 62 (30 Apr 2019 02:00) (58 - 86)  RR: 16 (30 Apr 2019 10:56) (14 - 22)  SpO2: 98% (30 Apr 2019 10:56) (92% - 99%)  I&O's Summary    29 Apr 2019 07:01  -  30 Apr 2019 07:00  --------------------------------------------------------  IN: 1550 mL / OUT: 920 mL / NET: 630 mL    30 Apr 2019 07:01  -  30 Apr 2019 11:48  --------------------------------------------------------  IN: 0 mL / OUT: 100 mL / NET: -100 mL        [X ] NO APPARENT ANESTHESIA COMPLICATIONS
Anesthesia Pain Management Service    SUBJECTIVE: Patient is doing well with IV PCA and no significant problems reported.    Pain Scale Score	At rest: 7/10___ 	With Activity: ___ 	[X ] Refer to charted pain scores    THERAPY:    [ ] IV PCA Morphine		[ ] 5 mg/mL	[ ] 1 mg/mL  [X ] IV PCA Hydromorphone	[ ] 5 mg/mL	[X ] 1 mg/mL  [ ] IV PCA Fentanyl		[ ] 50 micrograms/mL    Demand dose __0.2_ lockout __6_ (minutes) Continuous Rate _0__ Total: _3.35__  mg used (in past 24 hours)      MEDICATIONS  (STANDING):  acetaminophen  IVPB .. 1000 milliGRAM(s) IV Intermittent once  enoxaparin Injectable 40 milliGRAM(s) SubCutaneous daily  HYDROmorphone PCA (1 mG/mL) 30 milliLiter(s) PCA Continuous PCA Continuous  influenza   Vaccine 0.5 milliLiter(s) IntraMuscular once  insulin lispro (HumaLOG) corrective regimen sliding scale   SubCutaneous every 6 hours  ketorolac   Injectable 15 milliGRAM(s) IV Push once  lactated ringers. 1000 milliLiter(s) (125 mL/Hr) IV Continuous <Continuous>  metoprolol tartrate Injectable 5 milliGRAM(s) IV Push every 6 hours    MEDICATIONS  (PRN):  HYDROmorphone PCA (1 mG/mL) Rescue Clinician Bolus 0.5 milliGRAM(s) IV Push every 15 minutes PRN for Pain Scale GREATER THAN 6  naloxone Injectable 0.1 milliGRAM(s) IV Push every 3 minutes PRN For ANY of the following changes in patient status:  A. RR LESS THAN 10 breaths per minute, B. Oxygen saturation LESS THAN 90%, C. Sedation score of 6  ondansetron Injectable 4 milliGRAM(s) IV Push every 6 hours PRN Nausea  tetracaine/benzocaine/butamben Spray 1 Spray(s) Topical every 3 hours PRN Sore throat      OBJECTIVE:  Patient lying in bed, NPO with NGT.    Sedation Score:	[ X] Alert	[ ] Drowsy 	[ ] Arousable	[ ] Asleep	[ ] Unresponsive    Side Effects:	[X ] None	[ ] Nausea	[ ] Vomiting	[ ] Pruritus  		[ ] Other:    Vital Signs Last 24 Hrs  T(C): 36.7 (30 Apr 2019 03:00), Max: 36.8 (29 Apr 2019 20:15)  T(F): 98.1 (30 Apr 2019 03:00), Max: 98.2 (29 Apr 2019 20:15)  HR: 102 (30 Apr 2019 03:00) (81 - 103)  BP: 100/53 (30 Apr 2019 03:00) (97/51 - 136/60)  BP(mean): 62 (30 Apr 2019 02:00) (58 - 86)  RR: 16 (30 Apr 2019 03:00) (14 - 22)  SpO2: 98% (30 Apr 2019 03:00) (92% - 99%)    ASSESSMENT/ PLAN    Therapy to  be:	[ X] Continue   [ ] Discontinued   [ ] Change to prn Analgesics    Documentation and Verification of current medications:   [X] Done	[ ] Not done, not elligible    Comments:  Continue current pain regimen, IV Tylenol added by team.
Anesthesia Pain Management Service    SUBJECTIVE: Patient is doing well with IV PCA and no significant problems reported.    Pain Scale Score	At rest: _3__ 	With Activity: ___ 	[X ] Refer to charted pain scores    THERAPY:    [ ] IV PCA Morphine		[ ] 5 mg/mL	[ ] 1 mg/mL  [X ] IV PCA Hydromorphone	[ ] 5 mg/mL	[X ] 1 mg/mL  [ ] IV PCA Fentanyl		[ ] 50 micrograms/mL    Demand dose __0.2_ lockout __6_ (minutes) Continuous Rate _0__ Total: _3.45__   mg used (in past 24 hrs)      MEDICATIONS  (STANDING):  enoxaparin Injectable 40 milliGRAM(s) SubCutaneous daily  hydrochlorothiazide 12.5 milliGRAM(s) Oral daily  influenza   Vaccine 0.5 milliLiter(s) IntraMuscular once  insulin lispro (HumaLOG) corrective regimen sliding scale   SubCutaneous three times a day before meals  insulin lispro (HumaLOG) corrective regimen sliding scale   SubCutaneous at bedtime  lactated ringers. 1000 milliLiter(s) (30 mL/Hr) IV Continuous <Continuous>  lisinopril 10 milliGRAM(s) Oral daily  metoprolol tartrate 50 milliGRAM(s) Oral daily  simvastatin 20 milliGRAM(s) Oral at bedtime    MEDICATIONS  (PRN):  acetaminophen   Tablet .. 650 milliGRAM(s) Oral every 6 hours PRN Mild Pain (1 - 3)  oxyCODONE    IR 5 milliGRAM(s) Oral every 4 hours PRN Moderate Pain (4 - 6)  oxyCODONE    IR 10 milliGRAM(s) Oral every 4 hours PRN Severe Pain (7 - 10)      OBJECTIVE: laying in bed     Sedation Score:	[ X] Alert	[ ] Drowsy 	[ ] Arousable	[ ] Asleep	[ ] Unresponsive    Side Effects:	[X ] None	[ ] Nausea	[ ] Vomiting	[ ] Pruritus  		[ ] Other:    Vital Signs Last 24 Hrs  T(C): 36.3 (03 May 2019 09:30), Max: 37.6 (03 May 2019 02:05)  T(F): 97.3 (03 May 2019 09:30), Max: 99.7 (03 May 2019 02:05)  HR: 88 (03 May 2019 09:30) (75 - 100)  BP: 113/50 (03 May 2019 09:30) (106/55 - 123/57)  BP(mean): --  RR: 20 (03 May 2019 09:30) (16 - 20)  SpO2: 97% (03 May 2019 09:30) (92% - 97%)    ASSESSMENT/ PLAN    Therapy to  be:	[ ] Continue   [ X] Discontinued   [X ] Change to prn Analgesics    Documentation and Verification of current medications:   [X] Done	[ ] Not done, not elligible    Comments: PRN Oral/IV opioids and/or Adjuvant medication to be ordered at this point.
Anesthesia Pain Management Service    SUBJECTIVE: Patient is doing well with IV PCA and no significant problems reported.    Pain Scale Score	At rest: _3__ 	With Activity: ___ 	[X ] Refer to charted pain scores    THERAPY:    [ ] IV PCA Morphine		[ ] 5 mg/mL	[ ] 1 mg/mL  [X ] IV PCA Hydromorphone	[ ] 5 mg/mL	[X ] 1 mg/mL  [ ] IV PCA Fentanyl		[ ] 50 micrograms/mL    Demand dose __0.2_ lockout __6_ (minutes) Continuous Rate _0__ Total: _5.5__  mg used (in past 24 hours)      MEDICATIONS  (STANDING):  acetaminophen  IVPB .. 1000 milliGRAM(s) IV Intermittent once  acetaminophen  IVPB .. 1000 milliGRAM(s) IV Intermittent once  dextrose 5% + sodium chloride 0.45%. 1000 milliLiter(s) (125 mL/Hr) IV Continuous <Continuous>  enoxaparin Injectable 40 milliGRAM(s) SubCutaneous daily  HYDROmorphone PCA (1 mG/mL) 30 milliLiter(s) PCA Continuous PCA Continuous  influenza   Vaccine 0.5 milliLiter(s) IntraMuscular once  insulin lispro (HumaLOG) corrective regimen sliding scale   SubCutaneous every 6 hours  metoprolol tartrate Injectable 5 milliGRAM(s) IV Push every 6 hours  potassium chloride  10 mEq/100 mL IVPB 10 milliEquivalent(s) IV Intermittent every 1 hour    MEDICATIONS  (PRN):  HYDROmorphone PCA (1 mG/mL) Rescue Clinician Bolus 0.5 milliGRAM(s) IV Push every 15 minutes PRN for Pain Scale GREATER THAN 6  naloxone Injectable 0.1 milliGRAM(s) IV Push every 3 minutes PRN For ANY of the following changes in patient status:  A. RR LESS THAN 10 breaths per minute, B. Oxygen saturation LESS THAN 90%, C. Sedation score of 6  ondansetron Injectable 4 milliGRAM(s) IV Push every 6 hours PRN Nausea  tetracaine/benzocaine/butamben Spray 1 Spray(s) Topical every 3 hours PRN Sore throat      OBJECTIVE:    Sedation Score:	[ X] Alert	[ ] Drowsy 	[ ] Arousable	[ ] Asleep	[ ] Unresponsive    Side Effects:	[X ] None	[ ] Nausea	[ ] Vomiting	[ ] Pruritus  		[ ] Other:    Vital Signs Last 24 Hrs  T(C): 36.8 (01 May 2019 09:21), Max: 37.2 (30 Apr 2019 12:54)  T(F): 98.3 (01 May 2019 09:21), Max: 98.9 (30 Apr 2019 12:54)  HR: 101 (01 May 2019 09:21) (97 - 110)  BP: 105/52 (01 May 2019 09:21) (91/49 - 112/58)  BP(mean): --  RR: 17 (01 May 2019 09:21) (16 - 18)  SpO2: 95% (01 May 2019 09:21) (94% - 97%)    ASSESSMENT/ PLAN    Therapy to  be:	[ X] Continue   [ ] Discontinued   [ ] Change to prn Analgesics    Documentation and Verification of current medications:   [X] Done	[ ] Not done, not elligible    Comments: NPO, continue current therapy
Anesthesia Pain Management Service    SUBJECTIVE: Patient is doing well with IV PCA and no significant problems reported.  Patient is NPO and was told she will start advancing diet today.     Pain Scale Score	At rest: __5-7/10_ 	With Activity: ___ 	[X ] Refer to charted pain scores    THERAPY:    [ ] IV PCA Morphine		[ ] 5 mg/mL	[ ] 1 mg/mL  [X ] IV PCA Hydromorphone	[ ] 5 mg/mL	[X ] 1 mg/mL  [ ] IV PCA Fentanyl		[ ] 50 micrograms/mL    Demand dose __0.2_ lockout __6_ (minutes) Continuous Rate _0__ Total: _1.5_  mg used (in past 24 hours)      MEDICATIONS  (STANDING):  enoxaparin Injectable 40 milliGRAM(s) SubCutaneous daily  HYDROmorphone PCA (1 mG/mL) 30 milliLiter(s) PCA Continuous PCA Continuous  influenza   Vaccine 0.5 milliLiter(s) IntraMuscular once  insulin lispro (HumaLOG) corrective regimen sliding scale   SubCutaneous three times a day before meals  insulin lispro (HumaLOG) corrective regimen sliding scale   SubCutaneous at bedtime  metoprolol tartrate Injectable 5 milliGRAM(s) IV Push every 6 hours  potassium phosphate IVPB 15 milliMole(s) IV Intermittent once  sodium chloride 0.9% lock flush 3 milliLiter(s) IV Push every 8 hours    MEDICATIONS  (PRN):  HYDROmorphone PCA (1 mG/mL) Rescue Clinician Bolus 0.5 milliGRAM(s) IV Push every 15 minutes PRN for Pain Scale GREATER THAN 6  naloxone Injectable 0.1 milliGRAM(s) IV Push every 3 minutes PRN For ANY of the following changes in patient status:  A. RR LESS THAN 10 breaths per minute, B. Oxygen saturation LESS THAN 90%, C. Sedation score of 6  ondansetron Injectable 4 milliGRAM(s) IV Push every 6 hours PRN Nausea      OBJECTIVE: Patient is NPO, alert and oriented, but forgetful. Patient is sitting up in chair, smiling, and hard of hearing. Appears comfortable.    Sedation Score:	[ X] Alert	[ ] Drowsy 	[ ] Arousable	[ ] Asleep	[ ] Unresponsive    Side Effects:	[X ] None	[ ] Nausea	[ ] Vomiting	[ ] Pruritus  		[ ] Other:    Vital Signs Last 24 Hrs  T(C): 36.7 (02 May 2019 10:45), Max: 36.8 (02 May 2019 02:00)  T(F): 98 (02 May 2019 10:45), Max: 98.2 (02 May 2019 02:00)  HR: 81 (02 May 2019 10:45) (81 - 100)  BP: 103/55 (02 May 2019 10:45) (97/52 - 122/68)  BP(mean): --  RR: 17 (02 May 2019 10:45) (16 - 17)  SpO2: 98% (02 May 2019 10:45) (94% - 98%)    ASSESSMENT/ PLAN    Therapy to  be:	[ X] Continue   [ ] Discontinued   [ ] Change to prn Analgesics    Documentation and Verification of current medications:   [X] Done	[ ] Not done, not elligible    Comments:  Continue current pain regimen.  Progress Note written now but Patient was seen earlier.
Anesthesia Pain Management Service    SUBJECTIVE: Pt doing well with IV PCA without problems reported.    Therapy:	  [ X] IV PCA	   [ ] Epidural           [ ] s/p Spinal Opoid              [ ] Postpartum infusion	  [ ] Patient controlled regional anesthesia (PCRA)    [ ] prn Analgesics    Allergies    No Known Allergies    Intolerances      MEDICATIONS  (STANDING):  acetaminophen  IVPB .. 1000 milliGRAM(s) IV Intermittent once  dextrose 5% + sodium chloride 0.45%. 1000 milliLiter(s) (125 mL/Hr) IV Continuous <Continuous>  enoxaparin Injectable 40 milliGRAM(s) SubCutaneous daily  HYDROmorphone PCA (1 mG/mL) 30 milliLiter(s) PCA Continuous PCA Continuous  influenza   Vaccine 0.5 milliLiter(s) IntraMuscular once  insulin lispro (HumaLOG) corrective regimen sliding scale   SubCutaneous every 6 hours  metoprolol tartrate Injectable 5 milliGRAM(s) IV Push every 6 hours    MEDICATIONS  (PRN):  HYDROmorphone PCA (1 mG/mL) Rescue Clinician Bolus 0.5 milliGRAM(s) IV Push every 15 minutes PRN for Pain Scale GREATER THAN 6  naloxone Injectable 0.1 milliGRAM(s) IV Push every 3 minutes PRN For ANY of the following changes in patient status:  A. RR LESS THAN 10 breaths per minute, B. Oxygen saturation LESS THAN 90%, C. Sedation score of 6  ondansetron Injectable 4 milliGRAM(s) IV Push every 6 hours PRN Nausea  tetracaine/benzocaine/butamben Spray 1 Spray(s) Topical every 3 hours PRN Sore throat      OBJECTIVE:   [X] No new signs     [ ] Other:    Side Effects:  [X ] None			[ ] Other:    Assessment of Catheter Site:		[ ] Intact		[ ] Other:    ASSESSMENT/PLAN  [ X] Continue current therapy    [ ] Therapy changed to:    [ ] IV PCA       [ ] Epidural     [ ] prn Analgesics     Comments:    Progress Note written now but Patient was seen earlier.
Anesthesia Pain Management Service    SUBJECTIVE: Pt doing well with IV PCA without problems reported.    Therapy:	  [ X] IV PCA	   [ ] Epidural           [ ] s/p Spinal Opoid              [ ] Postpartum infusion	  [ ] Patient controlled regional anesthesia (PCRA)    [ ] prn Analgesics    Allergies    No Known Allergies    Intolerances      MEDICATIONS  (STANDING):  enoxaparin Injectable 40 milliGRAM(s) SubCutaneous daily  HYDROmorphone PCA (1 mG/mL) 30 milliLiter(s) PCA Continuous PCA Continuous  influenza   Vaccine 0.5 milliLiter(s) IntraMuscular once  insulin lispro (HumaLOG) corrective regimen sliding scale   SubCutaneous three times a day before meals  insulin lispro (HumaLOG) corrective regimen sliding scale   SubCutaneous at bedtime  lactated ringers. 1000 milliLiter(s) (30 mL/Hr) IV Continuous <Continuous>  metoprolol tartrate Injectable 5 milliGRAM(s) IV Push every 6 hours    MEDICATIONS  (PRN):  HYDROmorphone PCA (1 mG/mL) Rescue Clinician Bolus 0.5 milliGRAM(s) IV Push every 15 minutes PRN for Pain Scale GREATER THAN 6  naloxone Injectable 0.1 milliGRAM(s) IV Push every 3 minutes PRN For ANY of the following changes in patient status:  A. RR LESS THAN 10 breaths per minute, B. Oxygen saturation LESS THAN 90%, C. Sedation score of 6  ondansetron Injectable 4 milliGRAM(s) IV Push every 6 hours PRN Nausea      OBJECTIVE:   [X] No new signs     [ ] Other:    Side Effects:  [X ] None			[ ] Other:    Assessment of Catheter Site:		[ ] Intact		[ ] Other:    ASSESSMENT/PLAN  [ X] Continue current therapy    [ ] Therapy changed to:    [ ] IV PCA       [ ] Epidural     [ ] prn Analgesics     Comments:    Progress Note written now but Patient was seen earlier.
Day ___ of Anesthesia Pain Management Service    SUBJECTIVE: the pain pump was good    Therapy:	  [x ] IV PCA	   [ ] Epidural           [ ] s/p Spinal Opoid              [ ] Postpartum infusion	  [ ] Patient controlled regional anesthesia (PCRA)    [ ] prn Analgesics    OBJECTIVE:   [ x] No new signs     [ ] Other:    Side Effects:  [x ] None			[ ] Other:    Assessment of Catheter Site:		[ ] Intact		[ ] Other:    ASSESSMENT/PLAN  [ ] Continue current therapy    [ ] Therapy changed to:    [ ] IV PCA       [ ] Epidural     [x ] prn Analgesics     Comments: d/c pca and transition to po analgesia
[facility]  Alonzo Menendez PA-C  [address] [place] [address]  [phone]      INTERVAL HPI/OVERNIGHT EVENTS:    s/p sigmoidoscopy yesterday  unable to traverse lesion  ngt in place    MEDICATIONS  (STANDING):  dextrose 5% + sodium chloride 0.9% 1000 milliLiter(s) (125 mL/Hr) IV Continuous <Continuous>  heparin  Injectable 5000 Unit(s) SubCutaneous every 8 hours  influenza   Vaccine 0.5 milliLiter(s) IntraMuscular once  insulin lispro (HumaLOG) corrective regimen sliding scale   SubCutaneous every 6 hours  metoprolol tartrate Injectable 5 milliGRAM(s) IV Push every 6 hours  piperacillin/tazobactam IVPB. 3.375 Gram(s) IV Intermittent every 8 hours    MEDICATIONS  (PRN):  tetracaine/benzocaine/butamben Spray 1 Spray(s) Topical every 3 hours PRN Sore throat      Allergies    No Known Allergies    Intolerances        ROS:   General:  No wt loss, fevers, chills, night sweats, fatigue,   Eyes:  Good vision, no reported pain  ENT:  No sore throat, pain, runny nose, dysphagia  CV:  No pain, palpitations, hypo/hypertension  Resp:  No dyspnea, cough, tachypnea, wheezing  GI:  + pain, No nausea, No vomiting, No diarrhea, No constipation, No weight loss, No fever, No pruritis, No rectal bleeding, No tarry stools, No dysphagia,  :  No pain, bleeding, incontinence, nocturia  Muscle:  No pain, weakness  Neuro:  No weakness, tingling, memory problems  Psych:  No fatigue, insomnia, mood problems, depression  Endocrine:  No polyuria, polydipsia, cold/heat intolerance  Heme:  No petechiae, ecchymosis, easy bruisability  Skin:  No rash, tattoos, scars, edema      PHYSICAL EXAM:   Vital Signs:  Vital Signs Last 24 Hrs  T(C): 36.7 (27 Apr 2019 11:47), Max: 37.1 (26 Apr 2019 18:02)  T(F): 98 (27 Apr 2019 11:47), Max: 98.7 (26 Apr 2019 18:02)  HR: 73 (27 Apr 2019 11:47) (68 - 88)  BP: 128/69 (27 Apr 2019 11:47) (121/65 - 139/78)  BP(mean): --  RR: 18 (27 Apr 2019 11:47) (17 - 20)  SpO2: 97% (27 Apr 2019 11:47) (96% - 98%)  Daily     Daily     GENERAL:  Appears stated age, well-groomed, well-nourished, no distress  HEENT:  NC/AT,  ngt in place   CHEST:  Full & symmetric excursion, no increased effort, breath sounds clear  HEART:  Regular rhythm, S1, S2, no murmur/rub/S3/S4, no abdominal bruit, no edema  ABDOMEN:  Soft, mildly distended   EXTEREMITIES:  no cyanosis,clubbing or edema  SKIN:  No rash/erythema/ecchymoses/petechiae/wounds/abscess/warm/dry  NEURO:  Alert, oriented, no asterixis, no tremor, no encephalopathy      LABS:                        12.5   4.01  )-----------( 315      ( 27 Apr 2019 06:43 )             38.5     04-27    139  |  100  |  12  ----------------------------<  140<H>  3.1<L>   |  27  |  0.73    Ca    8.5      27 Apr 2019 06:43  Phos  1.9     04-27  Mg     1.7     04-27            RADIOLOGY & ADDITIONAL TESTS:
I spoke with Dr. 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 The possibility of a neoplasm still exists.  Plan: Begin johana  bowel prep tomorrow. Surgery Monday.
INTERVAL HPI/OVERNIGHT EVENTS:    (+) stool in ostomy  pain well controlled  no episodes of n/v    MEDICATIONS  (STANDING):  enoxaparin Injectable 40 milliGRAM(s) SubCutaneous daily  hydrochlorothiazide 12.5 milliGRAM(s) Oral daily  influenza   Vaccine 0.5 milliLiter(s) IntraMuscular once  insulin lispro (HumaLOG) corrective regimen sliding scale   SubCutaneous three times a day before meals  insulin lispro (HumaLOG) corrective regimen sliding scale   SubCutaneous at bedtime  lactated ringers. 1000 milliLiter(s) (30 mL/Hr) IV Continuous <Continuous>  lisinopril 10 milliGRAM(s) Oral daily  metoprolol tartrate 50 milliGRAM(s) Oral daily  simvastatin 20 milliGRAM(s) Oral at bedtime    MEDICATIONS  (PRN):  acetaminophen   Tablet .. 650 milliGRAM(s) Oral every 6 hours PRN Mild Pain (1 - 3)  oxyCODONE    IR 5 milliGRAM(s) Oral every 4 hours PRN Moderate Pain (4 - 6)  oxyCODONE    IR 10 milliGRAM(s) Oral every 4 hours PRN Severe Pain (7 - 10)      Allergies    No Known Allergies    Intolerances        Review of Systems:    General:  No wt loss, fevers, chills, night sweats, fatigue   Eyes:  Good vision, no reported pain  ENT:  No sore throat, pain, runny nose, dysphagia  CV:  No pain, palpitations, hypo/hypertension  Resp:  No dyspnea, cough, tachypnea, wheezing  GI:  No pain, No nausea, No vomiting, No diarrhea, No constipation, No weight loss, No fever, No pruritis, No rectal bleeding, No melena, No dysphagia  :  No pain, bleeding, incontinence, nocturia  Muscle:  No pain, weakness  Neuro:  No weakness, tingling, memory problems  Psych:  No fatigue, insomnia, mood problems, depression  Endocrine:  No polyuria, polydypsia, cold/heat intolerance  Heme:  No petechiae, ecchymosis, easy bruisability  Skin:  No rash, tattoos, scars, edema      Vital Signs Last 24 Hrs  T(C): 36.3 (03 May 2019 09:30), Max: 37.6 (03 May 2019 02:05)  T(F): 97.3 (03 May 2019 09:30), Max: 99.7 (03 May 2019 02:05)  HR: 88 (03 May 2019 09:30) (75 - 100)  BP: 113/50 (03 May 2019 09:30) (106/55 - 123/57)  BP(mean): --  RR: 20 (03 May 2019 09:30) (16 - 20)  SpO2: 97% (03 May 2019 09:30) (92% - 97%)    PHYSICAL EXAM:    Constitutional: NAD  HEENT: EOMI, throat clear  Neck: No LAD, supple  Respiratory: CTA and P  Cardiovascular: S1 and S2, RRR, no M  Gastrointestinal: BS+, soft, NT/ND, neg HSM, +ostomy with stool   Extremities: No peripheral edema, neg clubbing, cyanosis  Vascular: 2+ peripheral pulses  Neurological: A/O x 3, no focal deficits  Psychiatric: Normal mood, normal affect  Skin: No rashes      LABS:                        9.7    10.34 )-----------( 193      ( 03 May 2019 07:35 )             29.0     05-03    137  |  103  |  10  ----------------------------<  121<H>  4.0   |  22  |  0.60    Ca    7.5<L>      03 May 2019 07:35  Phos  2.4     05-03  Mg     2.1     05-03            RADIOLOGY & ADDITIONAL TESTS:
INTERVAL HPI/OVERNIGHT EVENTS:    family bedside   ostomy functioning well  no n/v  eating well     MEDICATIONS  (STANDING):  enoxaparin Injectable 40 milliGRAM(s) SubCutaneous daily  hydrochlorothiazide 12.5 milliGRAM(s) Oral daily  influenza   Vaccine 0.5 milliLiter(s) IntraMuscular once  insulin lispro (HumaLOG) corrective regimen sliding scale   SubCutaneous three times a day before meals  insulin lispro (HumaLOG) corrective regimen sliding scale   SubCutaneous at bedtime  lisinopril 10 milliGRAM(s) Oral daily  metoprolol tartrate 50 milliGRAM(s) Oral daily  simvastatin 20 milliGRAM(s) Oral at bedtime    MEDICATIONS  (PRN):  acetaminophen   Tablet .. 650 milliGRAM(s) Oral every 6 hours PRN Mild Pain (1 - 3)  oxyCODONE    IR 5 milliGRAM(s) Oral every 4 hours PRN Moderate Pain (4 - 6)  oxyCODONE    IR 10 milliGRAM(s) Oral every 4 hours PRN Severe Pain (7 - 10)      Allergies    No Known Allergies    Intolerances        Review of Systems:    General:  No wt loss, fevers, chills, night sweats, fatigue   Eyes:  Good vision, no reported pain  ENT:  No sore throat, pain, runny nose, dysphagia  CV:  No pain, palpitations, hypo/hypertension  Resp:  No dyspnea, cough, tachypnea, wheezing  GI:  No pain, No nausea, No vomiting, No diarrhea, No constipation, No weight loss, No fever, No pruritis, No rectal bleeding, No melena, No dysphagia  :  No pain, bleeding, incontinence, nocturia  Muscle:  No pain, weakness  Neuro:  No weakness, tingling, memory problems  Psych:  No fatigue, insomnia, mood problems, depression  Endocrine:  No polyuria, polydypsia, cold/heat intolerance  Heme:  No petechiae, ecchymosis, easy bruisability  Skin:  No rash, tattoos, scars, edema      Vital Signs Last 24 Hrs  T(C): 37.1 (04 May 2019 09:51), Max: 37.4 (03 May 2019 22:15)  T(F): 98.7 (04 May 2019 09:51), Max: 99.4 (03 May 2019 22:15)  HR: 88 (04 May 2019 09:51) (80 - 98)  BP: 130/60 (04 May 2019 09:51) (109/49 - 130/60)  BP(mean): --  RR: 18 (04 May 2019 09:51) (18 - 21)  SpO2: 97% (04 May 2019 09:51) (95% - 97%)    PHYSICAL EXAM:    Constitutional: NAD  HEENT: EOMI, throat clear  Neck: No LAD, supple  Respiratory: CTA and P  Cardiovascular: S1 and S2, RRR, no M  Gastrointestinal: BS+, soft, NT/ND, neg HSM, +ostomy with stool/gas  Extremities: No peripheral edema, neg clubbing, cyanosis  Vascular: 2+ peripheral pulses  Neurological: A/O x 3, no focal deficits  Psychiatric: Normal mood, normal affect  Skin: No rashes      LABS:                        9.7    10.34 )-----------( 193      ( 03 May 2019 07:35 )             29.0     05-03    137  |  103  |  10  ----------------------------<  121<H>  4.0   |  22  |  0.60    Ca    7.5<L>      03 May 2019 07:35  Phos  2.4     05-03  Mg     2.1     05-03            RADIOLOGY & ADDITIONAL TESTS:
INTERVAL HPI/OVERNIGHT EVENTS:    friend bedside this morning   no abd pain   ostomy with light brown soft stool   wound packed/covered     MEDICATIONS  (STANDING):  enoxaparin Injectable 40 milliGRAM(s) SubCutaneous daily  hydrochlorothiazide 12.5 milliGRAM(s) Oral daily  influenza   Vaccine 0.5 milliLiter(s) IntraMuscular once  insulin lispro (HumaLOG) corrective regimen sliding scale   SubCutaneous three times a day before meals  insulin lispro (HumaLOG) corrective regimen sliding scale   SubCutaneous at bedtime  lisinopril 10 milliGRAM(s) Oral daily  metoprolol tartrate 50 milliGRAM(s) Oral daily  simvastatin 20 milliGRAM(s) Oral at bedtime    MEDICATIONS  (PRN):  acetaminophen   Tablet .. 650 milliGRAM(s) Oral every 6 hours PRN Mild Pain (1 - 3)  benzocaine 15 mG/menthol 3.6 mG (Sugar-Free) Lozenge 1 Lozenge Oral every 2 hours PRN Sore Throat  oxyCODONE    IR 5 milliGRAM(s) Oral every 4 hours PRN Moderate Pain (4 - 6)  oxyCODONE    IR 10 milliGRAM(s) Oral every 4 hours PRN Severe Pain (7 - 10)      Allergies    No Known Allergies    Intolerances        Review of Systems:    General:  No wt loss, fevers, chills, night sweats, fatigue   Eyes:  Good vision, no reported pain  ENT:  No sore throat, pain, runny nose, dysphagia  CV:  No pain, palpitations, hypo/hypertension  Resp:  No dyspnea, cough, tachypnea, wheezing  GI:  No pain, No nausea, No vomiting, No diarrhea, No constipation, No weight loss, No fever, No pruritis, No rectal bleeding, No melena, No dysphagia  :  No pain, bleeding, incontinence, nocturia  Muscle:  No pain, weakness  Neuro:  No weakness, tingling, memory problems  Psych:  No fatigue, insomnia, mood problems, depression  Endocrine:  No polyuria, polydypsia, cold/heat intolerance  Heme:  No petechiae, ecchymosis, easy bruisability  Skin:  No rash, tattoos, scars, edema      Vital Signs Last 24 Hrs  T(C): 37.2 (06 May 2019 10:01), Max: 37.2 (05 May 2019 17:25)  T(F): 98.9 (06 May 2019 10:01), Max: 98.9 (05 May 2019 17:25)  HR: 91 (06 May 2019 10:01) (77 - 91)  BP: 108/52 (06 May 2019 10:01) (108/52 - 122/54)  BP(mean): --  RR: 17 (06 May 2019 10:01) (17 - 19)  SpO2: 94% (06 May 2019 10:01) (94% - 97%)    PHYSICAL EXAM:    Constitutional: NAD  HEENT: EOMI, throat clear  Neck: No LAD, supple  Respiratory: CTA and P  Cardiovascular: S1 and S2, RRR, no M  Gastrointestinal: BS+, soft, NT/ND, neg HSM, +ostomy   Extremities: No peripheral edema, neg clubbing, cyanosis  Vascular: 2+ peripheral pulses  Neurological: A/O x 3, no focal deficits  Psychiatric: Normal mood, normal affect  Skin: No rashes      LABS:                        8.5    8.48  )-----------( 169      ( 06 May 2019 05:30 )             25.3     05-06    137  |  102  |  6<L>  ----------------------------<  101<H>  3.1<L>   |  27  |  0.57    Ca    7.5<L>      06 May 2019 05:30  Phos  2.9     05-06  Mg     1.9     05-06            RADIOLOGY & ADDITIONAL TESTS:
INTERVAL HPI/OVERNIGHT EVENTS:    pain controlled; feeling well this morning   no n/v  ostomy functioning  tolerating po intake   anxious to be discharged    MEDICATIONS  (STANDING):  enoxaparin Injectable 40 milliGRAM(s) SubCutaneous daily  hydrochlorothiazide 12.5 milliGRAM(s) Oral daily  influenza   Vaccine 0.5 milliLiter(s) IntraMuscular once  insulin lispro (HumaLOG) corrective regimen sliding scale   SubCutaneous three times a day before meals  insulin lispro (HumaLOG) corrective regimen sliding scale   SubCutaneous at bedtime  lisinopril 10 milliGRAM(s) Oral daily  metoprolol tartrate 50 milliGRAM(s) Oral daily  simvastatin 20 milliGRAM(s) Oral at bedtime    MEDICATIONS  (PRN):  acetaminophen   Tablet .. 650 milliGRAM(s) Oral every 6 hours PRN Mild Pain (1 - 3)  benzocaine 15 mG/menthol 3.6 mG (Sugar-Free) Lozenge 1 Lozenge Oral every 2 hours PRN Sore Throat  oxyCODONE    IR 5 milliGRAM(s) Oral every 4 hours PRN Moderate Pain (4 - 6)  oxyCODONE    IR 10 milliGRAM(s) Oral every 4 hours PRN Severe Pain (7 - 10)      Allergies    No Known Allergies    Intolerances        Review of Systems:    General:  No wt loss, fevers, chills, night sweats, fatigue   Eyes:  Good vision, no reported pain  ENT:  No sore throat, pain, runny nose, dysphagia  CV:  No pain, palpitations, hypo/hypertension  Resp:  No dyspnea, cough, tachypnea, wheezing  GI:  No pain, No nausea, No vomiting, No diarrhea, No constipation, No weight loss, No fever, No pruritis, No rectal bleeding, No melena, No dysphagia  :  No pain, bleeding, incontinence, nocturia  Muscle:  No pain, weakness  Neuro:  No weakness, tingling, memory problems  Psych:  No fatigue, insomnia, mood problems, depression  Endocrine:  No polyuria, polydypsia, cold/heat intolerance  Heme:  No petechiae, ecchymosis, easy bruisability  Skin:  No rash, tattoos, scars, edema      Vital Signs Last 24 Hrs  T(C): 37.1 (05 May 2019 08:50), Max: 37.1 (04 May 2019 14:12)  T(F): 98.7 (05 May 2019 08:50), Max: 98.7 (04 May 2019 14:12)  HR: 88 (05 May 2019 08:50) (82 - 90)  BP: 114/90 (05 May 2019 08:50) (103/52 - 114/90)  BP(mean): --  RR: 18 (05 May 2019 08:50) (18 - 19)  SpO2: 98% (05 May 2019 08:50) (95% - 98%)    PHYSICAL EXAM:    Constitutional: NAD  HEENT: EOMI, throat clear  Neck: No LAD, supple  Respiratory: CTA and P  Cardiovascular: S1 and S2, RRR, no M  Gastrointestinal: BS+, soft, NT/ND, neg HSM, +ostomy  Extremities: No peripheral edema, neg clubbing, cyanosis  Vascular: 2+ peripheral pulses  Neurological: A/O x 3, no focal deficits  Psychiatric: Normal mood, normal affect  Skin: No rashes      LABS:                        9.0    8.41  )-----------( 169      ( 05 May 2019 07:08 )             26.1     05-05    137  |  99  |  9   ----------------------------<  118<H>  3.0<L>   |  28  |  0.59    Ca    7.7<L>      05 May 2019 07:09  Phos  2.4     05-05  Mg     2.0     05-05            RADIOLOGY & ADDITIONAL TESTS:
INTERVAL HPI/OVERNIGHT EVENTS:    pt reporting large episode of fecal emesis this morning  c/o abd distention and some pain llq    MEDICATIONS  (STANDING):  dextrose 5% + sodium chloride 0.9% 1000 milliLiter(s) (125 mL/Hr) IV Continuous <Continuous>  heparin  Injectable 5000 Unit(s) SubCutaneous every 8 hours  influenza   Vaccine 0.5 milliLiter(s) IntraMuscular once  insulin lispro (HumaLOG) corrective regimen sliding scale   SubCutaneous every 6 hours  ketorolac   Injectable 15 milliGRAM(s) IV Push once  metoprolol tartrate Injectable 5 milliGRAM(s) IV Push every 6 hours    MEDICATIONS  (PRN):  tetracaine/benzocaine/butamben Spray 1 Spray(s) Topical every 3 hours PRN Sore throat      Allergies    No Known Allergies    Intolerances        Review of Systems:    General:  No wt loss, fevers, chills, night sweats, fatigue   Eyes:  Good vision, no reported pain  ENT:  No sore throat, pain, runny nose, dysphagia  CV:  No pain, palpitations, hypo/hypertension  Resp:  No dyspnea, cough, tachypnea, wheezing  GI:  +pain, No nausea, + vomiting, No diarrhea, No constipation, No weight loss, No fever, No pruritis, No rectal bleeding, No melena, No dysphagia  :  No pain, bleeding, incontinence, nocturia  Muscle:  No pain, weakness  Neuro:  No weakness, tingling, memory problems  Psych:  No fatigue, insomnia, mood problems, depression  Endocrine:  No polyuria, polydypsia, cold/heat intolerance  Heme:  No petechiae, ecchymosis, easy bruisability  Skin:  No rash, tattoos, scars, edema      Vital Signs Last 24 Hrs  T(C): 36.7 (29 Apr 2019 11:22), Max: 37 (28 Apr 2019 12:25)  T(F): 98.1 (29 Apr 2019 11:22), Max: 98.6 (28 Apr 2019 12:25)  HR: 81 (29 Apr 2019 11:22) (75 - 91)  BP: 123/58 (29 Apr 2019 11:22) (112/62 - 140/77)  BP(mean): --  RR: 18 (29 Apr 2019 11:22) (15 - 18)  SpO2: 98% (29 Apr 2019 11:22) (94% - 99%)    PHYSICAL EXAM:    Constitutional: NAD  HEENT: EOMI, throat clear +ngt with brown output   Neck: No LAD, supple  Respiratory: CTA and P  Cardiovascular: S1 and S2, RRR, no M  Gastrointestinal: BS+, soft,+ttp llq and +abd distention neg HSM,  Extremities: No peripheral edema, neg clubbing, cyanosis  Vascular: 2+ peripheral pulses  Neurological: A/O x 3, no focal deficits  Psychiatric: Normal mood, normal affect  Skin: No rashes      LABS:                        13.0   5.55  )-----------( 284      ( 29 Apr 2019 05:20 )             40.9     04-29    144  |  106  |  18  ----------------------------<  136<H>  4.5   |  22  |  0.63    Ca    8.5      29 Apr 2019 05:20  Phos  2.4     04-29  Mg     1.5     04-29      PT/INR - ( 29 Apr 2019 05:20 )   PT: 13.2 SEC;   INR: 1.15          PTT - ( 29 Apr 2019 05:20 )  PTT:30.3 SEC      RADIOLOGY & ADDITIONAL TESTS:
INTERVAL HPI/OVERNIGHT EVENTS:    s/p sigmoid resection with colostomy  complain of post surgical pain    MEDICATIONS  (STANDING):  dextrose 5% + sodium chloride 0.9% 1000 milliLiter(s) (125 mL/Hr) IV Continuous <Continuous>  heparin  Injectable 5000 Unit(s) SubCutaneous every 8 hours  influenza   Vaccine 0.5 milliLiter(s) IntraMuscular once  insulin lispro (HumaLOG) corrective regimen sliding scale   SubCutaneous every 6 hours  ketorolac   Injectable 15 milliGRAM(s) IV Push once  metoprolol tartrate Injectable 5 milliGRAM(s) IV Push every 6 hours    MEDICATIONS  (PRN):  tetracaine/benzocaine/butamben Spray 1 Spray(s) Topical every 3 hours PRN Sore throat      Allergies    No Known Allergies    Intolerances        Review of Systems:    General:  No wt loss, fevers, chills, night sweats, fatigue   Eyes:  Good vision, no reported pain  ENT:  No sore throat, pain, runny nose, dysphagia  CV:  No pain, palpitations, hypo/hypertension  Resp:  No dyspnea, cough, tachypnea, wheezing  GI:  +pain, No nausea, + vomiting, No diarrhea, No constipation, No weight loss, No fever, No pruritis, No rectal bleeding, No melena, No dysphagia  :  No pain, bleeding, incontinence, nocturia  Muscle:  No pain, weakness  Neuro:  No weakness, tingling, memory problems  Psych:  No fatigue, insomnia, mood problems, depression  Endocrine:  No polyuria, polydypsia, cold/heat intolerance  Heme:  No petechiae, ecchymosis, easy bruisability  Skin:  No rash, tattoos, scars, edema      Vital Signs Last 24 Hrs  T(C): 36.7 (29 Apr 2019 11:22), Max: 37 (28 Apr 2019 12:25)  T(F): 98.1 (29 Apr 2019 11:22), Max: 98.6 (28 Apr 2019 12:25)  HR: 81 (29 Apr 2019 11:22) (75 - 91)  BP: 123/58 (29 Apr 2019 11:22) (112/62 - 140/77)  BP(mean): --  RR: 18 (29 Apr 2019 11:22) (15 - 18)  SpO2: 98% (29 Apr 2019 11:22) (94% - 99%)    PHYSICAL EXAM:    Constitutional: NAD  HEENT: EOMI, throat clear   Neck: No LAD, supple  Respiratory: CTA and P  Cardiovascular: S1 and S2, RRR, no M  Gastrointestinal: BS+, soft,+ttp llq +ostomy  Extremities: No peripheral edema, neg clubbing, cyanosis  Vascular: 2+ peripheral pulses  Neurological: A/O x 3, no focal deficits  Psychiatric: Normal mood, normal affect  Skin: No rashes      LABS:                        13.0   5.55  )-----------( 284      ( 29 Apr 2019 05:20 )             40.9     04-29    144  |  106  |  18  ----------------------------<  136<H>  4.5   |  22  |  0.63    Ca    8.5      29 Apr 2019 05:20  Phos  2.4     04-29  Mg     1.5     04-29      PT/INR - ( 29 Apr 2019 05:20 )   PT: 13.2 SEC;   INR: 1.15          PTT - ( 29 Apr 2019 05:20 )  PTT:30.3 SEC      RADIOLOGY & ADDITIONAL TESTS:
INTERVAL HPI/OVERNIGHT EVENTS:    s/p sigmoid resection with colostomy  complain of post surgical pain    MEDICATIONS  (STANDING):  dextrose 5% + sodium chloride 0.9% 1000 milliLiter(s) (125 mL/Hr) IV Continuous <Continuous>  heparin  Injectable 5000 Unit(s) SubCutaneous every 8 hours  influenza   Vaccine 0.5 milliLiter(s) IntraMuscular once  insulin lispro (HumaLOG) corrective regimen sliding scale   SubCutaneous every 6 hours  ketorolac   Injectable 15 milliGRAM(s) IV Push once  metoprolol tartrate Injectable 5 milliGRAM(s) IV Push every 6 hours    MEDICATIONS  (PRN):  tetracaine/benzocaine/butamben Spray 1 Spray(s) Topical every 3 hours PRN Sore throat      Allergies    No Known Allergies    Intolerances        Review of Systems:    General:  No wt loss, fevers, chills, night sweats, fatigue   Eyes:  Good vision, no reported pain  ENT:  No sore throat, pain, runny nose, dysphagia  CV:  No pain, palpitations, hypo/hypertension  Resp:  No dyspnea, cough, tachypnea, wheezing  GI:  +pain, No nausea, + vomiting, No diarrhea, No constipation, No weight loss, No fever, No pruritis, No rectal bleeding, No melena, No dysphagia  :  No pain, bleeding, incontinence, nocturia  Muscle:  No pain, weakness  Neuro:  No weakness, tingling, memory problems  Psych:  No fatigue, insomnia, mood problems, depression  Endocrine:  No polyuria, polydypsia, cold/heat intolerance  Heme:  No petechiae, ecchymosis, easy bruisability  Skin:  No rash, tattoos, scars, edema      Vital Signs Last 24 Hrs  T(C): 36.7 (29 Apr 2019 11:22), Max: 37 (28 Apr 2019 12:25)  T(F): 98.1 (29 Apr 2019 11:22), Max: 98.6 (28 Apr 2019 12:25)  HR: 81 (29 Apr 2019 11:22) (75 - 91)  BP: 123/58 (29 Apr 2019 11:22) (112/62 - 140/77)  BP(mean): --  RR: 18 (29 Apr 2019 11:22) (15 - 18)  SpO2: 98% (29 Apr 2019 11:22) (94% - 99%)    PHYSICAL EXAM:    Constitutional: NAD  HEENT: EOMI, throat clear +ngt with brown output   Neck: No LAD, supple  Respiratory: CTA and P  Cardiovascular: S1 and S2, RRR, no M  Gastrointestinal: BS+, soft,+ttp llq +ostomy  Extremities: No peripheral edema, neg clubbing, cyanosis  Vascular: 2+ peripheral pulses  Neurological: A/O x 3, no focal deficits  Psychiatric: Normal mood, normal affect  Skin: No rashes      LABS:                        13.0   5.55  )-----------( 284      ( 29 Apr 2019 05:20 )             40.9     04-29    144  |  106  |  18  ----------------------------<  136<H>  4.5   |  22  |  0.63    Ca    8.5      29 Apr 2019 05:20  Phos  2.4     04-29  Mg     1.5     04-29      PT/INR - ( 29 Apr 2019 05:20 )   PT: 13.2 SEC;   INR: 1.15          PTT - ( 29 Apr 2019 05:20 )  PTT:30.3 SEC      RADIOLOGY & ADDITIONAL TESTS:
INTERVAL HPI/OVERNIGHT EVENTS:    s/p sigmoid resection with colostomy  tolerating clears this am    MEDICATIONS  (STANDING):  dextrose 5% + sodium chloride 0.9% 1000 milliLiter(s) (125 mL/Hr) IV Continuous <Continuous>  heparin  Injectable 5000 Unit(s) SubCutaneous every 8 hours  influenza   Vaccine 0.5 milliLiter(s) IntraMuscular once  insulin lispro (HumaLOG) corrective regimen sliding scale   SubCutaneous every 6 hours  ketorolac   Injectable 15 milliGRAM(s) IV Push once  metoprolol tartrate Injectable 5 milliGRAM(s) IV Push every 6 hours    MEDICATIONS  (PRN):  tetracaine/benzocaine/butamben Spray 1 Spray(s) Topical every 3 hours PRN Sore throat      Allergies    No Known Allergies    Intolerances        Review of Systems:    General:  No wt loss, fevers, chills, night sweats, fatigue   Eyes:  Good vision, no reported pain  ENT:  No sore throat, pain, runny nose, dysphagia  CV:  No pain, palpitations, hypo/hypertension  Resp:  No dyspnea, cough, tachypnea, wheezing  GI:  +pain, No nausea, + vomiting, No diarrhea, No constipation, No weight loss, No fever, No pruritis, No rectal bleeding, No melena, No dysphagia  :  No pain, bleeding, incontinence, nocturia  Muscle:  No pain, weakness  Neuro:  No weakness, tingling, memory problems  Psych:  No fatigue, insomnia, mood problems, depression  Endocrine:  No polyuria, polydypsia, cold/heat intolerance  Heme:  No petechiae, ecchymosis, easy bruisability  Skin:  No rash, tattoos, scars, edema      Vital Signs Last 24 Hrs  T(C): 36.7 (29 Apr 2019 11:22), Max: 37 (28 Apr 2019 12:25)  T(F): 98.1 (29 Apr 2019 11:22), Max: 98.6 (28 Apr 2019 12:25)  HR: 81 (29 Apr 2019 11:22) (75 - 91)  BP: 123/58 (29 Apr 2019 11:22) (112/62 - 140/77)  BP(mean): --  RR: 18 (29 Apr 2019 11:22) (15 - 18)  SpO2: 98% (29 Apr 2019 11:22) (94% - 99%)    PHYSICAL EXAM:    Constitutional: NAD  HEENT: EOMI, throat clear   Neck: No LAD, supple  Respiratory: CTA and P  Cardiovascular: S1 and S2, RRR, no M  Gastrointestinal: BS+, soft,+ttp llq +ostomy  Extremities: No peripheral edema, neg clubbing, cyanosis  Vascular: 2+ peripheral pulses  Neurological: A/O x 3, no focal deficits  Psychiatric: Normal mood, normal affect  Skin: No rashes      LABS:                        13.0   5.55  )-----------( 284      ( 29 Apr 2019 05:20 )             40.9     04-29    144  |  106  |  18  ----------------------------<  136<H>  4.5   |  22  |  0.63    Ca    8.5      29 Apr 2019 05:20  Phos  2.4     04-29  Mg     1.5     04-29      PT/INR - ( 29 Apr 2019 05:20 )   PT: 13.2 SEC;   INR: 1.15          PTT - ( 29 Apr 2019 05:20 )  PTT:30.3 SEC      RADIOLOGY & ADDITIONAL TESTS:
Morning Surgical Progress Note  Patient is a 78y old  Female who presents with a chief complaint of Partial large bowel obstruction (26 Apr 2019 12:16)      SUBJECTIVE:  Pain controlled. Planning for OR Monday 4/29    Vital Signs (24 Hrs):  T(C): 37 (04-28-19 @ 09:32), Max: 37.1 (04-27-19 @ 17:18)  HR: 88 (04-28-19 @ 09:32) (72 - 88)  BP: 120/59 (04-28-19 @ 09:32) (120/59 - 142/84)  RR: 18 (04-28-19 @ 09:32) (17 - 18)  SpO2: 98% (04-28-19 @ 09:32) (96% - 98%)  Wt(kg): --  Daily     Daily     I&O's Summary    27 Apr 2019 07:01  -  28 Apr 2019 07:00  --------------------------------------------------------  IN: 3300 mL / OUT: 2300 mL / NET: 1000 mL    Physical Exam  Constitutional: A&Ox3, NAD  Gastrointestinal: minimally distended soft and nontender    LABS:                          12.8   3.08  )-----------( 317      ( 28 Apr 2019 07:05 )             39.2       04-28    143  |  106  |  14  ----------------------------<  127<H>  4.0   |  23  |  0.68    Ca    8.3<L>      28 Apr 2019 07:05  Phos  2.0     04-28  Mg     1.6     04-28
Morning Surgical Progress Note  Patient is a 78y old  Female who presents with a chief complaint of Partial large bowel obstruction (26 Apr 2019 12:16)      SUBJECTIVE: NG tube advanced O/N. Pain controlled. Planning for OR Monday 4/29    Vital Signs (24 Hrs):  T(C): 37.1 (04-27-19 @ 09:28), Max: 37.1 (04-26-19 @ 18:02)  HR: 88 (04-27-19 @ 09:28) (68 - 88)  BP: 137/82 (04-27-19 @ 09:28) (121/65 - 139/78)  RR: 18 (04-27-19 @ 09:28) (17 - 20)  SpO2: 98% (04-27-19 @ 09:28) (96% - 99%)  Wt(kg): --  Daily     Daily     I&O's Summary    26 Apr 2019 07:01  -  27 Apr 2019 07:00  --------------------------------------------------------  IN: 2800 mL / OUT: 1075 mL / NET: 1725 mL      Physical Exam  Constitutional: A&Ox3, NAD  Gastrointestinal: minimally distended soft and nontender    LABS:                                 12.5   4.01  )-----------( 315      ( 27 Apr 2019 06:43 )             38.5       04-27    139  |  100  |  12  ----------------------------<  140<H>  3.1<L>   |  27  |  0.73    Ca    8.5      27 Apr 2019 06:43  Phos  1.9     04-27  Mg     1.7     04-27
Morning Surgical Progress Note  Patient is a 78y old  Female who presents with a chief complaint of Partial large bowel obstruction (26 Apr 2019 12:16)      SUBJECTIVE: Patient seen and examined at bedside wshe is s/p colonoscopy revealing sigmoid stricture, she denies abdominal pain nausea and vomiting she is not passing flatus    Vital Signs Last 24 Hrs  T(C): 36.3 (26 Apr 2019 15:05), Max: 37.1 (26 Apr 2019 01:45)  T(F): 97.4 (26 Apr 2019 15:05), Max: 98.7 (26 Apr 2019 01:45)  HR: 86 (26 Apr 2019 15:05) (74 - 87)  BP: 132/90 (26 Apr 2019 15:05) (112/55 - 134/63)  BP(mean): --  RR: 18 (26 Apr 2019 15:05) (16 - 18)  SpO2: 99% (26 Apr 2019 15:05) (94% - 99%)I&O's Detail    25 Apr 2019 07:01  -  26 Apr 2019 07:00  --------------------------------------------------------  IN:    IV PiggyBack: 300 mL    lactated ringers.: 3000 mL  Total IN: 3300 mL    OUT:    Nasoenteral Tube: 400 mL    Voided: 350 mL  Total OUT: 750 mL    Total NET: 2550 mL      MEDICATIONS  (STANDING):  dextrose 5% + sodium chloride 0.9% 1000 milliLiter(s) (125 mL/Hr) IV Continuous <Continuous>  dextrose 5%. 1000 milliLiter(s) (50 mL/Hr) IV Continuous <Continuous>  dextrose 50% Injectable 12.5 Gram(s) IV Push once  dextrose 50% Injectable 25 Gram(s) IV Push once  dextrose 50% Injectable 25 Gram(s) IV Push once  heparin  Injectable 5000 Unit(s) SubCutaneous every 8 hours  influenza   Vaccine 0.5 milliLiter(s) IntraMuscular once  insulin lispro (HumaLOG) corrective regimen sliding scale   SubCutaneous every 6 hours  metoprolol tartrate Injectable 5 milliGRAM(s) IV Push every 6 hours  piperacillin/tazobactam IVPB. 3.375 Gram(s) IV Intermittent every 8 hours  potassium phosphate IVPB 15 milliMole(s) IV Intermittent once    MEDICATIONS  (PRN):  dextrose 40% Gel 15 Gram(s) Oral once PRN Blood Glucose LESS THAN 70 milliGRAM(s)/deciliter  glucagon  Injectable 1 milliGRAM(s) IntraMuscular once PRN Glucose LESS THAN 70 milligrams/deciliter      Physical Exam  Constitutional: A&Ox3, NAD  Gastrointestinal: minimally distended soft and nontender    LABS:                        11.9   3.03  )-----------( 288      ( 26 Apr 2019 05:55 )             35.6     04-26    x   |  x   |  x   ----------------------------<  x   3.1<L>   |  x   |  x     Ca    8.4      26 Apr 2019 05:55  Phos  2.2     04-26  Mg     1.8     04-26
Post-operative Check    SUBJECTIVE: No acute events in the immediate post-operative period.  Patient endorses mild incisional pain, controlled with PCA.  She is still somewhat lethargic from anesthesia.  Denies any nausea/vomiting/sob/cp.    Colostomy output was initially high; however, has since slowed down and not leaking.     OBJECTIVE:  T(C): 36.8 (04-29-19 @ 20:15), Max: 36.8 (04-29-19 @ 20:15)  HR: 103 (04-29-19 @ 23:00) (75 - 103)  BP: 119/70 (04-29-19 @ 23:00) (103/49 - 136/60)  RR: 15 (04-29-19 @ 23:00) (15 - 22)  SpO2: 96% (04-29-19 @ 23:00) (92% - 99%)      04-28-19 @ 07:01  -  04-29-19 @ 07:00  --------------------------------------------------------  IN: 3050 mL / OUT: 2550 mL / NET: 500 mL    04-29-19 @ 07:01  -  04-30-19 @ 00:09  --------------------------------------------------------  IN: 675 mL / OUT: 510 mL / NET: 165 mL        Physical Exam:   - Constitutional: AOx3, NAD, NGT to LCWS  - CV: normotensive, regular rate   - Respiratory: nonlabored, nasal cannula intact   - Abdomen: soft, dressing mildly saturated, colostomy with liquid brown output to bag drainage.        ASSESSMENT:   IVAN WHALEN is a 78y Female POD#0 from [place] for distal stricture.      PLAN:  - Monitor ostomy output, check GIF  - Pain management, PCA/IV Tyl/Toradol  - NPO/NGT to LCWS  - LR @ 125ml/hr  - Follow UOP  - DVT ppx    - Appropriate for transfer to the floor
Post-operative Check    SUBJECTIVE: No acute events. Colostomy leaking adjacent to wound.     OBJECTIVE:  Vital Signs (24 Hrs):  T(C): 36.9 (19 @ 06:37), Max: 37.2 (19 @ 12:54)  HR: 97 (19 @ 06:37) (97 - 110)  BP: 112/58 (19 @ 06:37) (91/49 - 112/58)  RR: 17 (19 @ 06:37) (16 - 18)  SpO2: 94% (19 @ 06:37) (94% - 98%)  Wt(kg): --  Daily     Daily Weight in k.2 (01 May 2019 01:37)    I&O's Summary    2019 07:01  -  01 May 2019 07:00  --------------------------------------------------------  IN: 1500 mL / OUT: 980 mL / NET: 520 mL      Physical Exam:   - Constitutional: AOx3, NAD, NGT to LCWS  - CV: normotensive, regular rate   - Respiratory: nonlabored, nasal cannula intact   - Abdomen: soft, dressing mildly saturated, colostomy with liquid brown output to bag drainage.                            10.2   13.26 )-----------( 218      ( 01 May 2019 05:30 )             32.6           140  |  105  |  16  ----------------------------<  179<H>  3.6   |  22  |  0.77    Ca    7.7<L>      01 May 2019 05:30  Phos  2.3       Mg     1.8                     ( 2019 16:06 ) pH: 7.38  /  pCO2: 38    /  pO2: 341   / HCO3: 23    / Base Excess: -2.4  /  SaO2: 99.7            ( 2019 14:10 ) pH: 7.41  /  pCO2: 36    /  pO2: 456   / HCO3: 23    / Base Excess: -1.8  /  SaO2: 99.6
SURGERY Progress Note  IVAN WHALEN    S: Patient seen at bedside, no issues overnight.  Endorses mild abdominal pain, NGT running.  Having flatus and bowel movements.  Denies n/v.     O:  T(C): 36.4 (04-25-19 @ 10:16), Max: 37.1 (04-25-19 @ 05:06)  HR: 77 (04-25-19 @ 10:16) (70 - 125)  BP: 121/57 (04-25-19 @ 10:16) (115/62 - 144/57)  RR: 18 (04-25-19 @ 10:16) (17 - 20)  SpO2: 94% (04-25-19 @ 10:16) (94% - 100%)          Physical Exam:  Gen: NAD, NGT intact w/ adequate suction, flushed  Resp: No acute respiratory distress, normal effort  CV: Normal rate, regular rhythm  Abd: Soft, mild pain, min. distention     Labs:  CBC (04-25 @ 07:25)                              12.4                           3.62<L>  )----------------(  311        --    % Neutrophils, --    % Lymphocytes, ANC: --                                  37.3                CBC (04-24 @ 18:58)                              13.5                           3.41<L>  )----------------(  353        52.5  % Neutrophils, 29.3  % Lymphocytes, ANC: 1.79<L>                              40.6                  BMP (04-25 @ 07:25)             136     |  92<L>   |  26<H> 		Ca++ --      Ca 8.8                ---------------------------------( 136<H>		Mg 2.0                3.7     |  33<H>   |  0.82  			Ph --      BMP (04-24 @ 11:40)             132<L>  |  86<L>   |  37<H> 		Ca++ --      Ca 9.5                ---------------------------------( 183<H>		Mg --                 3.7     |  27      |  0.90  			Ph --        LFTs (04-24 @ 11:40)      TPro 7.2 / Alb 4.0 / TBili 0.5 / DBili -- / AST 14 / ALT 11 / AlkPhos 59    Coags (04-24 @ 12:02)  aPTT 26.3<L> / INR 1.03 / PT 11.8    ABG (04-24 @ 16:10)      /  /  /  /  / %     Lactate:   1.4  ABG (04-24 @ 11:40)      /  /  /  /  / %     Lactate:   2.9<H>    VBG (04-24 @ 16:10)     7.40 / 49 / 48<H> / 28<H> / 5.4 / 79.4%  VBG (04-24 @ 11:40)     7.47<H> / 46 / < 24<L> / 30<H> / 8.6 / 35.4<L>%    -> URINE MIDSTREAM Culture (04-24 @ 13:59)     NG    NG  NG
Surgery Progress Note    SUBJECTIVE: Pt seen and examined at bedside. Patient comfortable and in no-apparent distress.     Vital Signs Last 24 Hrs  T(C): 37.2 (06 May 2019 10:01), Max: 37.2 (05 May 2019 17:25)  T(F): 98.9 (06 May 2019 10:01), Max: 98.9 (05 May 2019 17:25)  HR: 91 (06 May 2019 10:01) (77 - 91)  BP: 108/52 (06 May 2019 10:01) (108/52 - 122/54)  BP(mean): --  RR: 17 (06 May 2019 10:01) (17 - 19)  SpO2: 94% (06 May 2019 10:01) (94% - 97%)    Physical Exam:  General Appearance: in NAD  Respiratory: No labored breathing  CV: Pulse regularly present  Abdomen: Soft, nontender, nondistended. Midline wound with superior aspect with packing and inferior aspect open and packed with wet to dry gauze. No purulent drainage or malodor. Ostomy with gas and stool within bag.     LABS:                        8.5    8.48  )-----------( 169      ( 06 May 2019 05:30 )             25.3     05-06    137  |  102  |  6<L>  ----------------------------<  101<H>  3.1<L>   |  27  |  0.57    Ca    7.5<L>      06 May 2019 05:30  Phos  2.9     05-06  Mg     1.9     05-06            INs and OUTs:    05-05-19 @ 07:01  -  05-06-19 @ 07:00  --------------------------------------------------------  IN: 250 mL / OUT: 1550 mL / NET: -1300 mL
[facility]  Alonzo Menendez PA-C  [address] [place] [address]  [phone]      INTERVAL HPI/OVERNIGHT EVENTS:    s/p sigmoidoscopy   unable to traverse lesion  ngt in place    MEDICATIONS  (STANDING):  dextrose 5% + sodium chloride 0.9% 1000 milliLiter(s) (125 mL/Hr) IV Continuous <Continuous>  heparin  Injectable 5000 Unit(s) SubCutaneous every 8 hours  influenza   Vaccine 0.5 milliLiter(s) IntraMuscular once  insulin lispro (HumaLOG) corrective regimen sliding scale   SubCutaneous every 6 hours  metoprolol tartrate Injectable 5 milliGRAM(s) IV Push every 6 hours  piperacillin/tazobactam IVPB. 3.375 Gram(s) IV Intermittent every 8 hours    MEDICATIONS  (PRN):  tetracaine/benzocaine/butamben Spray 1 Spray(s) Topical every 3 hours PRN Sore throat      Allergies    No Known Allergies    Intolerances        ROS:   General:  No wt loss, fevers, chills, night sweats, fatigue,   Eyes:  Good vision, no reported pain  ENT:  No sore throat, pain, runny nose, dysphagia  CV:  No pain, palpitations, hypo/hypertension  Resp:  No dyspnea, cough, tachypnea, wheezing  GI:  + pain, No nausea, No vomiting, No diarrhea, No constipation, No weight loss, No fever, No pruritis, No rectal bleeding, No tarry stools, No dysphagia,  :  No pain, bleeding, incontinence, nocturia  Muscle:  No pain, weakness  Neuro:  No weakness, tingling, memory problems  Psych:  No fatigue, insomnia, mood problems, depression  Endocrine:  No polyuria, polydipsia, cold/heat intolerance  Heme:  No petechiae, ecchymosis, easy bruisability  Skin:  No rash, tattoos, scars, edema      PHYSICAL EXAM:   Vital Signs:  Vital Signs Last 24 Hrs  T(C): 36.7 (27 Apr 2019 11:47), Max: 37.1 (26 Apr 2019 18:02)  T(F): 98 (27 Apr 2019 11:47), Max: 98.7 (26 Apr 2019 18:02)  HR: 73 (27 Apr 2019 11:47) (68 - 88)  BP: 128/69 (27 Apr 2019 11:47) (121/65 - 139/78)  BP(mean): --  RR: 18 (27 Apr 2019 11:47) (17 - 20)  SpO2: 97% (27 Apr 2019 11:47) (96% - 98%)  Daily     Daily     GENERAL:  Appears stated age, well-groomed, well-nourished, no distress  HEENT:  NC/AT,  ngt in place   CHEST:  Full & symmetric excursion, no increased effort, breath sounds clear  HEART:  Regular rhythm, S1, S2, no murmur/rub/S3/S4, no abdominal bruit, no edema  ABDOMEN:  Soft, mildly distended   EXTEREMITIES:  no cyanosis,clubbing or edema  SKIN:  No rash/erythema/ecchymoses/petechiae/wounds/abscess/warm/dry  NEURO:  Alert, oriented, no asterixis, no tremor, no encephalopathy      LABS:                        12.5   4.01  )-----------( 315      ( 27 Apr 2019 06:43 )             38.5     04-27    139  |  100  |  12  ----------------------------<  140<H>  3.1<L>   |  27  |  0.73    Ca    8.5      27 Apr 2019 06:43  Phos  1.9     04-27  Mg     1.7     04-27            RADIOLOGY & ADDITIONAL TESTS:
SURGERY PROGRESS NOTE  B-Team    SUBJECTIVE: No acute events overnight.  +Gas/stool in ostomy bag.  She endorses mild pain, denies n/v.  Dressing changed at bedside with packing.      Vital Signs Last 24 Hrs  T(C): 36.7 (02 May 2019 10:45), Max: 36.8 (02 May 2019 02:00)  T(F): 98 (02 May 2019 10:45), Max: 98.2 (02 May 2019 02:00)  HR: 81 (02 May 2019 10:45) (81 - 100)  BP: 103/55 (02 May 2019 10:45) (97/52 - 122/68)  BP(mean): --  RR: 17 (02 May 2019 10:45) (16 - 17)  SpO2: 98% (02 May 2019 10:45) (94% - 98%)    05-01-19 @ 07:01  -  05-02-19 @ 07:00  --------------------------------------------------------  IN: 1100 mL / OUT: 1330 mL / NET: -230 mL    05-02-19 @ 07:01  -  05-02-19 @ 11:50  --------------------------------------------------------  IN: 0 mL / OUT: 200 mL / NET: -200 mL      OBJECTIVE:      Physical Exam:   - Constitutional: AOx3, NAD  - CV: normotensive, regular rate   - Respiratory: nonlabored, nasal cannula intact   - Abdomen: soft, dressing with mild strikethrough, colostomy with +Gas, liquid brown output to bag drainage.        Labs:      CBC (05-02 @ 06:40)                              10.2<L>                         12.33<H>  )----------------(  184        --    % Neutrophils, --    % Lymphocytes, ANC: --                                  32.1<L>              CBC (05-01 @ 05:30)                              10.2<L>                         13.26<H>  )----------------(  218        --    % Neutrophils, --    % Lymphocytes, ANC: --                                  32.6<L>                BMP (05-02 @ 06:40)             135     |  101     |  14    		Ca++ --      Ca 7.6<L>             ---------------------------------( 153<H>		Mg 1.7                3.3<L>  |  22      |  0.72  			Ph 1.6<L>  BMP (05-01 @ 05:30)             140     |  105     |  16    		Ca++ --      Ca 7.7<L>             ---------------------------------( 179<H>		Mg 1.8                3.6     |  22      |  0.77  			Ph 2.3<L>            -> URINE BLADDER Culture (04-29 @ 14:35)     NG    NG  NG    -> BLOOD PERIPHERAL Culture (04-24 @ 14:31)     NG    NG  NG    -> URINE MIDSTREAM Culture (04-24 @ 13:59)     NG    Escherichia coli  NG

## 2019-05-06 NOTE — PROGRESS NOTE ADULT - PROBLEM SELECTOR PLAN 1
- Advance diet as tolerated   - pain control  - monitor gi function  - Await surgical pathology
- clears as tolerated  - pain control  - iv fluid  - monitor gi function  - Await surgical pathology
- monitor ngt ? dc soon  - npo  - pain control  - iv fluid  - monitor gi function  - Await surgical pathology
- s/p Jose resection  - pain control  - monitor gi function  - surgical pathology testing  - diet as tolerated
- s/p Jose resection  - pain control  - monitor gi function; continues to function well  - surgical pathology testing  - diet as tolerated
- s/p Jose resection  - pain control  - monitor gi function; continues to function well  - surgical pathology testing  - diet as tolerated  - dc planning in progress per surgery
- s/p Jose resection  - pain control  - monitor gi function; continues to function well  - surgical pathology testing  - diet as tolerated  - dc planning in progress per surgery
- s/p sigmoidoscopy with ?diverticular stricture  - IVF  - NPO with NGT lws   - monitor ngt output  - zofran prn   - pain control prn   - plan for surgery today
s/p sigmoidoscopy  ? diverticular stricture  npo  monitor ngt output  plan for surgery monday
s/p sigmoidoscopy  ? diverticular stricture  npo  monitor ngt output  plan for surgery monday

## 2019-05-06 NOTE — PROGRESS NOTE ADULT - PROVIDER SPECIALTY LIST ADULT
Anesthesia
Gastroenterology
Pain Medicine
Surgery
Gastroenterology
Surgery
Pain Medicine

## 2019-05-06 NOTE — DISCHARGE NOTE NURSING/CASE MANAGEMENT/SOCIAL WORK - NSDCDPATPORTLINK_GEN_ALL_CORE
You can access the [facility] Patient Portal, offered by [facility], by registering with the following website: [email or web address]

## 2019-05-06 NOTE — DISCHARGE NOTE NURSING/CASE MANAGEMENT/SOCIAL WORK - NSDCPNINST_GEN_ALL_CORE
Call MD for f/u appointment, call for fever, severe pain, empty ostomy 3x/day,  change ostomy appliance twice a week or as needed

## 2019-05-06 NOTE — DISCHARGE NOTE NURSING/CASE MANAGEMENT/SOCIAL WORK - NSDCPEEMAIL_GEN_ALL_CORE
[facility] for Tobacco Control email [email or web address]

## 2019-05-06 NOTE — PROGRESS NOTE ADULT - REASON FOR ADMISSION
Partial large bowel obstruction

## 2019-05-06 NOTE — PROGRESS NOTE ADULT - PROBLEM SELECTOR PROBLEM 1
Bowel obstruction

## 2019-05-06 NOTE — DISCHARGE NOTE NURSING/CASE MANAGEMENT/SOCIAL WORK - NSDCPEWEB_GEN_ALL_CORE
[place] website --- [email or web address] for Tobacco Control website --- [email or web address]

## 2019-05-06 NOTE — PROGRESS NOTE ADULT - ASSESSMENT
ASSESSMENT:   IVAN WHALEN is a 78y Female s/p [place] for distal stricture.      PLAN:  - Monitor ostomy output, check for GIF  - Ostomy RN consult for persistent applicance leaking  - Pain management, PCA/IV Tyl/Toradol  - Advanced to regular diet, check PO tolerance  - Follow UOP, passed TOV  - DVT ppx    B TEAM   [id number]
77 yo woman with sigmoid stricture s/p Jose resection, frozen pathology consistent with adenocarcinoma     - d/c PCA, convert to PO pain medications  - Ostomy RN consultation  - DAWN planning     Discussed above with surgical Attending, Dr. Andreina Peña MD PGY3  [id number]
77 yo woman with sigmoid stricture s/p Jose resection, frozen pathology consistent with adenocarcinoma     - d/c PCA, convert to PO pain medications  - Ostomy RN consultation  - DAWN planning, likely discharge today    B TEAM  [id number]
78y female with partially obstructing lesion of the sigmoid colon - mass vs. diverticular stricture. Abdomen is benign    - Cardiac clearance needed prior to OR Monday  - Clamp NG Tube   - Serial abdominal examinations  - Go bowel starting today  - OR Monday 4/29 sigmoid colectomy    B TEAM  [id number]
78y female with partially obstructing lesion of the sigmoid colon - mass vs. diverticular stricture. Abdomen is benign    - Cardiac risk stratification needed prior to OR Monday  - Clamp NG Tube   - Serial abdominal examinations  - Go bowel starting today  - OR Monday 4/29 sigmoid colectomy    B TEAM  [id number]
78y female with partially obstructing lesion of the sigmoid colon - mass vs. diverticular stricture. Abdomen is benign  - D/c NGTube and advance to clears  - Serial abdominal examinations  - Go bowel prep tomorrow for OR Monday  - OR Monday sigmoid colectomy  - D/w B team
78y female with partially obstructing lesion of the sigmoid colon - mass vs. diverticular stricture. Abdomen is benign, patient is non-toxic.  - NPO/IVF  - NGT to LCWS, flush PRN  - Serial abdominal examinations  - F/U GI recommendations, plan for flex sigmoidoscopy, possibly 4/26
79 yo woman with sigmoid stricture s/p Jose resection, frozen pathology consistent with adenocarcinoma     - PO Pain control  - Daily dressing changes  - Ostomy RN consultation  - DAWN planning, accepted    B TEAM  [id number]
79 yo woman with sigmoid stricture s/p Jose resection, frozen pathology consistent with adenocarcinoma.    - PO Pain control.  - Daily dressing changes.  - Ostomy RN consultation.  - DAWN planning, accepted. Planning for rehab today if bed available / everything set up - will work with MICHOACANO LAYTON TEAM  [id number]
ASSESSMENT:   IVAN WHALEN is a 78y Female s/p [place] for distal stricture.      PLAN:  - Monitor ostomy output, check GIF  - Pain management, PCA/IV Tyl/Toradol  - Remove NG tube -> CLD today  - LR @ 125ml/hr  - Follow UOP  - DVT ppx    B TEAM   [id number]

## 2019-05-10 LAB — SURGICAL PATHOLOGY STUDY: SIGNIFICANT CHANGE UP

## 2019-05-29 LAB — FUNGUS SPEC QL CULT: SIGNIFICANT CHANGE UP

## 2019-06-25 PROBLEM — Z00.00 ENCOUNTER FOR PREVENTIVE HEALTH EXAMINATION: Status: ACTIVE | Noted: 2019-06-25

## 2019-06-27 ENCOUNTER — OUTPATIENT (OUTPATIENT)
Dept: OUTPATIENT SERVICES | Facility: HOSPITAL | Age: 79
LOS: 1 days | Discharge: HOME | End: 2019-06-27

## 2019-06-27 DIAGNOSIS — C18.9 MALIGNANT NEOPLASM OF COLON, UNSPECIFIED: ICD-10-CM

## 2019-07-05 ENCOUNTER — RESULT REVIEW (OUTPATIENT)
Age: 79
End: 2019-07-05

## 2019-07-05 ENCOUNTER — APPOINTMENT (OUTPATIENT)
Dept: HEMATOLOGY ONCOLOGY | Facility: CLINIC | Age: 79
End: 2019-07-05
Payer: MEDICARE

## 2019-07-05 VITALS
RESPIRATION RATE: 16 BRPM | HEART RATE: 82 BPM | SYSTOLIC BLOOD PRESSURE: 135 MMHG | BODY MASS INDEX: 24.53 KG/M2 | WEIGHT: 138.45 LBS | DIASTOLIC BLOOD PRESSURE: 70 MMHG | HEIGHT: 62.8 IN | OXYGEN SATURATION: 98 %

## 2019-07-05 DIAGNOSIS — Z86.79 PERSONAL HISTORY OF OTHER DISEASES OF THE CIRCULATORY SYSTEM: ICD-10-CM

## 2019-07-05 DIAGNOSIS — Z80.9 FAMILY HISTORY OF MALIGNANT NEOPLASM, UNSPECIFIED: ICD-10-CM

## 2019-07-05 DIAGNOSIS — Z86.39 PERSONAL HISTORY OF OTHER ENDOCRINE, NUTRITIONAL AND METABOLIC DISEASE: ICD-10-CM

## 2019-07-05 DIAGNOSIS — Z87.891 PERSONAL HISTORY OF NICOTINE DEPENDENCE: ICD-10-CM

## 2019-07-05 DIAGNOSIS — E78.5 HYPERLIPIDEMIA, UNSPECIFIED: ICD-10-CM

## 2019-07-05 LAB
BASOPHILS # BLD AUTO: 0.1 K/UL — SIGNIFICANT CHANGE UP (ref 0–0.2)
BASOPHILS NFR BLD AUTO: 0.8 % — SIGNIFICANT CHANGE UP (ref 0–2)
EOSINOPHIL # BLD AUTO: 0.2 K/UL — SIGNIFICANT CHANGE UP (ref 0–0.5)
EOSINOPHIL NFR BLD AUTO: 2 % — SIGNIFICANT CHANGE UP (ref 0–6)
HCT VFR BLD CALC: 36.9 % — SIGNIFICANT CHANGE UP (ref 34.5–45)
HGB BLD-MCNC: 12.1 G/DL — SIGNIFICANT CHANGE UP (ref 11.5–15.5)
LYMPHOCYTES # BLD AUTO: 2.6 K/UL — SIGNIFICANT CHANGE UP (ref 1–3.3)
LYMPHOCYTES # BLD AUTO: 33.7 % — SIGNIFICANT CHANGE UP (ref 13–44)
MCHC RBC-ENTMCNC: 32.1 PG — SIGNIFICANT CHANGE UP (ref 27–34)
MCHC RBC-ENTMCNC: 32.8 G/DL — SIGNIFICANT CHANGE UP (ref 32–36)
MCV RBC AUTO: 97.9 FL — SIGNIFICANT CHANGE UP (ref 80–100)
MONOCYTES # BLD AUTO: 0.6 K/UL — SIGNIFICANT CHANGE UP (ref 0–0.9)
MONOCYTES NFR BLD AUTO: 7.2 % — SIGNIFICANT CHANGE UP (ref 2–14)
NEUTROPHILS # BLD AUTO: 4.4 K/UL — SIGNIFICANT CHANGE UP (ref 1.8–7.4)
NEUTROPHILS NFR BLD AUTO: 56.2 % — SIGNIFICANT CHANGE UP (ref 43–77)
PLATELET # BLD AUTO: 370 K/UL — SIGNIFICANT CHANGE UP (ref 150–400)
RBC # BLD: 3.77 M/UL — LOW (ref 3.8–5.2)
RBC # FLD: 13.2 % — SIGNIFICANT CHANGE UP (ref 10.3–14.5)
WBC # BLD: 7.8 K/UL — SIGNIFICANT CHANGE UP (ref 3.8–10.5)
WBC # FLD AUTO: 7.8 K/UL — SIGNIFICANT CHANGE UP (ref 3.8–10.5)

## 2019-07-05 PROCEDURE — 99205 OFFICE O/P NEW HI 60 MIN: CPT

## 2019-07-05 RX ORDER — EZETIMIBE AND SIMVASTATIN 10; 20 MG/1; MG/1
10-20 TABLET ORAL
Qty: 30 | Refills: 0 | Status: DISCONTINUED | COMMUNITY
Start: 2019-02-14

## 2019-07-05 RX ORDER — QUINAPRIL HYDROCHLORIDE 10 MG/1
10 TABLET, FILM COATED ORAL
Qty: 30 | Refills: 0 | Status: DISCONTINUED | COMMUNITY
Start: 2019-06-12

## 2019-07-05 RX ORDER — METOPROLOL SUCCINATE 50 MG/1
50 TABLET, EXTENDED RELEASE ORAL
Qty: 30 | Refills: 0 | Status: DISCONTINUED | COMMUNITY
Start: 2019-06-12

## 2019-07-05 RX ORDER — METOCLOPRAMIDE 10 MG/1
10 TABLET ORAL
Qty: 60 | Refills: 0 | Status: DISCONTINUED | COMMUNITY
Start: 2019-06-12

## 2019-07-05 RX ORDER — POTASSIUM CHLORIDE 1500 MG/1
20 TABLET, EXTENDED RELEASE ORAL
Qty: 30 | Refills: 0 | Status: DISCONTINUED | COMMUNITY
Start: 2019-06-12

## 2019-07-05 RX ORDER — METOPROLOL TARTRATE 50 MG/1
50 TABLET, FILM COATED ORAL
Qty: 30 | Refills: 0 | Status: DISCONTINUED | COMMUNITY
Start: 2019-02-14

## 2019-07-05 RX ORDER — METFORMIN HYDROCHLORIDE 500 MG/1
500 TABLET, COATED ORAL
Qty: 60 | Refills: 0 | Status: DISCONTINUED | COMMUNITY
Start: 2019-02-14

## 2019-07-05 RX ORDER — HYDROCHLOROTHIAZIDE 12.5 MG/1
12.5 CAPSULE ORAL
Qty: 30 | Refills: 0 | Status: DISCONTINUED | COMMUNITY
Start: 2019-06-12

## 2019-07-05 RX ORDER — NATEGLINIDE 120 MG/1
120 TABLET, COATED ORAL
Qty: 270 | Refills: 0 | Status: DISCONTINUED | COMMUNITY
Start: 2018-04-30

## 2019-07-05 RX ORDER — BLOOD SUGAR DIAGNOSTIC
STRIP MISCELLANEOUS
Qty: 50 | Refills: 0 | Status: DISCONTINUED | COMMUNITY
Start: 2019-06-24

## 2019-07-06 NOTE — PHYSICAL EXAM
[Restricted in physically strenuous activity but ambulatory and able to carry out work of a light or sedentary nature] : Status 1- Restricted in physically strenuous activity but ambulatory and able to carry out work of a light or sedentary nature, e.g., light house work, office work [Normal] : grossly intact [de-identified] : surgical wound on vacuum drainage

## 2019-07-06 NOTE — REVIEW OF SYSTEMS
[Recent Change In Weight] : ~T recent weight change [Lower Ext Edema] : lower extremity edema [Negative] : Allergic/Immunologic [Anxiety] : anxiety [Depression] : depression [Fever] : no fever [Chills] : no chills [Fatigue] : no fatigue [Night Sweats] : no night sweats [Suicidal] : not suicidal [Insomnia] : no insomnia [FreeTextEntry2] : 50lbs loss in last two months [FreeTextEntry5] : b/l feet

## 2019-07-06 NOTE — REASON FOR VISIT
[Initial Consultation] : an initial consultation [Family Member] : family member [FreeTextEntry2] : sigmoid colon adenocarcinoma

## 2019-07-06 NOTE — CONSULT LETTER
[Consult Letter:] : I had the pleasure of evaluating your patient, [unfilled]. [Dear  ___] : Dear  [unfilled], [Please see my note below.] : Please see my note below. [Consult Closing:] : Thank you very much for allowing me to participate in the care of this patient.  If you have any questions, please do not hesitate to contact me. [Sincerely,] : Sincerely, [FreeTextEntry3] : Debbie Jeter MD

## 2019-07-06 NOTE — ASSESSMENT
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 I had a discussion with Dr. Loredo, Pathologist. This neuroendocrine feature is not carcinoid or high grade small cell vs large cell. The morphology of cancer cells has ex-goblet cells in the colon. CT scans only showed the stricture in the proximal sigmoid colon, normal liver, no retroperitoneal lymphadenopathy. \par \par I had a lengthy discussion with the patient regarding her cancer status and further management. Given her CT abdomen and pelvis done in April and no CT chest I am planning to have staging work up including CT chest/abdomen/pelvis. If scans show suspicious lesions the biopsy will be needed.  If scans are negative she has stage IIIc sigmoid colon cancer. She needs adjuvant chemotherapy. I also explained to her stage IIIc, locally advanced with very high local and distant recurrence rate.  The standard of adjuvant chemo is FOLFOX or XELOX. However, for older patients 70 or older adding oxaliplatin is controversial given the toxicities and increasing the mortality. I am planning to start her only on capecitabine 1000mg/m2 BID for 14 days on one week off. The potential AEs are, but not limited to, fatigue, nausea, vomiting, diarrhea, hand foot syndrome, the risk of infection, myelosuppression. She agreed to sign the consent. She needs to wait for CT scan done to make sure that she does not have metastasis. I also discussed with the patient regarding adjuvant chemo timing. The best time is 6 weeks minus and plus two weeks. She is currently about 9 weeks after surgery. I am relutant to treat the patient beyond 12 weeks after surgery. Although she has nonhealing wound she does not have time to wait. Patient understood her situation. Her many questions were answered in full. \par

## 2019-07-06 NOTE — HISTORY OF PRESENT ILLNESS
[Disease: _____________________] : Disease: [unfilled] [T: ___] : T[unfilled] [N: ___] : N[unfilled] [M: ___] : M[unfilled] [de-identified] : Nishi Frazier is a 78y female  with h/o of DM, HTN, HLD who presented with crampy abdominal pain, persistent nausea, anorexia, and intermittent vomiting about two weeks and hospitalized in April, 2019 associated with 20lbs weight loss.  The patient had her first colonoscopy "a few years ago" by a Dr. Acevedo at [facility]. 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   She was supposed to get a repeat in 3 years. She denies any narrow-caliber stools, BRBPR and melena. \par \par 4/24/19: CT scan: Diffuse small bowel dilatation and fluid-filled distention of the colon to the level of the distal descending colon, possibly large bowel obstruction with incompetent ileocecal valve. Possible stricture at the level of the proximal sigmoid colon. \par \par Patient NPO with NG tube. GI performed a colonoscopy which showed diverticulosis. findings at colonoscopy seem to suggest a tight stricture which colonoscopy couldn't get thru. The possibility of a neoplasm still exists. Patient taken to the OR on 4/29 for sigmoidectomy and colostomy. 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 \par \par No CEA was checked prior to surgery. \par \par She was discharged to the rehab on May 6, out of rehab in [place].  She feels fine now, eats normally, states non healing abdominal wound 2/2 surgery still on vacuum drainage with a lot of improvement, denies nausea, vomiting and abdominal pain. She can walk but feels tired easily. \par \par  [de-identified] : poorly differentiated adenocarcinoma

## 2019-07-10 ENCOUNTER — FORM ENCOUNTER (OUTPATIENT)
Age: 79
End: 2019-07-10

## 2019-07-11 ENCOUNTER — EMERGENCY (EMERGENCY)
Facility: HOSPITAL | Age: 79
LOS: 1 days | Discharge: HOME | End: 2019-07-11
Attending: EMERGENCY MEDICINE | Admitting: EMERGENCY MEDICINE
Payer: MEDICARE

## 2019-07-11 ENCOUNTER — OUTPATIENT (OUTPATIENT)
Dept: OUTPATIENT SERVICES | Facility: HOSPITAL | Age: 79
LOS: 1 days | End: 2019-07-11
Payer: MEDICARE

## 2019-07-11 ENCOUNTER — APPOINTMENT (OUTPATIENT)
Dept: CT IMAGING | Facility: IMAGING CENTER | Age: 79
End: 2019-07-11
Payer: MEDICARE

## 2019-07-11 VITALS
OXYGEN SATURATION: 100 % | SYSTOLIC BLOOD PRESSURE: 152 MMHG | DIASTOLIC BLOOD PRESSURE: 61 MMHG | RESPIRATION RATE: 16 BRPM | TEMPERATURE: 98 F | HEART RATE: 88 BPM

## 2019-07-11 DIAGNOSIS — C18.7 MALIGNANT NEOPLASM OF SIGMOID COLON: ICD-10-CM

## 2019-07-11 LAB
ALBUMIN SERPL ELPH-MCNC: 3.6 G/DL — SIGNIFICANT CHANGE UP (ref 3.3–5)
ALP SERPL-CCNC: 44 U/L — SIGNIFICANT CHANGE UP (ref 40–120)
ALT FLD-CCNC: 9 U/L — SIGNIFICANT CHANGE UP (ref 4–33)
ANION GAP SERPL CALC-SCNC: 10 MMO/L — SIGNIFICANT CHANGE UP (ref 7–14)
APTT BLD: 18.4 SEC — LOW (ref 27.5–36.3)
AST SERPL-CCNC: 15 U/L — SIGNIFICANT CHANGE UP (ref 4–32)
BASOPHILS # BLD AUTO: 0.05 K/UL — SIGNIFICANT CHANGE UP (ref 0–0.2)
BASOPHILS NFR BLD AUTO: 0.7 % — SIGNIFICANT CHANGE UP (ref 0–2)
BILIRUB SERPL-MCNC: 0.3 MG/DL — SIGNIFICANT CHANGE UP (ref 0.2–1.2)
BUN SERPL-MCNC: 9 MG/DL — SIGNIFICANT CHANGE UP (ref 7–23)
CALCIUM SERPL-MCNC: 9.6 MG/DL — SIGNIFICANT CHANGE UP (ref 8.4–10.5)
CHLORIDE SERPL-SCNC: 100 MMOL/L — SIGNIFICANT CHANGE UP (ref 98–107)
CO2 SERPL-SCNC: 27 MMOL/L — SIGNIFICANT CHANGE UP (ref 22–31)
CREAT SERPL-MCNC: 0.63 MG/DL — SIGNIFICANT CHANGE UP (ref 0.5–1.3)
EOSINOPHIL # BLD AUTO: 0.09 K/UL — SIGNIFICANT CHANGE UP (ref 0–0.5)
EOSINOPHIL NFR BLD AUTO: 1.3 % — SIGNIFICANT CHANGE UP (ref 0–6)
GLUCOSE SERPL-MCNC: 109 MG/DL — HIGH (ref 70–99)
HCT VFR BLD CALC: 32.5 % — LOW (ref 34.5–45)
HGB BLD-MCNC: 10.3 G/DL — LOW (ref 11.5–15.5)
IMM GRANULOCYTES NFR BLD AUTO: 0.1 % — SIGNIFICANT CHANGE UP (ref 0–1.5)
INR BLD: 1.06 — SIGNIFICANT CHANGE UP (ref 0.88–1.17)
LYMPHOCYTES # BLD AUTO: 2.01 K/UL — SIGNIFICANT CHANGE UP (ref 1–3.3)
LYMPHOCYTES # BLD AUTO: 27.9 % — SIGNIFICANT CHANGE UP (ref 13–44)
MAGNESIUM SERPL-MCNC: 1.7 MG/DL — SIGNIFICANT CHANGE UP (ref 1.6–2.6)
MCHC RBC-ENTMCNC: 30.3 PG — SIGNIFICANT CHANGE UP (ref 27–34)
MCHC RBC-ENTMCNC: 31.7 % — LOW (ref 32–36)
MCV RBC AUTO: 95.6 FL — SIGNIFICANT CHANGE UP (ref 80–100)
MONOCYTES # BLD AUTO: 0.7 K/UL — SIGNIFICANT CHANGE UP (ref 0–0.9)
MONOCYTES NFR BLD AUTO: 9.7 % — SIGNIFICANT CHANGE UP (ref 2–14)
NEUTROPHILS # BLD AUTO: 4.34 K/UL — SIGNIFICANT CHANGE UP (ref 1.8–7.4)
NEUTROPHILS NFR BLD AUTO: 60.3 % — SIGNIFICANT CHANGE UP (ref 43–77)
NRBC # FLD: 0 K/UL — SIGNIFICANT CHANGE UP (ref 0–0)
PHOSPHATE SERPL-MCNC: 4 MG/DL — SIGNIFICANT CHANGE UP (ref 2.5–4.5)
PLATELET # BLD AUTO: 331 K/UL — SIGNIFICANT CHANGE UP (ref 150–400)
PMV BLD: 10.5 FL — SIGNIFICANT CHANGE UP (ref 7–13)
POTASSIUM SERPL-MCNC: 4.2 MMOL/L — SIGNIFICANT CHANGE UP (ref 3.5–5.3)
POTASSIUM SERPL-SCNC: 4.2 MMOL/L — SIGNIFICANT CHANGE UP (ref 3.5–5.3)
PROT SERPL-MCNC: 7.1 G/DL — SIGNIFICANT CHANGE UP (ref 6–8.3)
PROTHROM AB SERPL-ACNC: 11.8 SEC — SIGNIFICANT CHANGE UP (ref 9.8–13.1)
RBC # BLD: 3.4 M/UL — LOW (ref 3.8–5.2)
RBC # FLD: 13.8 % — SIGNIFICANT CHANGE UP (ref 10.3–14.5)
SODIUM SERPL-SCNC: 137 MMOL/L — SIGNIFICANT CHANGE UP (ref 135–145)
WBC # BLD: 7.2 K/UL — SIGNIFICANT CHANGE UP (ref 3.8–10.5)
WBC # FLD AUTO: 7.2 K/UL — SIGNIFICANT CHANGE UP (ref 3.8–10.5)

## 2019-07-11 PROCEDURE — 71260 CT THORAX DX C+: CPT

## 2019-07-11 PROCEDURE — 71260 CT THORAX DX C+: CPT | Mod: 26

## 2019-07-11 PROCEDURE — 99284 EMERGENCY DEPT VISIT MOD MDM: CPT | Mod: GC

## 2019-07-11 PROCEDURE — 74177 CT ABD & PELVIS W/CONTRAST: CPT

## 2019-07-11 PROCEDURE — 82565 ASSAY OF CREATININE: CPT

## 2019-07-11 PROCEDURE — 74177 CT ABD & PELVIS W/CONTRAST: CPT | Mod: 26

## 2019-07-11 NOTE — ED PROVIDER NOTE - NSFOLLOWUPINSTRUCTIONS_ED_ALL_ED_FT
Follow-up with your Surgeon within 2 - 5 days.  Please return to the Emergency Department immediately for any new, worsening or concerning symptoms.  Copy of your lab work, imaging and/or other testing performed in the ER is attached along with your discharge paperwork.  Please take this to your Primary Care Physician for further discussion, evaluation and comparison with your prior blood work results.     Can use 1000mg Tylenol every 6 hours for pain - as needed.  This is an over-the-counter medications - please respect the warnings on the label.     Return to the Emergency Department immediately if you experience worsening pain, fever, drainage from site, redness, red streaking, and/or new, worsening or concerning symptoms.

## 2019-07-11 NOTE — ED PROVIDER NOTE - PHYSICAL EXAMINATION
*Gen: NAD, AAO*3  *HEENT: NC/AT, MMM, airway patent, trachea midline  *CV: RRR, S1/S2 present, no murmurs/rubs  *Resp: no respiratory distress, LCTAB, no wheezing/rales  *Abd: wound vac. in place; mild induration on abdominal surgical wound  *Neuro: no focal neuro deficits, moving all limbs appropriately  *Extremities: no gross deformity  *Skin: no rashes, no wounds   ~ Monae Talavera M.D.

## 2019-07-11 NOTE — ED PROVIDER NOTE - ATTENDING CONTRIBUTION TO CARE
MD Mccormick:  patient seen and evaluated with the resident.  I was present for key portions of the History & Physical, and I agree with the Impression & Plan.  MD Mccormick:  79 yo F, c/o drainage near surgical wound vac.  Patient is 2 mos s/p partial colectomy after presenting to the hospital with an SBO due to colon CA.  Patient underwent CT abd prior to ED visit which showed a small superificial fluid collection.  Denies Abd pain, except near site of drainage.  Associated Sx:  No N/V/D, no F/C.  Taking normal POs.  Quality of drainage = green.    VS: wnl.   Elderly F, NAD, wound vac on abdominal wall intact.  +scant drainage at superior margin of the surgical wound, unable to express additional drainage.   Rest of abdomen soft, ND, NT.    Impression:  suspect abdominal wall abscess w/o clinical evidence of intraabdominal infection or SBO.   Plan:  Gen Surg already aware of patient and at bedside.  Reviewing out side CT.  In meantime, will check labs, reassess.  Likely dispo per general surgery.

## 2019-07-11 NOTE — ED PROVIDER NOTE - CLINICAL SUMMARY MEDICAL DECISION MAKING FREE TEXT BOX
78 year-old female with recent partial colostomy and colectomy with wound vac in place presents to the Emergency Department for concerns for surgical site infection.  Afebrile; abdomen soft N/T.  Plan for basic labs; CT scan done outpatient.

## 2019-07-11 NOTE — ED PROVIDER NOTE - OBJECTIVE STATEMENT
78 year-old female with history of DM, HTN, HLD, partial colectomy and colostomy presents to the Emergency Department for concerns for drainage from surgical care site.  No fevers, chills, nausea, vomiting, chest pain, shortness of breath, abdominal pain.  No BIS.  Patient able to tolerate PO intake.

## 2019-07-11 NOTE — CONSULT NOTE ADULT - SUBJECTIVE AND OBJECTIVE BOX
Consulting surgical team: KINJAL Team [id number]  Consulting attending: Dr. Hdz    HPI:  79 yo woman with a hx of DM, HTN, HLD, colon adenocarcinoma s/p Jose resection     78y female - h/o of DM, HTN, HLD - presenting with crampy abdominal pain, persistent nausea, anorexia, and intermittent vomiting which has been slowly progressing over the preceding two weeks. The patient had her first colonoscopy "a few years ago" by a Dr. Acevedo at [facility]. Based upon the description offered during history-taking, it sounds as though she may have had polyps removed during this colonoscopy, and she was supposed to get a repeat in "3 years." She does report unintentional weight loss over the past several months of roughly 20 lbs. She denies any narrow-caliber stools, any bloody or dark stools. She denies any historical episodes of diverticulitis/unexplained abdominal pain.      PAST MEDICAL HISTORY:  DM (diabetes mellitus)  HLD (hyperlipidemia)  HTN (hypertension)      PAST SURGICAL HISTORY:  No significant past surgical history      MEDICATIONS:      ALLERGIES:  No Known Allergies      VITALS & I/Os:  Vital Signs Last 24 Hrs  T(C): 36.7 (11 Jul 2019 11:39), Max: 36.7 (11 Jul 2019 11:39)  T(F): 98 (11 Jul 2019 11:39), Max: 98 (11 Jul 2019 11:39)  HR: 88 (11 Jul 2019 11:39) (88 - 88)  BP: 152/61 (11 Jul 2019 11:39) (152/61 - 152/61)  BP(mean): --  RR: 16 (11 Jul 2019 11:39) (16 - 16)  SpO2: 100% (11 Jul 2019 11:39) (100% - 100%)    I&O's Summary      PHYSICAL EXAM:  General: No acute distress  Respiratory: Nonlabored  Cardiovascular: RRR  Abdominal: Soft, nondistended, nontender. No rebound or guarding. No organomegaly, no palpable mass.  Extremities: Warm    LABS:                        10.3   7.20  )-----------( 331      ( 11 Jul 2019 12:55 )             32.5     07-11    137  |  100  |  9   ----------------------------<  109<H>  4.2   |  27  |  0.63    Ca    9.6      11 Jul 2019 12:55  Phos  4.0     07-11  Mg     1.7     07-11    TPro  7.1  /  Alb  3.6  /  TBili  0.3  /  DBili  x   /  AST  15  /  ALT  9   /  AlkPhos  44  07-11    Lactate:    PT/INR - ( 11 Jul 2019 12:55 )   PT: 11.8 SEC;   INR: 1.06          PTT - ( 11 Jul 2019 12:55 )  PTT:18.4 SEC              IMAGING: Consulting surgical team: KINJAL Team [id number]  Consulting attending: Dr. Hdz    HPI:  77 yo woman with a hx of DM, HTN, HLD, colon adenocarcinoma (T4N2) s/p Jose resection in April 2019 presenting with erythema and pus draining from a "pinhole" superior to the surgical incision. She otherwise reports she has been doing well, tolerating diet, denies fevers, chills, N/V.    She has been seeing her oncologist in preparation to begin adjuvant chemotherapy -- she underwent CT C/A/P today prior to presentation. She also has been getting home nursing care for wound vac changes.      PAST MEDICAL HISTORY:  DM (diabetes mellitus)  HLD (hyperlipidemia)  HTN (hypertension)      PAST SURGICAL HISTORY:  No significant past surgical history      MEDICATIONS:  Home Medications:  acetaminophen 325 mg oral tablet: 2 tab(s) orally every 6 hours, As needed, Mild Pain (1 - 3) (03 May 2019 15:10)  ezetimibe-simvastatin 10 mg-20 mg oral tablet: 1 tab(s) orally once a day (at bedtime) (24 Apr 2019 20:08)  metFORMIN 500 mg oral tablet: 1 tab(s) orally 2 times a day (24 Apr 2019 20:07)  Metoprolol Tartrate 50 mg oral tablet: 1 tab(s) orally once a day (24 Apr 2019 20:07)  nateglinide 120 mg oral tablet: 1 tab(s) orally 3 times a day (before meals) (24 Apr 2019 20:07)  oxyCODONE 10 mg oral tablet: 1 tab(s) orally every 4 hours, As needed, Severe Pain (7 - 10) (03 May 2019 15:10)  oxyCODONE 5 mg oral tablet: 1 tab(s) orally every 4 hours, As needed, Moderate Pain (4 - 6) (03 May 2019 15:10)  quinapril-hydrochlorothiazide 10mg-12.5mg oral tablet: 1 tab(s) orally once a day (24 Apr 2019 20:07)      ALLERGIES:  No Known Allergies      VITALS & I/Os:  Vital Signs Last 24 Hrs  T(C): 36.7 (11 Jul 2019 11:39), Max: 36.7 (11 Jul 2019 11:39)  T(F): 98 (11 Jul 2019 11:39), Max: 98 (11 Jul 2019 11:39)  HR: 88 (11 Jul 2019 11:39) (88 - 88)  BP: 152/61 (11 Jul 2019 11:39) (152/61 - 152/61)  BP(mean): --  RR: 16 (11 Jul 2019 11:39) (16 - 16)  SpO2: 100% (11 Jul 2019 11:39) (100% - 100%)      PHYSICAL EXAM:  GEN: NAD, resting quietly  PULM: symmetric chest rise bilaterally, no increased WOB  CV: regular rate  ABD: small opening at superior portion of midline wound with scant purulent drainage, no erythema; right transverse periumbilical incision with wound vac to suction, left colostomy with formed output, soft, non-distended, non-tender  EXTR: no cyanosis or edema, moving all extremities      LABS:                        10.3   7.20  )-----------( 331      ( 11 Jul 2019 12:55 )             32.5     07-11    137  |  100  |  9   ----------------------------<  109<H>  4.2   |  27  |  0.63    Ca    9.6      11 Jul 2019 12:55  Phos  4.0     07-11  Mg     1.7     07-11    TPro  7.1  /  Alb  3.6  /  TBili  0.3  /  DBili  x   /  AST  15  /  ALT  9   /  AlkPhos  44  07-11    Lactate:    PT/INR - ( 11 Jul 2019 12:55 )   PT: 11.8 SEC;   INR: 1.06          PTT - ( 11 Jul 2019 12:55 )  PTT:18.4 SEC        IMAGING:  CT A/P at [facility] (official read pending): 17 mm abscess cavity

## 2019-07-11 NOTE — CONSULT NOTE ADULT - ASSESSMENT
77 yo woman with a hx of DM, HTN, HLD, colon adenocarcinoma (T4N2) s/p Jose resection in April 2019 presenting with superficial cutaneous abscess at the superolateral surgical incision. The abscess began draining spontaneously prior to presentation.    - no urgent surgical intervention  - wound packed at bedside. Instructed family on packing changes  - cont home wound vac changes  - patient may follow up with Dr. Hdz in clinic    Patient discussed with Dr. Hdz    B Team Surgery  [id number]

## 2019-07-11 NOTE — ED PROVIDER NOTE - PROGRESS NOTE DETAILS
Ilan GE: Patient seen by surgery; deemed local wound reaction and no need for outpatient antibiotics and do local wound care with wound nurse at home (patient has this set up).  Discussed signs and symptoms of infection.  Patient agrees with plan and would like to go home.

## 2019-07-11 NOTE — ED ADULT NURSE NOTE - OBJECTIVE STATEMENT
Patient with a wound vac in place, c/o some drainage next to wound vac site. Presently the wound is covered with a dressing. Patient appears in no distress.

## 2019-07-11 NOTE — ED ADULT TRIAGE NOTE - CHIEF COMPLAINT QUOTE
Pt. c/o serous drainage to surgical wound on RLQ x 2 days. States she had a color-resection 2 month ago. Wound vac and colostomy noted. Sent to see Dr. Hdz if available. Denies fevers/chills

## 2019-07-24 ENCOUNTER — OUTPATIENT (OUTPATIENT)
Dept: OUTPATIENT SERVICES | Facility: HOSPITAL | Age: 79
LOS: 1 days | Discharge: HOME | End: 2019-07-24

## 2019-07-24 DIAGNOSIS — C18.9 MALIGNANT NEOPLASM OF COLON, UNSPECIFIED: ICD-10-CM

## 2019-07-26 ENCOUNTER — OTHER (OUTPATIENT)
Age: 79
End: 2019-07-26

## 2019-07-29 ENCOUNTER — RESULT REVIEW (OUTPATIENT)
Age: 79
End: 2019-07-29

## 2019-07-29 ENCOUNTER — APPOINTMENT (OUTPATIENT)
Dept: HEMATOLOGY ONCOLOGY | Facility: CLINIC | Age: 79
End: 2019-07-29
Payer: MEDICARE

## 2019-07-29 VITALS
BODY MASS INDEX: 26.53 KG/M2 | TEMPERATURE: 98.5 F | SYSTOLIC BLOOD PRESSURE: 151 MMHG | OXYGEN SATURATION: 99 % | HEART RATE: 92 BPM | WEIGHT: 148.81 LBS | DIASTOLIC BLOOD PRESSURE: 76 MMHG | RESPIRATION RATE: 16 BRPM

## 2019-07-29 LAB
BASOPHILS # BLD AUTO: 0 K/UL — SIGNIFICANT CHANGE UP (ref 0–0.2)
BASOPHILS NFR BLD AUTO: 0.4 % — SIGNIFICANT CHANGE UP (ref 0–2)
EOSINOPHIL # BLD AUTO: 0.3 K/UL — SIGNIFICANT CHANGE UP (ref 0–0.5)
EOSINOPHIL NFR BLD AUTO: 3.2 % — SIGNIFICANT CHANGE UP (ref 0–6)
HCT VFR BLD CALC: 32.5 % — LOW (ref 34.5–45)
HGB BLD-MCNC: 10.6 G/DL — LOW (ref 11.5–15.5)
LYMPHOCYTES # BLD AUTO: 3.2 K/UL — SIGNIFICANT CHANGE UP (ref 1–3.3)
LYMPHOCYTES # BLD AUTO: 30.9 % — SIGNIFICANT CHANGE UP (ref 13–44)
MCHC RBC-ENTMCNC: 31.9 PG — SIGNIFICANT CHANGE UP (ref 27–34)
MCHC RBC-ENTMCNC: 32.5 G/DL — SIGNIFICANT CHANGE UP (ref 32–36)
MCV RBC AUTO: 98.2 FL — SIGNIFICANT CHANGE UP (ref 80–100)
MONOCYTES # BLD AUTO: 0.5 K/UL — SIGNIFICANT CHANGE UP (ref 0–0.9)
MONOCYTES NFR BLD AUTO: 4.6 % — SIGNIFICANT CHANGE UP (ref 2–14)
NEUTROPHILS # BLD AUTO: 6.4 K/UL — SIGNIFICANT CHANGE UP (ref 1.8–7.4)
NEUTROPHILS NFR BLD AUTO: 60.9 % — SIGNIFICANT CHANGE UP (ref 43–77)
PLATELET # BLD AUTO: 357 K/UL — SIGNIFICANT CHANGE UP (ref 150–400)
RBC # BLD: 3.31 M/UL — LOW (ref 3.8–5.2)
RBC # FLD: 14.2 % — SIGNIFICANT CHANGE UP (ref 10.3–14.5)
WBC # BLD: 10.5 K/UL — SIGNIFICANT CHANGE UP (ref 3.8–10.5)
WBC # FLD AUTO: 10.5 K/UL — SIGNIFICANT CHANGE UP (ref 3.8–10.5)

## 2019-07-29 PROCEDURE — 99215 OFFICE O/P EST HI 40 MIN: CPT

## 2019-07-29 NOTE — REVIEW OF SYSTEMS
[Recent Change In Weight] : ~T recent weight change [Lower Ext Edema] : lower extremity edema [Anxiety] : anxiety [Depression] : depression [Negative] : Allergic/Immunologic [Fever] : no fever [Chills] : no chills [Night Sweats] : no night sweats [Fatigue] : no fatigue [Insomnia] : no insomnia [Suicidal] : not suicidal [FreeTextEntry2] : 10lbs gain in one month [FreeTextEntry5] : b/l feet

## 2019-07-29 NOTE — CONSULT LETTER
[Dear  ___] : Dear  [unfilled], [Consult Letter:] : I had the pleasure of evaluating your patient, [unfilled]. [Please see my note below.] : Please see my note below. [Consult Closing:] : Thank you very much for allowing me to participate in the care of this patient.  If you have any questions, please do not hesitate to contact me. [Sincerely,] : Sincerely, [FreeTextEntry3] : Debbie Jeter MD

## 2019-07-29 NOTE — ASSESSMENT
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 I had a discussion with Dr. Loredo, Pathologist. This neuroendocrine feature is not carcinoid or high grade small cell vs large cell. The morphology of cancer cells has ex-goblet cells in the colon. CT scans only showed the stricture in the proximal sigmoid colon, normal liver, no retroperitoneal lymphadenopathy. If scans show suspicious lesions the biopsy will be needed.  If scans are negative she has stage IIIc sigmoid colon cancer. She needs adjuvant chemotherapy. I also explained to her stage IIIc, locally advanced with very high local and distant recurrence rate.  The standard of adjuvant chemo is FOLFOX or XELOX. However, for older patients 70 or older adding oxaliplatin is controversial given the toxicities and increasing the mortality. I am planning to start her only on capecitabine 1000mg/m2 BID for 14 days on one week off. The potential AEs are, but not limited to, fatigue, nausea, vomiting, diarrhea, hand foot syndrome, the risk of infection, myelosuppression. She agreed to sign the consent. s/p C1D14 capecitabine adjuvant chemotherapy\par \par Plan \par continue C2 capecitabine \par check labs\par RTC in 3 weeks\par

## 2019-07-29 NOTE — PHYSICAL EXAM
[Restricted in physically strenuous activity but ambulatory and able to carry out work of a light or sedentary nature] : Status 1- Restricted in physically strenuous activity but ambulatory and able to carry out work of a light or sedentary nature, e.g., light house work, office work [Normal] : affect appropriate [de-identified] : surgical wound

## 2019-07-29 NOTE — HISTORY OF PRESENT ILLNESS
[Disease: _____________________] : Disease: [unfilled] [T: ___] : T[unfilled] [N: ___] : N[unfilled] [M: ___] : M[unfilled] [de-identified] : Nishi Frazier is a 78y female  with h/o of DM, HTN, HLD who presented with crampy abdominal pain, persistent nausea, anorexia, and intermittent vomiting about two weeks and hospitalized in April, 2019 associated with 20lbs weight loss.  The patient had her first colonoscopy "a few years ago" by a Dr. Acevedo at [facility]. 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   She was supposed to get a repeat in 3 years. She denies any narrow-caliber stools, BRBPR and melena. \par \par 4/24/19: CT scan: Diffuse small bowel dilatation and fluid-filled distention of the colon to the level of the distal descending colon, possibly large bowel obstruction with incompetent ileocecal valve. Possible stricture at the level of the proximal sigmoid colon. \par \par Patient NPO with NG tube. GI performed a colonoscopy which showed diverticulosis. findings at colonoscopy seem to suggest a tight stricture which colonoscopy couldn't get thru. The possibility of a neoplasm still exists. Patient taken to the OR on 4/29 for sigmoidectomy and colostomy. 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 \par \par No CEA was checked prior to surgery. \par \par She was discharged to the rehab on May 6, out of rehab in [place].  She feels fine now, eats normally, states non healing abdominal wound 2/2 surgery still on vacuum drainage with a lot of improvement, denies nausea, vomiting and abdominal pain. She can walk but feels tired easily. \par \par  [de-identified] : poorly differentiated adenocarcinoma [de-identified] : 7/29/2019  C1D14 capecitabine 1650mg BID. She has a little fatigue, denies nausea, vomiting, abdominal pain, diarrhea, constipation. Her appetite is good.

## 2019-09-04 ENCOUNTER — OUTPATIENT (OUTPATIENT)
Dept: OUTPATIENT SERVICES | Facility: HOSPITAL | Age: 79
LOS: 1 days | Discharge: HOME | End: 2019-09-04

## 2019-09-04 DIAGNOSIS — C18.9 MALIGNANT NEOPLASM OF COLON, UNSPECIFIED: ICD-10-CM

## 2019-09-06 ENCOUNTER — APPOINTMENT (OUTPATIENT)
Dept: HEMATOLOGY ONCOLOGY | Facility: CLINIC | Age: 79
End: 2019-09-06
Payer: MEDICARE

## 2019-09-06 ENCOUNTER — RESULT REVIEW (OUTPATIENT)
Age: 79
End: 2019-09-06

## 2019-09-06 VITALS
WEIGHT: 151.68 LBS | TEMPERATURE: 99.6 F | OXYGEN SATURATION: 97 % | HEART RATE: 82 BPM | DIASTOLIC BLOOD PRESSURE: 66 MMHG | SYSTOLIC BLOOD PRESSURE: 150 MMHG | RESPIRATION RATE: 16 BRPM | BODY MASS INDEX: 27.04 KG/M2

## 2019-09-06 LAB
BASOPHILS # BLD AUTO: 0 K/UL — SIGNIFICANT CHANGE UP (ref 0–0.2)
BASOPHILS NFR BLD AUTO: 0.4 % — SIGNIFICANT CHANGE UP (ref 0–2)
EOSINOPHIL # BLD AUTO: 0.2 K/UL — SIGNIFICANT CHANGE UP (ref 0–0.5)
EOSINOPHIL NFR BLD AUTO: 2 % — SIGNIFICANT CHANGE UP (ref 0–6)
HCT VFR BLD CALC: 33.6 % — LOW (ref 34.5–45)
HGB BLD-MCNC: 10.7 G/DL — LOW (ref 11.5–15.5)
LYMPHOCYTES # BLD AUTO: 2.6 K/UL — SIGNIFICANT CHANGE UP (ref 1–3.3)
LYMPHOCYTES # BLD AUTO: 27.1 % — SIGNIFICANT CHANGE UP (ref 13–44)
MCHC RBC-ENTMCNC: 31.5 PG — SIGNIFICANT CHANGE UP (ref 27–34)
MCHC RBC-ENTMCNC: 31.8 G/DL — LOW (ref 32–36)
MCV RBC AUTO: 99.2 FL — SIGNIFICANT CHANGE UP (ref 80–100)
MONOCYTES # BLD AUTO: 0.4 K/UL — SIGNIFICANT CHANGE UP (ref 0–0.9)
MONOCYTES NFR BLD AUTO: 3.9 % — SIGNIFICANT CHANGE UP (ref 2–14)
NEUTROPHILS # BLD AUTO: 6.5 K/UL — SIGNIFICANT CHANGE UP (ref 1.8–7.4)
NEUTROPHILS NFR BLD AUTO: 66.5 % — SIGNIFICANT CHANGE UP (ref 43–77)
PLATELET # BLD AUTO: 286 K/UL — SIGNIFICANT CHANGE UP (ref 150–400)
RBC # BLD: 3.39 M/UL — LOW (ref 3.8–5.2)
RBC # FLD: 16.6 % — HIGH (ref 10.3–14.5)
WBC # BLD: 9.7 K/UL — SIGNIFICANT CHANGE UP (ref 3.8–10.5)
WBC # FLD AUTO: 9.7 K/UL — SIGNIFICANT CHANGE UP (ref 3.8–10.5)

## 2019-09-06 PROCEDURE — 99214 OFFICE O/P EST MOD 30 MIN: CPT

## 2019-09-06 NOTE — ASSESSMENT
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 I had a discussion with Dr. Loredo, Pathologist. This neuroendocrine feature is not carcinoid or high grade small cell vs large cell. The morphology of cancer cells has ex-goblet cells in the colon. CT scans only showed the stricture in the proximal sigmoid colon, normal liver, no retroperitoneal lymphadenopathy. If scans show suspicious lesions the biopsy will be needed.  If scans are negative she has stage IIIc sigmoid colon cancer. She needs adjuvant chemotherapy. I also explained to her stage IIIc, locally advanced with very high local and distant recurrence rate.  The standard of adjuvant chemo is FOLFOX or XELOX. However, for older patients 70 or older adding oxaliplatin is controversial given the toxicities and increasing the mortality. I am planning to start her only on capecitabine 1000mg/m2 BID for 14 days on one week off. The potential AEs are, but not limited to, fatigue, nausea, vomiting, diarrhea, hand foot syndrome, the risk of infection, myelosuppression. She agreed to sign the consent. s/p C3D11 capecitabine adjuvant chemotherapy. She tolerates well.\par \par Plan \par continue C4 capecitabine \par check labs\par RTC in 3 weeks\par

## 2019-09-06 NOTE — REVIEW OF SYSTEMS
[Depression] : depression [Anxiety] : anxiety [Negative] : Allergic/Immunologic [Fever] : no fever [Chills] : no chills [Night Sweats] : no night sweats [Fatigue] : no fatigue [Recent Change In Weight] : ~T no recent weight change [Lower Ext Edema] : no lower extremity edema [Suicidal] : not suicidal [Insomnia] : no insomnia [FreeTextEntry5] : b/l feet [FreeTextEntry2] : 10lbs gain in one month

## 2019-09-06 NOTE — PHYSICAL EXAM
[Restricted in physically strenuous activity but ambulatory and able to carry out work of a light or sedentary nature] : Status 1- Restricted in physically strenuous activity but ambulatory and able to carry out work of a light or sedentary nature, e.g., light house work, office work [Normal] : affect appropriate [de-identified] : surgical wound

## 2019-09-06 NOTE — HISTORY OF PRESENT ILLNESS
[T: ___] : T[unfilled] [Disease: _____________________] : Disease: [unfilled] [N: ___] : N[unfilled] [M: ___] : M[unfilled] [de-identified] : Nishi Frazier is a 78y female  with h/o of DM, HTN, HLD who presented with crampy abdominal pain, persistent nausea, anorexia, and intermittent vomiting about two weeks and hospitalized in April, 2019 associated with 20lbs weight loss.  The patient had her first colonoscopy "a few years ago" by a Dr. Acevedo at [facility]. 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   She was supposed to get a repeat in 3 years. She denies any narrow-caliber stools, BRBPR and melena. \par \par 4/24/19: CT scan: Diffuse small bowel dilatation and fluid-filled distention of the colon to the level of the distal descending colon, possibly large bowel obstruction with incompetent ileocecal valve. Possible stricture at the level of the proximal sigmoid colon. \par \par Patient NPO with NG tube. GI performed a colonoscopy which showed diverticulosis. findings at colonoscopy seem to suggest a tight stricture which colonoscopy couldn't get thru. The possibility of a neoplasm still exists. Patient taken to the OR on 4/29 for sigmoidectomy and colostomy. 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 \par \par No CEA was checked prior to surgery. \par \par She was discharged to the rehab on May 6, out of rehab in [place].  She feels fine now, eats normally, states non healing abdominal wound 2/2 surgery still on vacuum drainage with a lot of improvement, denies nausea, vomiting and abdominal pain. She can walk but feels tired easily. \par \par  [de-identified] : poorly differentiated adenocarcinoma [de-identified] : 7/29/2019  C1D14 capecitabine 1650mg BID. She has a little fatigue, denies nausea, vomiting, abdominal pain, diarrhea, constipation. Her appetite is good. \par \par 9/6/2019  C3D11 capecitabine 1650mg BID. She feels fine, tolerates well, denies nausea, vomiting, abdominal pain, fatigue, diarrhea, constipation. Her appetite is good.

## 2019-09-06 NOTE — CONSULT LETTER
[Consult Letter:] : I had the pleasure of evaluating your patient, [unfilled]. [Dear  ___] : Dear  [unfilled], [Consult Closing:] : Thank you very much for allowing me to participate in the care of this patient.  If you have any questions, please do not hesitate to contact me. [Please see my note below.] : Please see my note below. [Sincerely,] : Sincerely, [FreeTextEntry3] : Debbie Jeter MD

## 2019-10-03 ENCOUNTER — OUTPATIENT (OUTPATIENT)
Dept: OUTPATIENT SERVICES | Facility: HOSPITAL | Age: 79
LOS: 1 days | Discharge: HOME | End: 2019-10-03

## 2019-10-03 DIAGNOSIS — C18.9 MALIGNANT NEOPLASM OF COLON, UNSPECIFIED: ICD-10-CM

## 2019-10-07 ENCOUNTER — RESULT REVIEW (OUTPATIENT)
Age: 79
End: 2019-10-07

## 2019-10-07 ENCOUNTER — APPOINTMENT (OUTPATIENT)
Dept: HEMATOLOGY ONCOLOGY | Facility: CLINIC | Age: 79
End: 2019-10-07
Payer: MEDICARE

## 2019-10-07 VITALS
RESPIRATION RATE: 1 BRPM | WEIGHT: 154.32 LBS | TEMPERATURE: 97.5 F | OXYGEN SATURATION: 100 % | SYSTOLIC BLOOD PRESSURE: 162 MMHG | DIASTOLIC BLOOD PRESSURE: 69 MMHG | BODY MASS INDEX: 27.52 KG/M2 | HEART RATE: 101 BPM

## 2019-10-07 LAB
BASOPHILS # BLD AUTO: 0.1 K/UL — SIGNIFICANT CHANGE UP (ref 0–0.2)
BASOPHILS NFR BLD AUTO: 0.9 % — SIGNIFICANT CHANGE UP (ref 0–2)
EOSINOPHIL # BLD AUTO: 0.1 K/UL — SIGNIFICANT CHANGE UP (ref 0–0.5)
EOSINOPHIL NFR BLD AUTO: 1.7 % — SIGNIFICANT CHANGE UP (ref 0–6)
HCT VFR BLD CALC: 31.3 % — LOW (ref 34.5–45)
HGB BLD-MCNC: 10.7 G/DL — LOW (ref 11.5–15.5)
LYMPHOCYTES # BLD AUTO: 2.2 K/UL — SIGNIFICANT CHANGE UP (ref 1–3.3)
LYMPHOCYTES # BLD AUTO: 31.8 % — SIGNIFICANT CHANGE UP (ref 13–44)
MCHC RBC-ENTMCNC: 34.1 G/DL — SIGNIFICANT CHANGE UP (ref 32–36)
MCHC RBC-ENTMCNC: 35.4 PG — HIGH (ref 27–34)
MCV RBC AUTO: 104 FL — HIGH (ref 80–100)
MONOCYTES # BLD AUTO: 0.5 K/UL — SIGNIFICANT CHANGE UP (ref 0–0.9)
MONOCYTES NFR BLD AUTO: 7.8 % — SIGNIFICANT CHANGE UP (ref 2–14)
NEUTROPHILS # BLD AUTO: 4 K/UL — SIGNIFICANT CHANGE UP (ref 1.8–7.4)
NEUTROPHILS NFR BLD AUTO: 57.8 % — SIGNIFICANT CHANGE UP (ref 43–77)
PLATELET # BLD AUTO: 260 K/UL — SIGNIFICANT CHANGE UP (ref 150–400)
RBC # BLD: 3.02 M/UL — LOW (ref 3.8–5.2)
RBC # FLD: 18.3 % — HIGH (ref 10.3–14.5)
WBC # BLD: 6.8 K/UL — SIGNIFICANT CHANGE UP (ref 3.8–10.5)
WBC # FLD AUTO: 6.8 K/UL — SIGNIFICANT CHANGE UP (ref 3.8–10.5)

## 2019-10-07 PROCEDURE — 99214 OFFICE O/P EST MOD 30 MIN: CPT

## 2019-10-07 NOTE — REASON FOR VISIT
[Follow-Up Visit] : a follow-up [Family Member] : family member [FreeTextEntry2] : sigmoid colon adenocarcinoma

## 2019-10-07 NOTE — PHYSICAL EXAM
[Restricted in physically strenuous activity but ambulatory and able to carry out work of a light or sedentary nature] : Status 1- Restricted in physically strenuous activity but ambulatory and able to carry out work of a light or sedentary nature, e.g., light house work, office work [Normal] : affect appropriate [de-identified] : surgical wound

## 2019-10-07 NOTE — REVIEW OF SYSTEMS
[Negative] : Allergic/Immunologic [Fever] : no fever [Chills] : no chills [Night Sweats] : no night sweats [Fatigue] : no fatigue [Recent Change In Weight] : ~T no recent weight change [Lower Ext Edema] : no lower extremity edema [Suicidal] : not suicidal [Anxiety] : no anxiety [Insomnia] : no insomnia [Depression] : no depression [FreeTextEntry2] : 10lbs gain in one month [FreeTextEntry5] : b/l feet

## 2019-10-07 NOTE — ASSESSMENT
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 I had a discussion with Dr. Loredo, Pathologist. This neuroendocrine feature is not carcinoid or high grade small cell vs large cell. The morphology of cancer cells has ex-goblet cells in the colon. CT scans only showed the stricture in the proximal sigmoid colon, normal liver, no retroperitoneal lymphadenopathy. If scans show suspicious lesions the biopsy will be needed.  If scans are negative she has stage IIIc sigmoid colon cancer. She needs adjuvant chemotherapy. I also explained to her stage IIIc, locally advanced with very high local and distant recurrence rate.  The standard of adjuvant chemo is FOLFOX or XELOX. However, for older patients 70 or older adding oxaliplatin is controversial given the toxicities and increasing the mortality. I am planning to start her only on capecitabine 1000mg/m2 BID for 14 days on one week off. The potential AEs are, but not limited to, fatigue, nausea, vomiting, diarrhea, hand foot syndrome, the risk of infection, myelosuppression. She agreed to sign the consent. s/p cycle 4 capecitabine adjuvant chemotherapy. She complains of some hair loss, otherwise, tolerates well.\par \par Plan \par continue C5 capecitabine \par check labs\par RTC in 3 weeks\par

## 2019-10-07 NOTE — HISTORY OF PRESENT ILLNESS
[Disease: _____________________] : Disease: [unfilled] [T: ___] : T[unfilled] [N: ___] : N[unfilled] [M: ___] : M[unfilled] [de-identified] : Nishi Frazier is a 78y female  with h/o of DM, HTN, HLD who presented with crampy abdominal pain, persistent nausea, anorexia, and intermittent vomiting about two weeks and hospitalized in April, 2019 associated with 20lbs weight loss.  The patient had her first colonoscopy "a few years ago" by a Dr. Acevedo at [facility]. 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   She was supposed to get a repeat in 3 years. She denies any narrow-caliber stools, BRBPR and melena. \par \par 4/24/19: CT scan: Diffuse small bowel dilatation and fluid-filled distention of the colon to the level of the distal descending colon, possibly large bowel obstruction with incompetent ileocecal valve. Possible stricture at the level of the proximal sigmoid colon. \par \par Patient NPO with NG tube. GI performed a colonoscopy which showed diverticulosis. findings at colonoscopy seem to suggest a tight stricture which colonoscopy couldn't get thru. The possibility of a neoplasm still exists. Patient taken to the OR on 4/29 for sigmoidectomy and colostomy. 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 \par \par No CEA was checked prior to surgery. \par \par She was discharged to the rehab on May 6, out of rehab in [place].  She feels fine now, eats normally, states non healing abdominal wound 2/2 surgery still on vacuum drainage with a lot of improvement, denies nausea, vomiting and abdominal pain. She can walk but feels tired easily. \par \par  [de-identified] : poorly differentiated adenocarcinoma [de-identified] : 7/29/2019  C1D14 capecitabine 1650mg BID. She has a little fatigue, denies nausea, vomiting, abdominal pain, diarrhea, constipation. Her appetite is good. \par \par 9/6/2019  C3D11 capecitabine 1650mg BID. She feels fine, tolerates well, denies nausea, vomiting, abdominal pain, fatigue, diarrhea, constipation. Her appetite is good. \par \par 10/7/19 : C4  D21   capecitabine 1650 mg BID, she complains of a little of hair loss, feels fine, denies nausea, vomiting, abdominal pain, fatigue, diarrhea, constipation. Her appetite is good.

## 2019-10-16 ENCOUNTER — FORM ENCOUNTER (OUTPATIENT)
Age: 79
End: 2019-10-16

## 2019-10-17 ENCOUNTER — OUTPATIENT (OUTPATIENT)
Dept: OUTPATIENT SERVICES | Facility: HOSPITAL | Age: 79
LOS: 1 days | End: 2019-10-17
Payer: MEDICARE

## 2019-10-17 ENCOUNTER — APPOINTMENT (OUTPATIENT)
Dept: CT IMAGING | Facility: IMAGING CENTER | Age: 79
End: 2019-10-17
Payer: MEDICARE

## 2019-10-17 DIAGNOSIS — C18.7 MALIGNANT NEOPLASM OF SIGMOID COLON: ICD-10-CM

## 2019-10-17 DIAGNOSIS — Z79.899 OTHER LONG TERM (CURRENT) DRUG THERAPY: ICD-10-CM

## 2019-10-17 PROCEDURE — 71260 CT THORAX DX C+: CPT | Mod: 26

## 2019-10-17 PROCEDURE — 71260 CT THORAX DX C+: CPT

## 2019-10-17 PROCEDURE — 74177 CT ABD & PELVIS W/CONTRAST: CPT

## 2019-10-17 PROCEDURE — 74177 CT ABD & PELVIS W/CONTRAST: CPT | Mod: 26

## 2019-10-30 ENCOUNTER — RESULT REVIEW (OUTPATIENT)
Age: 79
End: 2019-10-30

## 2019-10-30 ENCOUNTER — APPOINTMENT (OUTPATIENT)
Dept: HEMATOLOGY ONCOLOGY | Facility: CLINIC | Age: 79
End: 2019-10-30
Payer: MEDICARE

## 2019-10-30 VITALS
SYSTOLIC BLOOD PRESSURE: 161 MMHG | WEIGHT: 158.73 LBS | BODY MASS INDEX: 28.3 KG/M2 | RESPIRATION RATE: 16 BRPM | HEART RATE: 85 BPM | DIASTOLIC BLOOD PRESSURE: 69 MMHG | OXYGEN SATURATION: 96 % | TEMPERATURE: 98.3 F

## 2019-10-30 DIAGNOSIS — K55.069 ACUTE INFARCTION OF INTESTINE, PART AND EXTENT UNSPECIFIED: ICD-10-CM

## 2019-10-30 LAB
BASOPHILS # BLD AUTO: 0 K/UL — SIGNIFICANT CHANGE UP (ref 0–0.2)
BASOPHILS NFR BLD AUTO: 0.8 % — SIGNIFICANT CHANGE UP (ref 0–2)
EOSINOPHIL # BLD AUTO: 0.2 K/UL — SIGNIFICANT CHANGE UP (ref 0–0.5)
EOSINOPHIL NFR BLD AUTO: 4.9 % — SIGNIFICANT CHANGE UP (ref 0–6)
HCT VFR BLD CALC: 31.8 % — LOW (ref 34.5–45)
HGB BLD-MCNC: 11.2 G/DL — LOW (ref 11.5–15.5)
LYMPHOCYTES # BLD AUTO: 2 K/UL — SIGNIFICANT CHANGE UP (ref 1–3.3)
LYMPHOCYTES # BLD AUTO: 42.2 % — SIGNIFICANT CHANGE UP (ref 13–44)
MCHC RBC-ENTMCNC: 35.3 G/DL — SIGNIFICANT CHANGE UP (ref 32–36)
MCHC RBC-ENTMCNC: 36.8 PG — HIGH (ref 27–34)
MCV RBC AUTO: 104 FL — HIGH (ref 80–100)
MONOCYTES # BLD AUTO: 0.4 K/UL — SIGNIFICANT CHANGE UP (ref 0–0.9)
MONOCYTES NFR BLD AUTO: 9.2 % — SIGNIFICANT CHANGE UP (ref 2–14)
NEUTROPHILS # BLD AUTO: 2 K/UL — SIGNIFICANT CHANGE UP (ref 1.8–7.4)
NEUTROPHILS NFR BLD AUTO: 42.9 % — LOW (ref 43–77)
PLATELET # BLD AUTO: 251 K/UL — SIGNIFICANT CHANGE UP (ref 150–400)
RBC # BLD: 3.05 M/UL — LOW (ref 3.8–5.2)
RBC # FLD: 18 % — HIGH (ref 10.3–14.5)
WBC # BLD: 4.7 K/UL — SIGNIFICANT CHANGE UP (ref 3.8–10.5)
WBC # FLD AUTO: 4.7 K/UL — SIGNIFICANT CHANGE UP (ref 3.8–10.5)

## 2019-10-30 PROCEDURE — 99215 OFFICE O/P EST HI 40 MIN: CPT

## 2019-10-30 NOTE — REVIEW OF SYSTEMS
[Negative] : Allergic/Immunologic [Fever] : no fever [Chills] : no chills [Night Sweats] : no night sweats [Fatigue] : no fatigue [Recent Change In Weight] : ~T no recent weight change [Lower Ext Edema] : no lower extremity edema [Suicidal] : not suicidal [Insomnia] : no insomnia [Anxiety] : no anxiety [Depression] : no depression [FreeTextEntry2] : 10lbs gain in one month [FreeTextEntry5] : b/l feet

## 2019-10-30 NOTE — HISTORY OF PRESENT ILLNESS
[Disease: _____________________] : Disease: [unfilled] [T: ___] : T[unfilled] [N: ___] : N[unfilled] [M: ___] : M[unfilled] [de-identified] : Nishi Frazier is a 78y female  with h/o of DM, HTN, HLD who presented with crampy abdominal pain, persistent nausea, anorexia, and intermittent vomiting about two weeks and hospitalized in April, 2019 associated with 20lbs weight loss.  The patient had her first colonoscopy "a few years ago" by a Dr. Acevedo at [facility]. 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   She was supposed to get a repeat in 3 years. She denies any narrow-caliber stools, BRBPR and melena. \par \par 4/24/19: CT scan: Diffuse small bowel dilatation and fluid-filled distention of the colon to the level of the distal descending colon, possibly large bowel obstruction with incompetent ileocecal valve. Possible stricture at the level of the proximal sigmoid colon. \par \par Patient NPO with NG tube. GI performed a colonoscopy which showed diverticulosis. findings at colonoscopy seem to suggest a tight stricture which colonoscopy couldn't get thru. The possibility of a neoplasm still exists. Patient taken to the OR on 4/29 for sigmoidectomy and colostomy. 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 \par \par No CEA was checked prior to surgery. \par \par She was discharged to the rehab on May 6, out of rehab in [place].  She feels fine now, eats normally, states non healing abdominal wound 2/2 surgery still on vacuum drainage with a lot of improvement, denies nausea, vomiting and abdominal pain. She can walk but feels tired easily. \par \par  [de-identified] : poorly differentiated adenocarcinoma [de-identified] : 7/29/2019  C1D14 capecitabine 1650mg BID. She has a little fatigue, denies nausea, vomiting, abdominal pain, diarrhea, constipation. Her appetite is good. \par \par 9/6/2019  C3D11 capecitabine 1650mg BID. She feels fine, tolerates well, denies nausea, vomiting, abdominal pain, fatigue, diarrhea, constipation. Her appetite is good. \par \par 10/7/19 : C4  D21   capecitabine 1650 mg BID, she complains of a little of hair loss, feels fine, denies nausea, vomiting, abdominal pain, fatigue, diarrhea, constipation. Her appetite is good. \par \par 10/7/2019 CT chest/abdomen/pelvis showed soft tissue density adjacent to site of colonic resection in the left lower quadrant suspicious for residual tumor. Consider PET/CT for correlation. Inferior mesenteric vein thrombus. \par \par 10/30/2019 C6D1   capecitabine 1650 mg BID, she complains of hair loss, feels fine, skin darkness, denies nausea, vomiting, abdominal pain, fatigue, diarrhea, constipation. Her appetite is good. He has tingling and numbness in the fingers and toes prior to chemotherapy.

## 2019-10-30 NOTE — PHYSICAL EXAM
[Restricted in physically strenuous activity but ambulatory and able to carry out work of a light or sedentary nature] : Status 1- Restricted in physically strenuous activity but ambulatory and able to carry out work of a light or sedentary nature, e.g., light house work, office work [Normal] : affect appropriate [de-identified] : surgical wound

## 2019-10-30 NOTE — ASSESSMENT
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 I had a discussion with Dr. Loredo, Pathologist. This neuroendocrine feature is not carcinoid or high grade small cell vs large cell. The morphology of cancer cells has ex-goblet cells in the colon. CT scans only showed the stricture in the proximal sigmoid colon, normal liver, no retroperitoneal lymphadenopathy. If scans show suspicious lesions the biopsy will be needed.  If scans are negative she has stage IIIc sigmoid colon cancer. She needs adjuvant chemotherapy. I also explained to her stage IIIc, locally advanced with very high local and distant recurrence rate.  The standard of adjuvant chemo is FOLFOX or XELOX. However, for older patients 70 or older adding oxaliplatin is controversial given the toxicities and increasing the mortality. I am planning to start her only on capecitabine 1000mg/m2 BID for 14 days on one week off. The potential AEs are, but not limited to, fatigue, nausea, vomiting, diarrhea, hand foot syndrome, the risk of infection, myelosuppression. She agreed to sign the consent. s/p cycle 5 capecitabine adjuvant chemotherapy. She complains of some hair loss, otherwise, tolerates well. I reviewed recent CT scans with the patient. She has a more prominent soft tissue density around laina pouch and inferior mesenteric thrombosis. \par \par Plan \par continue C6 capecitabine\par have PET CT to check a soft tissue density on CT scan \par start xarelto 15mg BID for the first 3 weeks, followed by 20mg daily for total 6 months b/o inferior mesenteric thrombosis\par check labs\par RTC in 3 weeks\par

## 2019-11-13 ENCOUNTER — FORM ENCOUNTER (OUTPATIENT)
Age: 79
End: 2019-11-13

## 2019-11-14 ENCOUNTER — APPOINTMENT (OUTPATIENT)
Dept: NUCLEAR MEDICINE | Facility: IMAGING CENTER | Age: 79
End: 2019-11-14
Payer: MEDICARE

## 2019-11-14 ENCOUNTER — OUTPATIENT (OUTPATIENT)
Dept: OUTPATIENT SERVICES | Facility: HOSPITAL | Age: 79
LOS: 1 days | End: 2019-11-14
Payer: MEDICARE

## 2019-11-14 DIAGNOSIS — C18.7 MALIGNANT NEOPLASM OF SIGMOID COLON: ICD-10-CM

## 2019-11-14 PROCEDURE — 78815 PET IMAGE W/CT SKULL-THIGH: CPT | Mod: 26,PS

## 2019-11-14 PROCEDURE — 78815 PET IMAGE W/CT SKULL-THIGH: CPT

## 2019-11-14 PROCEDURE — A9552: CPT

## 2019-11-18 ENCOUNTER — OUTPATIENT (OUTPATIENT)
Dept: OUTPATIENT SERVICES | Facility: HOSPITAL | Age: 79
LOS: 1 days | Discharge: HOME | End: 2019-11-18

## 2019-11-18 DIAGNOSIS — C18.9 MALIGNANT NEOPLASM OF COLON, UNSPECIFIED: ICD-10-CM

## 2019-11-18 LAB
ALBUMIN SERPL ELPH-MCNC: 4.3 G/DL
ALBUMIN SERPL ELPH-MCNC: 4.5 G/DL
ALBUMIN SERPL ELPH-MCNC: 4.6 G/DL
ALP BLD-CCNC: 45 U/L
ALP BLD-CCNC: 56 U/L
ALP BLD-CCNC: 64 U/L
ALT SERPL-CCNC: 10 U/L
ALT SERPL-CCNC: 9 U/L
ALT SERPL-CCNC: 9 U/L
ANION GAP SERPL CALC-SCNC: 14 MMOL/L
ANION GAP SERPL CALC-SCNC: 14 MMOL/L
ANION GAP SERPL CALC-SCNC: 15 MMOL/L
AST SERPL-CCNC: 13 U/L
AST SERPL-CCNC: 15 U/L
AST SERPL-CCNC: 17 U/L
BILIRUB SERPL-MCNC: 0.2 MG/DL
BILIRUB SERPL-MCNC: 0.4 MG/DL
BILIRUB SERPL-MCNC: <0.2 MG/DL
BUN SERPL-MCNC: 14 MG/DL
BUN SERPL-MCNC: 18 MG/DL
BUN SERPL-MCNC: 9 MG/DL
CALCIUM SERPL-MCNC: 9.7 MG/DL
CALCIUM SERPL-MCNC: 9.8 MG/DL
CALCIUM SERPL-MCNC: 9.9 MG/DL
CEA SERPL-MCNC: 2.8 NG/ML
CEA SERPL-MCNC: 2.9 NG/ML
CEA SERPL-MCNC: 3 NG/ML
CHLORIDE SERPL-SCNC: 101 MMOL/L
CHLORIDE SERPL-SCNC: 103 MMOL/L
CHLORIDE SERPL-SCNC: 99 MMOL/L
CO2 SERPL-SCNC: 24 MMOL/L
CO2 SERPL-SCNC: 25 MMOL/L
CO2 SERPL-SCNC: 25 MMOL/L
CREAT SERPL-MCNC: 0.75 MG/DL
CREAT SERPL-MCNC: 0.85 MG/DL
CREAT SERPL-MCNC: 0.91 MG/DL
GLUCOSE SERPL-MCNC: 100 MG/DL
GLUCOSE SERPL-MCNC: 118 MG/DL
GLUCOSE SERPL-MCNC: 98 MG/DL
HBV CORE IGG+IGM SER QL: NONREACTIVE
HBV CORE IGM SER QL: NONREACTIVE
HBV E AB SER QL: NEGATIVE
HBV E AG SER QL: NEGATIVE
HBV SURFACE AB SER QL: NONREACTIVE
HBV SURFACE AG SER QL: NONREACTIVE
HCV AB SER QL: NONREACTIVE
HCV S/CO RATIO: 0.15 S/CO
POTASSIUM SERPL-SCNC: 4.3 MMOL/L
POTASSIUM SERPL-SCNC: 4.7 MMOL/L
POTASSIUM SERPL-SCNC: 4.7 MMOL/L
PROT SERPL-MCNC: 7 G/DL
PROT SERPL-MCNC: 7.6 G/DL
PROT SERPL-MCNC: 7.6 G/DL
SODIUM SERPL-SCNC: 137 MMOL/L
SODIUM SERPL-SCNC: 141 MMOL/L
SODIUM SERPL-SCNC: 142 MMOL/L

## 2019-11-25 ENCOUNTER — RESULT REVIEW (OUTPATIENT)
Age: 79
End: 2019-11-25

## 2019-11-25 ENCOUNTER — APPOINTMENT (OUTPATIENT)
Dept: HEMATOLOGY ONCOLOGY | Facility: CLINIC | Age: 79
End: 2019-11-25
Payer: MEDICARE

## 2019-11-25 VITALS
TEMPERATURE: 98.4 F | RESPIRATION RATE: 18 BRPM | SYSTOLIC BLOOD PRESSURE: 153 MMHG | OXYGEN SATURATION: 96 % | WEIGHT: 162.04 LBS | BODY MASS INDEX: 28.89 KG/M2 | HEART RATE: 82 BPM | DIASTOLIC BLOOD PRESSURE: 75 MMHG

## 2019-11-25 LAB
BASOPHILS # BLD AUTO: 0.1 K/UL — SIGNIFICANT CHANGE UP (ref 0–0.2)
BASOPHILS NFR BLD AUTO: 1.9 % — SIGNIFICANT CHANGE UP (ref 0–2)
EOSINOPHIL # BLD AUTO: 0.1 K/UL — SIGNIFICANT CHANGE UP (ref 0–0.5)
EOSINOPHIL NFR BLD AUTO: 2.3 % — SIGNIFICANT CHANGE UP (ref 0–6)
HCT VFR BLD CALC: 31.9 % — LOW (ref 34.5–45)
HGB BLD-MCNC: 10.8 G/DL — LOW (ref 11.5–15.5)
LYMPHOCYTES # BLD AUTO: 2.1 K/UL — SIGNIFICANT CHANGE UP (ref 1–3.3)
LYMPHOCYTES # BLD AUTO: 37.5 % — SIGNIFICANT CHANGE UP (ref 13–44)
MCHC RBC-ENTMCNC: 33.9 G/DL — SIGNIFICANT CHANGE UP (ref 32–36)
MCHC RBC-ENTMCNC: 36.6 PG — HIGH (ref 27–34)
MCV RBC AUTO: 108 FL — HIGH (ref 80–100)
MONOCYTES # BLD AUTO: 0.2 K/UL — SIGNIFICANT CHANGE UP (ref 0–0.9)
MONOCYTES NFR BLD AUTO: 3.8 % — SIGNIFICANT CHANGE UP (ref 2–14)
NEUTROPHILS # BLD AUTO: 3.1 K/UL — SIGNIFICANT CHANGE UP (ref 1.8–7.4)
NEUTROPHILS NFR BLD AUTO: 54.6 % — SIGNIFICANT CHANGE UP (ref 43–77)
PLATELET # BLD AUTO: 233 K/UL — SIGNIFICANT CHANGE UP (ref 150–400)
RBC # BLD: 2.95 M/UL — LOW (ref 3.8–5.2)
RBC # FLD: 16.7 % — HIGH (ref 10.3–14.5)
WBC # BLD: 5.7 K/UL — SIGNIFICANT CHANGE UP (ref 3.8–10.5)
WBC # FLD AUTO: 5.7 K/UL — SIGNIFICANT CHANGE UP (ref 3.8–10.5)

## 2019-11-25 PROCEDURE — 99215 OFFICE O/P EST HI 40 MIN: CPT

## 2019-11-25 PROCEDURE — 99214 OFFICE O/P EST MOD 30 MIN: CPT

## 2019-12-01 NOTE — HISTORY OF PRESENT ILLNESS
[Disease: _____________________] : Disease: [unfilled] [T: ___] : T[unfilled] [N: ___] : N[unfilled] [M: ___] : M[unfilled] [de-identified] : Nishi Frazier is a 78y female  with h/o of DM, HTN, HLD who presented with crampy abdominal pain, persistent nausea, anorexia, and intermittent vomiting about two weeks and hospitalized in April, 2019 associated with 20lbs weight loss.  The patient had her first colonoscopy "a few years ago" by a Dr. Acevedo at [facility]. 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   She was supposed to get a repeat in 3 years. She denies any narrow-caliber stools, BRBPR and melena. \par \par 4/24/19: CT scan: Diffuse small bowel dilatation and fluid-filled distention of the colon to the level of the distal descending colon, possibly large bowel obstruction with incompetent ileocecal valve. Possible stricture at the level of the proximal sigmoid colon. \par \par Patient NPO with NG tube. GI performed a colonoscopy which showed diverticulosis. findings at colonoscopy seem to suggest a tight stricture which colonoscopy couldn't get thru. The possibility of a neoplasm still exists. Patient taken to the OR on 4/29 for sigmoidectomy and colostomy. 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 \par \par No CEA was checked prior to surgery. \par \par She was discharged to the rehab on May 6, out of rehab in [place].  She feels fine now, eats normally, states non healing abdominal wound 2/2 surgery still on vacuum drainage with a lot of improvement, denies nausea, vomiting and abdominal pain. She can walk but feels tired easily. \par \par  [de-identified] : poorly differentiated adenocarcinoma [de-identified] : 7/29/2019  C1D14 capecitabine 1650mg BID. She has a little fatigue, denies nausea, vomiting, abdominal pain, diarrhea, constipation. Her appetite is good. \par \par 9/6/2019  C3D11 capecitabine 1650mg BID. She feels fine, tolerates well, denies nausea, vomiting, abdominal pain, fatigue, diarrhea, constipation. Her appetite is good. \par \par 10/7/19 : C4  D21   capecitabine 1650 mg BID, she complains of a little of hair loss, feels fine, denies nausea, vomiting, abdominal pain, fatigue, diarrhea, constipation. Her appetite is good. \par \par 10/7/2019 CT chest/abdomen/pelvis showed soft tissue density adjacent to site of colonic resection in the left lower quadrant suspicious for residual tumor. Consider PET/CT for correlation. Inferior mesenteric vein thrombus. \par \par 10/30/2019 C6D1   capecitabine 1650 mg BID, she complains of hair loss, feels fine, skin darkness, denies nausea, vomiting, abdominal pain, fatigue, diarrhea, constipation. Her appetite is good. He has tingling and numbness in the fingers and toes prior to chemotherapy. \par \par 11/25/2019 C7DD8 capecitabine 1650mg BID, complains of both hand and feet pain, skin pigmentation. She has loose stool x4 from colostomy bag. She denies nausea, vomiting, abdominal pain and fatigue. Her appetite is good.

## 2019-12-01 NOTE — CONSULT LETTER
[Dear  ___] : Dear  [unfilled], [Consult Letter:] : I had the pleasure of evaluating your patient, [unfilled]. [Please see my note below.] : Please see my note below. [Sincerely,] : Sincerely, [Consult Closing:] : Thank you very much for allowing me to participate in the care of this patient.  If you have any questions, please do not hesitate to contact me. [FreeTextEntry3] : Debbie Jeter MD

## 2019-12-01 NOTE — HISTORY OF PRESENT ILLNESS
[Disease: _____________________] : Disease: [unfilled] [T: ___] : T[unfilled] [N: ___] : N[unfilled] [M: ___] : M[unfilled] [de-identified] : Nishi Frazier is a 78y female  with h/o of DM, HTN, HLD who presented with crampy abdominal pain, persistent nausea, anorexia, and intermittent vomiting about two weeks and hospitalized in April, 2019 associated with 20lbs weight loss.  The patient had her first colonoscopy "a few years ago" by a Dr. Acevedo at [facility]. 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   She was supposed to get a repeat in 3 years. She denies any narrow-caliber stools, BRBPR and melena. \par \par 4/24/19: CT scan: Diffuse small bowel dilatation and fluid-filled distention of the colon to the level of the distal descending colon, possibly large bowel obstruction with incompetent ileocecal valve. Possible stricture at the level of the proximal sigmoid colon. \par \par Patient NPO with NG tube. GI performed a colonoscopy which showed diverticulosis. findings at colonoscopy seem to suggest a tight stricture which colonoscopy couldn't get thru. The possibility of a neoplasm still exists. Patient taken to the OR on 4/29 for sigmoidectomy and colostomy. 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 \par \par No CEA was checked prior to surgery. \par \par She was discharged to the rehab on May 6, out of rehab in [place].  She feels fine now, eats normally, states non healing abdominal wound 2/2 surgery still on vacuum drainage with a lot of improvement, denies nausea, vomiting and abdominal pain. She can walk but feels tired easily. \par \par  [de-identified] : poorly differentiated adenocarcinoma [de-identified] : 7/29/2019  C1D14 capecitabine 1650mg BID. She has a little fatigue, denies nausea, vomiting, abdominal pain, diarrhea, constipation. Her appetite is good. \par \par 9/6/2019  C3D11 capecitabine 1650mg BID. She feels fine, tolerates well, denies nausea, vomiting, abdominal pain, fatigue, diarrhea, constipation. Her appetite is good. \par \par 10/7/19 : C4  D21   capecitabine 1650 mg BID, she complains of a little of hair loss, feels fine, denies nausea, vomiting, abdominal pain, fatigue, diarrhea, constipation. Her appetite is good. \par \par 10/7/2019 CT chest/abdomen/pelvis showed soft tissue density adjacent to site of colonic resection in the left lower quadrant suspicious for residual tumor. Consider PET/CT for correlation. Inferior mesenteric vein thrombus. \par \par 10/30/2019 C6D1   capecitabine 1650 mg BID, she complains of hair loss, feels fine, skin darkness, denies nausea, vomiting, abdominal pain, fatigue, diarrhea, constipation. Her appetite is good. He has tingling and numbness in the fingers and toes prior to chemotherapy. \par \par 11/25/2019 C7DD8 capecitabine 1650mg BID, complains of both hand and feet pain, skin pigmentation. She has loose stool x4 from colostomy bag. She denies nausea, vomiting, abdominal pain and fatigue. Her appetite is good.

## 2019-12-01 NOTE — PHYSICAL EXAM
[Restricted in physically strenuous activity but ambulatory and able to carry out work of a light or sedentary nature] : Status 1- Restricted in physically strenuous activity but ambulatory and able to carry out work of a light or sedentary nature, e.g., light house work, office work [Normal] : affect appropriate [de-identified] : surgical wound

## 2019-12-01 NOTE — ASSESSMENT
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 I had a discussion with Dr. Loredo, Pathologist. This neuroendocrine feature is not carcinoid or high grade small cell vs large cell. The morphology of cancer cells has ex-goblet cells in the colon. CT scans only showed the stricture in the proximal sigmoid colon, normal liver, no retroperitoneal lymphadenopathy. If scans show suspicious lesions the biopsy will be needed.  If scans are negative she has stage IIIc sigmoid colon cancer. She needs adjuvant chemotherapy. I also explained to her stage IIIc, locally advanced with very high local and distant recurrence rate.  The standard of adjuvant chemo is FOLFOX or XELOX. However, for older patients 70 or older adding oxaliplatin is controversial given the toxicities and increasing the mortality. I am planning to start her only on capecitabine 1000mg/m2 BID for 14 days on one week off. The potential AEs are, but not limited to, fatigue, nausea, vomiting, diarrhea, hand foot syndrome, the risk of infection, myelosuppression. She agreed to sign the consent. s/p cycle 5 capecitabine adjuvant chemotherapy. She complains of some hair loss, otherwise, tolerates well. I reviewed recent CT scans with the patient. She has a more prominent soft tissue density around laina pouch and inferior mesenteric thrombosis. MRI abdomen showed a prominent soft tissue density at Laina pouch for possible infection/inflammation or cancer recurrence. \par \par Plan \par continue C8 capecitabine\par will follow up CT scan in 2 months for a prominent soft tissue density at Laina pouch\par continue xarelto 15mg BID for the first 3 weeks, followed by 20mg daily for total 6 months b/o inferior mesenteric thrombosis\par check labs\par RTC in 3 weeks\par

## 2019-12-01 NOTE — PHYSICAL EXAM
[Restricted in physically strenuous activity but ambulatory and able to carry out work of a light or sedentary nature] : Status 1- Restricted in physically strenuous activity but ambulatory and able to carry out work of a light or sedentary nature, e.g., light house work, office work [Normal] : affect appropriate [de-identified] : surgical wound

## 2019-12-12 ENCOUNTER — OUTPATIENT (OUTPATIENT)
Dept: OUTPATIENT SERVICES | Facility: HOSPITAL | Age: 79
LOS: 1 days | Discharge: HOME | End: 2019-12-12

## 2019-12-12 DIAGNOSIS — C18.9 MALIGNANT NEOPLASM OF COLON, UNSPECIFIED: ICD-10-CM

## 2019-12-19 ENCOUNTER — RESULT REVIEW (OUTPATIENT)
Age: 79
End: 2019-12-19

## 2019-12-19 ENCOUNTER — APPOINTMENT (OUTPATIENT)
Dept: HEMATOLOGY ONCOLOGY | Facility: CLINIC | Age: 79
End: 2019-12-19
Payer: MEDICARE

## 2019-12-19 VITALS
DIASTOLIC BLOOD PRESSURE: 70 MMHG | BODY MASS INDEX: 29.28 KG/M2 | OXYGEN SATURATION: 99 % | HEART RATE: 80 BPM | TEMPERATURE: 98.3 F | WEIGHT: 164.24 LBS | SYSTOLIC BLOOD PRESSURE: 130 MMHG | RESPIRATION RATE: 18 BRPM

## 2019-12-19 LAB
ALBUMIN SERPL ELPH-MCNC: 4.4 G/DL
ALP BLD-CCNC: 52 U/L
ALT SERPL-CCNC: 17 U/L
ANION GAP SERPL CALC-SCNC: 14 MMOL/L
AST SERPL-CCNC: 15 U/L
BASOPHILS # BLD AUTO: 0 K/UL — SIGNIFICANT CHANGE UP (ref 0–0.2)
BASOPHILS NFR BLD AUTO: 0.7 % — SIGNIFICANT CHANGE UP (ref 0–2)
BILIRUB SERPL-MCNC: 0.2 MG/DL
BUN SERPL-MCNC: 13 MG/DL
CALCIUM SERPL-MCNC: 9.5 MG/DL
CEA SERPL-MCNC: 2.3 NG/ML
CHLORIDE SERPL-SCNC: 103 MMOL/L
CO2 SERPL-SCNC: 26 MMOL/L
CREAT SERPL-MCNC: 0.97 MG/DL
EOSINOPHIL # BLD AUTO: 0.2 K/UL — SIGNIFICANT CHANGE UP (ref 0–0.5)
EOSINOPHIL NFR BLD AUTO: 3.1 % — SIGNIFICANT CHANGE UP (ref 0–6)
GLUCOSE SERPL-MCNC: 109 MG/DL
HCT VFR BLD CALC: 32.3 % — LOW (ref 34.5–45)
HGB BLD-MCNC: 10.9 G/DL — LOW (ref 11.5–15.5)
LYMPHOCYTES # BLD AUTO: 3 K/UL — SIGNIFICANT CHANGE UP (ref 1–3.3)
LYMPHOCYTES # BLD AUTO: 43.7 % — SIGNIFICANT CHANGE UP (ref 13–44)
MCHC RBC-ENTMCNC: 33.8 G/DL — SIGNIFICANT CHANGE UP (ref 32–36)
MCHC RBC-ENTMCNC: 37 PG — HIGH (ref 27–34)
MCV RBC AUTO: 110 FL — HIGH (ref 80–100)
MONOCYTES # BLD AUTO: 0.4 K/UL — SIGNIFICANT CHANGE UP (ref 0–0.9)
MONOCYTES NFR BLD AUTO: 5.2 % — SIGNIFICANT CHANGE UP (ref 2–14)
NEUTROPHILS # BLD AUTO: 3.3 K/UL — SIGNIFICANT CHANGE UP (ref 1.8–7.4)
NEUTROPHILS NFR BLD AUTO: 47.3 % — SIGNIFICANT CHANGE UP (ref 43–77)
PLATELET # BLD AUTO: 285 K/UL — SIGNIFICANT CHANGE UP (ref 150–400)
POTASSIUM SERPL-SCNC: 4.3 MMOL/L
PROT SERPL-MCNC: 6.9 G/DL
RBC # BLD: 2.95 M/UL — LOW (ref 3.8–5.2)
RBC # FLD: 15.4 % — HIGH (ref 10.3–14.5)
SODIUM SERPL-SCNC: 143 MMOL/L
WBC # BLD: 6.9 K/UL — SIGNIFICANT CHANGE UP (ref 3.8–10.5)
WBC # FLD AUTO: 6.9 K/UL — SIGNIFICANT CHANGE UP (ref 3.8–10.5)

## 2019-12-19 PROCEDURE — 99214 OFFICE O/P EST MOD 30 MIN: CPT

## 2019-12-29 LAB
ALBUMIN SERPL ELPH-MCNC: 4.4 G/DL
ALP BLD-CCNC: 52 U/L
ALT SERPL-CCNC: 8 U/L
ANION GAP SERPL CALC-SCNC: 15 MMOL/L
AST SERPL-CCNC: 16 U/L
BILIRUB SERPL-MCNC: 0.3 MG/DL
BUN SERPL-MCNC: 21 MG/DL
CALCIUM SERPL-MCNC: 9.9 MG/DL
CEA SERPL-MCNC: 2.3 NG/ML
CHLORIDE SERPL-SCNC: 102 MMOL/L
CO2 SERPL-SCNC: 22 MMOL/L
CREAT SERPL-MCNC: 1.04 MG/DL
GLUCOSE SERPL-MCNC: 95 MG/DL
POTASSIUM SERPL-SCNC: 4.2 MMOL/L
PROT SERPL-MCNC: 7.1 G/DL
SODIUM SERPL-SCNC: 139 MMOL/L

## 2019-12-29 NOTE — ASSESSMENT
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 I had a discussion with Dr. Loredo, Pathologist. This neuroendocrine feature is not carcinoid or high grade small cell vs large cell. The morphology of cancer cells has ex-goblet cells in the colon. CT scans only showed the stricture in the proximal sigmoid colon, normal liver, no retroperitoneal lymphadenopathy. If scans show suspicious lesions the biopsy will be needed.  If scans are negative she has stage IIIc sigmoid colon cancer. She needs adjuvant chemotherapy. I also explained to her stage IIIc, locally advanced with very high local and distant recurrence rate.  The standard of adjuvant chemo is FOLFOX or XELOX. However, for older patients 70 or older adding oxaliplatin is controversial given the toxicities and increasing the mortality. I am planning to start her only on capecitabine 1000mg/m2 BID for 14 days on one week off. The potential AEs are, but not limited to, fatigue, nausea, vomiting, diarrhea, hand foot syndrome, the risk of infection, myelosuppression. She agreed to sign the consent. s/p cycle 5 capecitabine adjuvant chemotherapy. She complains of some hair loss, otherwise, tolerates well. I reviewed recent CT scans with the patient. She has a more prominent soft tissue density around laina pouch and inferior mesenteric thrombosis. 11/14/19 : MRI abdomen showed a prominent soft tissue density at Laina pouch for possible infection/inflammation or cancer recurrence. \par \par Plan \par Continue C8 capecitabine and complete 8 cycles. \par Follow up CT scan in 2 months from her last scan for a prominent soft tissue density at Laina pouch\par Continue xarelto 15mg BID for the first 3 weeks, followed by 20mg daily for total 6 months b/o inferior mesenteric thrombosis\par Check labs today\par \par RTC in 3 weeks for follow up. \par \par Rosio Peterson, ANP-BC

## 2019-12-29 NOTE — HISTORY OF PRESENT ILLNESS
[T: ___] : T[unfilled] [Disease: _____________________] : Disease: [unfilled] [M: ___] : M[unfilled] [N: ___] : N[unfilled] [Therapy: ___] : Therapy: [unfilled] [de-identified] : Nishi Frazier is a 79y female  with h/o of DM, HTN, HLD who presented with crampy abdominal pain, persistent nausea, anorexia, and intermittent vomiting about two weeks and hospitalized in April, 2019 associated with 20lbs weight loss.  The patient had her first colonoscopy "a few years ago" by a Dr. Acevedo at [facility]. 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   She was supposed to get a repeat in 3 years. She denies any narrow-caliber stools, BRBPR and melena. \par \par 4/24/19: CT scan: Diffuse small bowel dilatation and fluid-filled distention of the colon to the level of the distal descending colon, possibly large bowel obstruction with incompetent ileocecal valve. Possible stricture at the level of the proximal sigmoid colon. \par \par Patient NPO with NG tube. GI performed a colonoscopy which showed diverticulosis. findings at colonoscopy seem to suggest a tight stricture which colonoscopy couldn't get thru. The possibility of a neoplasm still exists. Patient taken to the OR on 4/29 for sigmoidectomy and colostomy. 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 \par \par No CEA was checked prior to surgery. \par \par 7/5/2019: She was discharged to the rehab on May 6, out of rehab in [place].  She feels fine now, eats normally, states non healing abdominal wound 2/2 surgery still on vacuum drainage with a lot of improvement, denies nausea, vomiting and abdominal pain. She can walk but feels tired easily. \par \par 7/29/2019  C1D14 capecitabine 1650mg BID. She has a little fatigue, denies nausea, vomiting, abdominal pain, diarrhea, constipation. Her appetite is good. \par \par 9/6/2019  C3D11 capecitabine 1650mg BID. She feels fine, tolerates well, denies nausea, vomiting, abdominal pain, fatigue, diarrhea, constipation. Her appetite is good. \par \par 10/7/19 : C4  D21   capecitabine 1650 mg BID, she complains of a little of hair loss, feels fine, denies nausea, vomiting, abdominal pain, fatigue, diarrhea, constipation. Her appetite is good. \par \par 10/7/2019 CT chest/abdomen/pelvis showed soft tissue density adjacent to site of colonic resection in the left lower quadrant suspicious for residual tumor. Consider PET/CT for correlation. Inferior mesenteric vein thrombus. \par \par 10/30/2019 C6D1   capecitabine 1650 mg BID, she complains of hair loss, feels fine, skin darkness, denies nausea, vomiting, abdominal pain, fatigue, diarrhea, constipation. Her appetite is good. He has tingling and numbness in the fingers and toes prior to chemotherapy. \par \par 11/25/2019 C7DD8 capecitabine 1650mg BID, complains of both hand and feet pain, skin pigmentation. She has loose stool x4 from colostomy bag. She denies nausea, vomiting, abdominal pain and fatigue. Her appetite is good.  [de-identified] : 12/19/19 : Cycle 8 Day 10. Had abd cramps and nausea, resolved in a day and her daughter had the same problem. She missed a dose of capecitabine that day. Fatigue at the end of the day but not that bothersome.  [de-identified] : poorly differentiated adenocarcinoma

## 2019-12-29 NOTE — REVIEW OF SYSTEMS
[Negative] : Allergic/Immunologic [Chills] : no chills [Fever] : no fever [Fatigue] : no fatigue [Night Sweats] : no night sweats [Recent Change In Weight] : ~T no recent weight change [Suicidal] : not suicidal [Lower Ext Edema] : no lower extremity edema [Insomnia] : no insomnia [Depression] : no depression [Anxiety] : no anxiety [FreeTextEntry2] : 10lbs gain in one month [FreeTextEntry5] : b/l feet

## 2019-12-29 NOTE — PHYSICAL EXAM
[Restricted in physically strenuous activity but ambulatory and able to carry out work of a light or sedentary nature] : Status 1- Restricted in physically strenuous activity but ambulatory and able to carry out work of a light or sedentary nature, e.g., light house work, office work [Normal] : affect appropriate [de-identified] : surgical wound

## 2020-01-06 ENCOUNTER — RESULT REVIEW (OUTPATIENT)
Age: 80
End: 2020-01-06

## 2020-01-06 ENCOUNTER — APPOINTMENT (OUTPATIENT)
Dept: HEMATOLOGY ONCOLOGY | Facility: CLINIC | Age: 80
End: 2020-01-06
Payer: MEDICARE

## 2020-01-06 VITALS
BODY MASS INDEX: 27.91 KG/M2 | RESPIRATION RATE: 14 BRPM | DIASTOLIC BLOOD PRESSURE: 69 MMHG | HEART RATE: 76 BPM | SYSTOLIC BLOOD PRESSURE: 135 MMHG | WEIGHT: 156.53 LBS | TEMPERATURE: 98 F | OXYGEN SATURATION: 96 %

## 2020-01-06 DIAGNOSIS — Z79.899 OTHER LONG TERM (CURRENT) DRUG THERAPY: ICD-10-CM

## 2020-01-06 DIAGNOSIS — R53.83 OTHER FATIGUE: ICD-10-CM

## 2020-01-06 LAB
BASOPHILS # BLD AUTO: 0.1 K/UL — SIGNIFICANT CHANGE UP (ref 0–0.2)
BASOPHILS NFR BLD AUTO: 1.6 % — SIGNIFICANT CHANGE UP (ref 0–2)
CEA SERPL-MCNC: 2.2 NG/ML
EOSINOPHIL # BLD AUTO: 0.2 K/UL — SIGNIFICANT CHANGE UP (ref 0–0.5)
EOSINOPHIL NFR BLD AUTO: 3.1 % — SIGNIFICANT CHANGE UP (ref 0–6)
HCT VFR BLD CALC: 33.7 % — LOW (ref 34.5–45)
HGB BLD-MCNC: 11.7 G/DL — SIGNIFICANT CHANGE UP (ref 11.5–15.5)
LYMPHOCYTES # BLD AUTO: 1.8 K/UL — SIGNIFICANT CHANGE UP (ref 1–3.3)
LYMPHOCYTES # BLD AUTO: 35.1 % — SIGNIFICANT CHANGE UP (ref 13–44)
MCHC RBC-ENTMCNC: 34.6 G/DL — SIGNIFICANT CHANGE UP (ref 32–36)
MCHC RBC-ENTMCNC: 37.8 PG — HIGH (ref 27–34)
MCV RBC AUTO: 109 FL — HIGH (ref 80–100)
MONOCYTES # BLD AUTO: 0.4 K/UL — SIGNIFICANT CHANGE UP (ref 0–0.9)
MONOCYTES NFR BLD AUTO: 7.9 % — SIGNIFICANT CHANGE UP (ref 2–14)
NEUTROPHILS # BLD AUTO: 2.6 K/UL — SIGNIFICANT CHANGE UP (ref 1.8–7.4)
NEUTROPHILS NFR BLD AUTO: 52.3 % — SIGNIFICANT CHANGE UP (ref 43–77)
PLATELET # BLD AUTO: 274 K/UL — SIGNIFICANT CHANGE UP (ref 150–400)
RBC # BLD: 3.08 M/UL — LOW (ref 3.8–5.2)
RBC # FLD: 15.3 % — HIGH (ref 10.3–14.5)
WBC # BLD: 5.1 K/UL — SIGNIFICANT CHANGE UP (ref 3.8–10.5)
WBC # FLD AUTO: 5.1 K/UL — SIGNIFICANT CHANGE UP (ref 3.8–10.5)

## 2020-01-06 PROCEDURE — 99214 OFFICE O/P EST MOD 30 MIN: CPT

## 2020-01-06 RX ORDER — CAPECITABINE 150 MG/1
150 TABLET, FILM COATED ORAL
Qty: 28 | Refills: 7 | Status: COMPLETED | COMMUNITY
Start: 2019-07-15 | End: 2020-01-06

## 2020-01-06 RX ORDER — CAPECITABINE 500 MG/1
500 TABLET ORAL TWICE DAILY
Qty: 84 | Refills: 7 | Status: COMPLETED | COMMUNITY
Start: 2019-07-15 | End: 2020-01-06

## 2020-01-06 NOTE — ASSESSMENT
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 I had a discussion with Dr. Loredo, Pathologist. This neuroendocrine feature is not carcinoid or high grade small cell vs large cell. The morphology of cancer cells has ex-goblet cells in the colon. CT scans only showed the stricture in the proximal sigmoid colon, normal liver, no retroperitoneal lymphadenopathy. If scans show suspicious lesions the biopsy will be needed.  If scans are negative she has stage IIIc sigmoid colon cancer. She needs adjuvant chemotherapy. I also explained to her stage IIIc, locally advanced with very high local and distant recurrence rate.  The standard of adjuvant chemo is FOLFOX or XELOX. However, for older patients 70 or older adding oxaliplatin is controversial given the toxicities and increasing the mortality. I am planning to start her only on capecitabine 1000mg/m2 BID for 14 days on one week off. The potential AEs are, but not limited to, fatigue, nausea, vomiting, diarrhea, hand foot syndrome, the risk of infection, myelosuppression. She agreed to sign the consent. s/p cycle 5 capecitabine adjuvant chemotherapy. She complains of some hair loss, otherwise, tolerates well. We reviewed recent CT scans with the patient. She has a more prominent soft tissue density around laina pouch and inferior mesenteric thrombosis. 11/14/19 : MRI abdomen showed a prominent soft tissue density at Laina pouch for possible infection/inflammation or cancer recurrence. \par \par Plan \par Completed 8 cycles of adjuvant therapy. \par RTC 3 months with torso CT. \par Labs today\par Continue xarelto 15mg BID for the first 3 weeks, followed by 20mg daily for total 6 months b/o inferior mesenteric thrombosis\par \par \par RTC in 3 months for follow up. \par \par Rosio Peterson ANP-BC

## 2020-01-06 NOTE — HISTORY OF PRESENT ILLNESS
[Disease: _____________________] : Disease: [unfilled] [T: ___] : T[unfilled] [N: ___] : N[unfilled] [M: ___] : M[unfilled] [Therapy: ___] : Therapy: [unfilled] [AJCC Stage: ____] : AJCC Stage: [unfilled] [de-identified] : poorly differentiated adenocarcinoma [de-identified] : Nishi Frazier is a 79y female  with h/o of DM, HTN, HLD who presented with crampy abdominal pain, persistent nausea, anorexia, and intermittent vomiting about two weeks and hospitalized in April, 2019 associated with 20lbs weight loss.  The patient had her first colonoscopy "a few years ago" by a Dr. Acevedo at [facility]. 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   She was supposed to get a repeat in 3 years. She denies any narrow-caliber stools, BRBPR and melena. \par \par 4/24/19: CT scan: Diffuse small bowel dilatation and fluid-filled distention of the colon to the level of the distal descending colon, possibly large bowel obstruction with incompetent ileocecal valve. Possible stricture at the level of the proximal sigmoid colon. \par \par Patient NPO with NG tube. GI performed a colonoscopy which showed diverticulosis. findings at colonoscopy seem to suggest a tight stricture which colonoscopy couldn't get thru. The possibility of a neoplasm still exists. Patient taken to the OR on 4/29 for sigmoidectomy and colostomy. 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 \par \par No CEA was checked prior to surgery. \par \par 7/5/2019: She was discharged to the rehab on May 6, out of rehab in [place].  She feels fine now, eats normally, states non healing abdominal wound 2/2 surgery still on vacuum drainage with a lot of improvement, denies nausea, vomiting and abdominal pain. She can walk but feels tired easily. \par \par 7/29/2019  C1D14 capecitabine 1650mg BID. She has a little fatigue, denies nausea, vomiting, abdominal pain, diarrhea, constipation. Her appetite is good. \par \par 9/6/2019  C3D11 capecitabine 1650mg BID. She feels fine, tolerates well, denies nausea, vomiting, abdominal pain, fatigue, diarrhea, constipation. Her appetite is good. \par \par 10/7/19 : C4  D21   capecitabine 1650 mg BID, she complains of a little of hair loss, feels fine, denies nausea, vomiting, abdominal pain, fatigue, diarrhea, constipation. Her appetite is good. \par \par 10/7/2019 CT chest/abdomen/pelvis showed soft tissue density adjacent to site of colonic resection in the left lower quadrant suspicious for residual tumor. Consider PET/CT for correlation. Inferior mesenteric vein thrombus. \par \par 10/30/2019 C6D1   capecitabine 1650 mg BID, she complains of hair loss, feels fine, skin darkness, denies nausea, vomiting, abdominal pain, fatigue, diarrhea, constipation. Her appetite is good. He has tingling and numbness in the fingers and toes prior to chemotherapy. \par \par 11/25/2019 C7DD8 capecitabine 1650mg BID, complains of both hand and feet pain, skin pigmentation. She has loose stool x4 from colostomy bag. She denies nausea, vomiting, abdominal pain and fatigue. Her appetite is good. \par \par 12/19/19 : Cycle 8 Day 10. Had abd cramps and nausea, resolved in a day and her daughter had the same problem. She missed a dose of capecitabine that day. Fatigue at the end of the day but not that bothersome.  [de-identified] : 1/6/2020 : s/p completion of 8 cycles of capecitabine. Feels well but lost 3.5 kg since her last visit 3 weeks ago. \par No fevers, headache, sob, cp, nvd. Had an accident with colostomy.

## 2020-01-06 NOTE — PHYSICAL EXAM
[Restricted in physically strenuous activity but ambulatory and able to carry out work of a light or sedentary nature] : Status 1- Restricted in physically strenuous activity but ambulatory and able to carry out work of a light or sedentary nature, e.g., light house work, office work [Normal] : grossly intact [de-identified] : surgical wound

## 2020-01-06 NOTE — REVIEW OF SYSTEMS
[Negative] : Allergic/Immunologic [Night Sweats] : no night sweats [Fever] : no fever [Chills] : no chills [Lower Ext Edema] : no lower extremity edema [Recent Change In Weight] : ~T no recent weight change [Fatigue] : no fatigue [Suicidal] : not suicidal [Insomnia] : no insomnia [Anxiety] : no anxiety [Depression] : no depression [FreeTextEntry5] : b/l feet

## 2020-01-07 LAB
ALBUMIN SERPL ELPH-MCNC: 4.6 G/DL
ALP BLD-CCNC: 52 U/L
ALT SERPL-CCNC: 9 U/L
ANION GAP SERPL CALC-SCNC: 16 MMOL/L
AST SERPL-CCNC: 15 U/L
BILIRUB SERPL-MCNC: 0.4 MG/DL
BUN SERPL-MCNC: 22 MG/DL
CALCIUM SERPL-MCNC: 10.2 MG/DL
CHLORIDE SERPL-SCNC: 100 MMOL/L
CO2 SERPL-SCNC: 24 MMOL/L
CREAT SERPL-MCNC: 0.98 MG/DL
GLUCOSE SERPL-MCNC: 86 MG/DL
POTASSIUM SERPL-SCNC: 4.4 MMOL/L
PROT SERPL-MCNC: 7.3 G/DL
SODIUM SERPL-SCNC: 140 MMOL/L

## 2020-04-02 ENCOUNTER — OUTPATIENT (OUTPATIENT)
Dept: OUTPATIENT SERVICES | Facility: HOSPITAL | Age: 80
LOS: 1 days | Discharge: HOME | End: 2020-04-02

## 2020-04-02 DIAGNOSIS — C18.9 MALIGNANT NEOPLASM OF COLON, UNSPECIFIED: ICD-10-CM

## 2020-04-08 ENCOUNTER — APPOINTMENT (OUTPATIENT)
Dept: HEMATOLOGY ONCOLOGY | Facility: CLINIC | Age: 80
End: 2020-04-08

## 2020-04-08 ENCOUNTER — APPOINTMENT (OUTPATIENT)
Dept: HEMATOLOGY ONCOLOGY | Facility: CLINIC | Age: 80
End: 2020-04-08
Payer: MEDICARE

## 2020-04-08 PROCEDURE — 99213 OFFICE O/P EST LOW 20 MIN: CPT | Mod: 95

## 2020-04-08 RX ORDER — RIVAROXABAN 15 MG/1
15 TABLET, FILM COATED ORAL
Qty: 42 | Refills: 0 | Status: ACTIVE | COMMUNITY
Start: 2019-10-30 | End: 1900-01-01

## 2020-04-08 RX ORDER — RIVAROXABAN 20 MG/1
20 TABLET, FILM COATED ORAL
Qty: 30 | Refills: 3 | Status: ACTIVE | COMMUNITY
Start: 2020-04-08 | End: 1900-01-01

## 2020-04-08 NOTE — ASSESSMENT
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 I had a discussion with Dr. Loredo, Pathologist. This neuroendocrine feature is not carcinoid or high grade small cell vs large cell. The morphology of cancer cells has ex-goblet cells in the colon. CT scans only showed the stricture in the proximal sigmoid colon, normal liver, no retroperitoneal lymphadenopathy. If scans show suspicious lesions the biopsy will be needed.  If scans are negative she has stage IIIc sigmoid colon cancer. She needs adjuvant chemotherapy. I also explained to her stage IIIc, locally advanced with very high local and distant recurrence rate.  The standard of adjuvant chemo is FOLFOX or XELOX. However, for older patients 70 or older adding oxaliplatin is controversial given the toxicities and increasing the mortality. I am planning to start her only on capecitabine 1000mg/m2 BID for 14 days on one week off. The potential AEs are, but not limited to, fatigue, nausea, vomiting, diarrhea, hand foot syndrome, the risk of infection, myelosuppression. She agreed to sign the consent. s/p cycle 5 capecitabine adjuvant chemotherapy. She complains of some hair loss, otherwise, tolerates well. We reviewed recent CT scans with the patient. She has a more prominent soft tissue density around laina pouch and inferior mesenteric thrombosis. 11/14/19 : MRI abdomen showed a prominent soft tissue density at Laina pouch for possible infection/inflammation or cancer recurrence. \par \par Plan \par agree no interval scans and lab today now given virus pandemic until virus is resolved\par Continue xarelto 15mg BID for the first 3 weeks, followed by 20mg daily for total 6 months b/o inferior mesenteric thrombosis\par \par \par RTC in 3 months for follow up. \par

## 2020-04-08 NOTE — HISTORY OF PRESENT ILLNESS
[de-identified] : Nishi Frazier is a 79y female  with h/o of DM, HTN, HLD who presented with crampy abdominal pain, persistent nausea, anorexia, and intermittent vomiting about two weeks and hospitalized in April, 2019 associated with 20lbs weight loss.  The patient had her first colonoscopy "a few years ago" by a Dr. Acevedo at [facility]. 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   She was supposed to get a repeat in 3 years. She denies any narrow-caliber stools, BRBPR and melena. \par \par 4/24/19: CT scan: Diffuse small bowel dilatation and fluid-filled distention of the colon to the level of the distal descending colon, possibly large bowel obstruction with incompetent ileocecal valve. Possible stricture at the level of the proximal sigmoid colon. \par \par Patient NPO with NG tube. GI performed a colonoscopy which showed diverticulosis. findings at colonoscopy seem to suggest a tight stricture which colonoscopy couldn't get thru. The possibility of a neoplasm still exists. Patient taken to the OR on 4/29 for sigmoidectomy and colostomy. 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 \par \par No CEA was checked prior to surgery. \par \par 7/5/2019: She was discharged to the rehab on May 6, out of rehab in [place].  She feels fine now, eats normally, states non healing abdominal wound 2/2 surgery still on vacuum drainage with a lot of improvement, denies nausea, vomiting and abdominal pain. She can walk but feels tired easily. \par \par 7/29/2019  C1D14 capecitabine 1650mg BID. She has a little fatigue, denies nausea, vomiting, abdominal pain, diarrhea, constipation. Her appetite is good. \par \par 9/6/2019  C3D11 capecitabine 1650mg BID. She feels fine, tolerates well, denies nausea, vomiting, abdominal pain, fatigue, diarrhea, constipation. Her appetite is good. \par \par 10/7/19 : C4  D21   capecitabine 1650 mg BID, she complains of a little of hair loss, feels fine, denies nausea, vomiting, abdominal pain, fatigue, diarrhea, constipation. Her appetite is good. \par \par 10/7/2019 CT chest/abdomen/pelvis showed soft tissue density adjacent to site of colonic resection in the left lower quadrant suspicious for residual tumor. Consider PET/CT for correlation. Inferior mesenteric vein thrombus. \par \par 10/30/2019 C6D1   capecitabine 1650 mg BID, she complains of hair loss, feels fine, skin darkness, denies nausea, vomiting, abdominal pain, fatigue, diarrhea, constipation. Her appetite is good. He has tingling and numbness in the fingers and toes prior to chemotherapy. \par \par 11/25/2019 C7DD8 capecitabine 1650mg BID, complains of both hand and feet pain, skin pigmentation. She has loose stool x4 from colostomy bag. She denies nausea, vomiting, abdominal pain and fatigue. Her appetite is good. \par \par 12/19/19 : Cycle 8 Day 10. Had abd cramps and nausea, resolved in a day and her daughter had the same problem. She missed a dose of capecitabine that day. Fatigue at the end of the day but not that bothersome.  [de-identified] : poorly differentiated adenocarcinoma [de-identified] : 1/6/2020 : s/p completion of 8 cycles of capecitabine. Feels well but lost 3.5 kg since her last visit 3 weeks ago. \par No fevers, headache, sob, cp, nvd. Had an accident with colostomy. \par \par 4/8/2020: She states sometimes epigastric pain, also complains of hand small joint pain in both hands in one month. Her appetite is good. She states sometimes increasing liquid output in her ostomy bag.  She denies nausea, vomiting, and constipation.

## 2020-04-08 NOTE — REVIEW OF SYSTEMS
[Fever] : no fever [Chills] : no chills [Night Sweats] : no night sweats [Fatigue] : no fatigue [Recent Change In Weight] : ~T no recent weight change [Lower Ext Edema] : no lower extremity edema [Suicidal] : not suicidal [Insomnia] : no insomnia [Anxiety] : no anxiety [Depression] : no depression [FreeTextEntry5] : b/l feet

## 2020-08-11 RX ORDER — ONDANSETRON 8 MG/1
8 TABLET ORAL
Qty: 30 | Refills: 1 | Status: ACTIVE | COMMUNITY
Start: 2019-07-05 | End: 1900-01-01

## 2020-08-19 ENCOUNTER — RESULT REVIEW (OUTPATIENT)
Age: 80
End: 2020-08-19

## 2020-08-19 ENCOUNTER — OUTPATIENT (OUTPATIENT)
Dept: OUTPATIENT SERVICES | Facility: HOSPITAL | Age: 80
LOS: 1 days | End: 2020-08-19
Payer: MEDICARE

## 2020-08-19 ENCOUNTER — APPOINTMENT (OUTPATIENT)
Dept: CT IMAGING | Facility: IMAGING CENTER | Age: 80
End: 2020-08-19
Payer: MEDICARE

## 2020-08-19 DIAGNOSIS — Z00.8 ENCOUNTER FOR OTHER GENERAL EXAMINATION: ICD-10-CM

## 2020-08-19 DIAGNOSIS — C18.7 MALIGNANT NEOPLASM OF SIGMOID COLON: ICD-10-CM

## 2020-08-19 PROCEDURE — 74177 CT ABD & PELVIS W/CONTRAST: CPT

## 2020-08-19 PROCEDURE — 71260 CT THORAX DX C+: CPT

## 2020-08-19 PROCEDURE — 74177 CT ABD & PELVIS W/CONTRAST: CPT | Mod: 26

## 2020-08-19 PROCEDURE — 71260 CT THORAX DX C+: CPT | Mod: 26

## 2020-08-19 PROCEDURE — 82565 ASSAY OF CREATININE: CPT

## 2020-09-10 ENCOUNTER — OUTPATIENT (OUTPATIENT)
Dept: OUTPATIENT SERVICES | Facility: HOSPITAL | Age: 80
LOS: 1 days | Discharge: HOME | End: 2020-09-10

## 2020-09-10 DIAGNOSIS — C18.9 MALIGNANT NEOPLASM OF COLON, UNSPECIFIED: ICD-10-CM

## 2020-09-14 ENCOUNTER — APPOINTMENT (OUTPATIENT)
Dept: HEMATOLOGY ONCOLOGY | Facility: CLINIC | Age: 80
End: 2020-09-14
Payer: MEDICARE

## 2020-09-14 ENCOUNTER — RESULT REVIEW (OUTPATIENT)
Age: 80
End: 2020-09-14

## 2020-09-14 LAB
BASOPHILS # BLD AUTO: 0.06 K/UL — SIGNIFICANT CHANGE UP (ref 0–0.2)
BASOPHILS NFR BLD AUTO: 0.7 % — SIGNIFICANT CHANGE UP (ref 0–2)
EOSINOPHIL # BLD AUTO: 0.04 K/UL — SIGNIFICANT CHANGE UP (ref 0–0.5)
EOSINOPHIL NFR BLD AUTO: 0.5 % — SIGNIFICANT CHANGE UP (ref 0–6)
HCT VFR BLD CALC: 40.2 % — SIGNIFICANT CHANGE UP (ref 34.5–45)
HGB BLD-MCNC: 13.5 G/DL — SIGNIFICANT CHANGE UP (ref 11.5–15.5)
IMM GRANULOCYTES NFR BLD AUTO: 0.3 % — SIGNIFICANT CHANGE UP (ref 0–1.5)
LYMPHOCYTES # BLD AUTO: 1.61 K/UL — SIGNIFICANT CHANGE UP (ref 1–3.3)
LYMPHOCYTES # BLD AUTO: 18.4 % — SIGNIFICANT CHANGE UP (ref 13–44)
MCHC RBC-ENTMCNC: 30.9 PG — SIGNIFICANT CHANGE UP (ref 27–34)
MCHC RBC-ENTMCNC: 33.6 G/DL — SIGNIFICANT CHANGE UP (ref 32–36)
MCV RBC AUTO: 92 FL — SIGNIFICANT CHANGE UP (ref 80–100)
MONOCYTES # BLD AUTO: 0.85 K/UL — SIGNIFICANT CHANGE UP (ref 0–0.9)
MONOCYTES NFR BLD AUTO: 9.7 % — SIGNIFICANT CHANGE UP (ref 2–14)
NEUTROPHILS # BLD AUTO: 6.14 K/UL — SIGNIFICANT CHANGE UP (ref 1.8–7.4)
NEUTROPHILS NFR BLD AUTO: 70.4 % — SIGNIFICANT CHANGE UP (ref 43–77)
NRBC # BLD: 0 /100 WBCS — SIGNIFICANT CHANGE UP (ref 0–0)
PLATELET # BLD AUTO: 422 K/UL — HIGH (ref 150–400)
RBC # BLD: 4.37 M/UL — SIGNIFICANT CHANGE UP (ref 3.8–5.2)
RBC # FLD: 11.9 % — SIGNIFICANT CHANGE UP (ref 10.3–14.5)
WBC # BLD: 8.73 K/UL — SIGNIFICANT CHANGE UP (ref 3.8–10.5)
WBC # FLD AUTO: 8.73 K/UL — SIGNIFICANT CHANGE UP (ref 3.8–10.5)

## 2020-09-14 PROCEDURE — 99214 OFFICE O/P EST MOD 30 MIN: CPT

## 2020-09-14 RX ORDER — QUINAPRIL AND HYDROCHLOROTHIAZIDE 12.5; 1 MG/1; MG/1
10-12.5 TABLET ORAL
Qty: 90 | Refills: 0 | Status: DISCONTINUED | COMMUNITY
Start: 2020-05-04

## 2020-09-14 RX ORDER — AMOXICILLIN 500 MG/1
500 CAPSULE ORAL
Qty: 21 | Refills: 0 | Status: DISCONTINUED | COMMUNITY
Start: 2020-05-04

## 2020-09-14 RX ORDER — OLOPATADINE HCL 1 MG/ML
0.1 SOLUTION/ DROPS OPHTHALMIC
Qty: 5 | Refills: 0 | Status: DISCONTINUED | COMMUNITY
Start: 2020-05-18

## 2020-09-14 RX ORDER — RIVAROXABAN 20 MG/1
20 TABLET, FILM COATED ORAL
Qty: 30 | Refills: 3 | Status: ACTIVE | COMMUNITY
Start: 2019-10-30 | End: 1900-01-01

## 2020-09-14 NOTE — ASSESSMENT
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 I had a discussion with Dr. Loredo, Pathologist. This neuroendocrine feature is not carcinoid or high grade small cell vs large cell. The morphology of cancer cells has ex-goblet cells in the colon. CT scans only showed the stricture in the proximal sigmoid colon, normal liver, no retroperitoneal lymphadenopathy. If scans show suspicious lesions the biopsy will be needed.  If scans are negative she has stage IIIc sigmoid colon cancer. She needs adjuvant chemotherapy. I also explained to her stage IIIc, locally advanced with very high local and distant recurrence rate.  The standard of adjuvant chemo is FOLFOX or XELOX. However, for older patients 70 or older adding oxaliplatin is controversial given the toxicities and increasing the mortality. I am planning to start her only on capecitabine 1000mg/m2 BID for 14 days on one week off. The potential AEs are, but not limited to, fatigue, nausea, vomiting, diarrhea, hand foot syndrome, the risk of infection, myelosuppression. She agreed to sign the consent. s/p cycle 5 capecitabine adjuvant chemotherapy. She complains of some hair loss, otherwise, tolerates well. We reviewed recent CT scans with the patient. She has a more prominent soft tissue density around laina pouch and inferior mesenteric thrombosis. 11/14/19 : MRI abdomen showed a prominent soft tissue density at Laina pouch for possible infection/inflammation or cancer recurrence. CT in August showed peritoneal mass, mesenteric node and left adnexal mass, stable right breast mass.\par \par Plan \par \par Continue xarelto  20mg daily \par check labs today\par arrange U/S guided biopsy for LLQ peritoneal mass and breast mass in the right side \par RTC in 3 weeks \par

## 2020-09-14 NOTE — REVIEW OF SYSTEMS
[Negative] : Allergic/Immunologic [Fatigue] : fatigue [Abdominal Pain] : abdominal pain [Vomiting] : vomiting [Fever] : no fever [Chills] : no chills [Night Sweats] : no night sweats [Recent Change In Weight] : ~T no recent weight change [Lower Ext Edema] : no lower extremity edema [Constipation] : no constipation [Diarrhea] : no diarrhea [Suicidal] : not suicidal [Insomnia] : no insomnia [Anxiety] : no anxiety [Depression] : no depression [FreeTextEntry5] : b/l feet

## 2020-09-14 NOTE — HISTORY OF PRESENT ILLNESS
[Disease: _____________________] : Disease: [unfilled] [N: ___] : N[unfilled] [T: ___] : T[unfilled] [M: ___] : M[unfilled] [AJCC Stage: ____] : AJCC Stage: [unfilled] [Therapy: ___] : Therapy: [unfilled] [de-identified] : Nishi Frazier is a 79y female  with h/o of DM, HTN, HLD who presented with crampy abdominal pain, persistent nausea, anorexia, and intermittent vomiting about two weeks and hospitalized in April, 2019 associated with 20lbs weight loss.  The patient had her first colonoscopy "a few years ago" by a Dr. Acevedo at [facility]. 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   She was supposed to get a repeat in 3 years. She denies any narrow-caliber stools, BRBPR and melena. \par \par 4/24/19: CT scan: Diffuse small bowel dilatation and fluid-filled distention of the colon to the level of the distal descending colon, possibly large bowel obstruction with incompetent ileocecal valve. Possible stricture at the level of the proximal sigmoid colon. \par \par Patient NPO with NG tube. GI performed a colonoscopy which showed diverticulosis. findings at colonoscopy seem to suggest a tight stricture which colonoscopy couldn't get thru. The possibility of a neoplasm still exists. Patient taken to the OR on 4/29 for sigmoidectomy and colostomy. 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 \par \par No CEA was checked prior to surgery. \par \par 7/5/2019: She was discharged to the rehab on May 6, out of rehab in [place].  She feels fine now, eats normally, states non healing abdominal wound 2/2 surgery still on vacuum drainage with a lot of improvement, denies nausea, vomiting and abdominal pain. She can walk but feels tired easily. \par \par 7/29/2019  C1D14 capecitabine 1650mg BID. She has a little fatigue, denies nausea, vomiting, abdominal pain, diarrhea, constipation. Her appetite is good. \par \par 9/6/2019  C3D11 capecitabine 1650mg BID. She feels fine, tolerates well, denies nausea, vomiting, abdominal pain, fatigue, diarrhea, constipation. Her appetite is good. \par \par 10/7/19 : C4  D21   capecitabine 1650 mg BID, she complains of a little of hair loss, feels fine, denies nausea, vomiting, abdominal pain, fatigue, diarrhea, constipation. Her appetite is good. \par \par 10/7/2019 CT chest/abdomen/pelvis showed soft tissue density adjacent to site of colonic resection in the left lower quadrant suspicious for residual tumor. Consider PET/CT for correlation. Inferior mesenteric vein thrombus. \par \par 10/30/2019 C6D1   capecitabine 1650 mg BID, she complains of hair loss, feels fine, skin darkness, denies nausea, vomiting, abdominal pain, fatigue, diarrhea, constipation. Her appetite is good. He has tingling and numbness in the fingers and toes prior to chemotherapy. \par \par 11/25/2019 C7DD8 capecitabine 1650mg BID, complains of both hand and feet pain, skin pigmentation. She has loose stool x4 from colostomy bag. She denies nausea, vomiting, abdominal pain and fatigue. Her appetite is good. \par \par 12/19/19 : Cycle 8 Day 10. Had abd cramps and nausea, resolved in a day and her daughter had the same problem. She missed a dose of capecitabine that day. Fatigue at the end of the day but not that bothersome.  [de-identified] : poorly differentiated adenocarcinoma [de-identified] : 1/6/2020 : s/p completion of 8 cycles of capecitabine. Feels well but lost 3.5 kg since her last visit 3 weeks ago. \par No fevers, headache, sob, cp, nvd. Had an accident with colostomy. \par \par 4/8/2020: She states sometimes epigastric pain, also complains of hand small joint pain in both hands in one month. Her appetite is good. She states sometimes increasing liquid output in her ostomy bag.  She denies nausea, vomiting, and constipation.  \par \par 8/19/2020 CT c/a/p showed Omental nodularity and increased soft tissue density adjacent to the colostomy in the left lower quadrant, consistent with peritoneal carcinomatosis. \par Enlarged heterogeneous appearing left adnexa, suspicious for neoplastic involvement. Extensive atherosclerotic calcification with marked stenosis of the right common and external iliac arteries again noted. Stenosis is also noted in the proximal superior mesenteric artery. New loss of height involving the inferior endplate of T12\par \par 9/14/2020 She reports nausea sometimes vomiting phlegm. If she takes zofran she feels fine. She has poor appetite and lost weight. She has stopped xarelto in 4 months. She reports left lower pain in 3 months.

## 2020-09-20 DIAGNOSIS — Z01.818 ENCOUNTER FOR OTHER PREPROCEDURAL EXAMINATION: ICD-10-CM

## 2020-09-21 ENCOUNTER — APPOINTMENT (OUTPATIENT)
Dept: DISASTER EMERGENCY | Facility: CLINIC | Age: 80
End: 2020-09-21

## 2020-09-22 LAB — SARS-COV-2 N GENE NPH QL NAA+PROBE: NOT DETECTED

## 2020-09-25 ENCOUNTER — RESULT REVIEW (OUTPATIENT)
Age: 80
End: 2020-09-25

## 2020-09-25 ENCOUNTER — OUTPATIENT (OUTPATIENT)
Dept: OUTPATIENT SERVICES | Facility: HOSPITAL | Age: 80
LOS: 1 days | End: 2020-09-25
Payer: MEDICARE

## 2020-09-25 ENCOUNTER — APPOINTMENT (OUTPATIENT)
Dept: ULTRASOUND IMAGING | Facility: IMAGING CENTER | Age: 80
End: 2020-09-25
Payer: MEDICARE

## 2020-09-25 DIAGNOSIS — Z00.8 ENCOUNTER FOR OTHER GENERAL EXAMINATION: ICD-10-CM

## 2020-09-25 DIAGNOSIS — C18.7 MALIGNANT NEOPLASM OF SIGMOID COLON: ICD-10-CM

## 2020-09-25 PROCEDURE — 88173 CYTOPATH EVAL FNA REPORT: CPT | Mod: 26

## 2020-09-25 PROCEDURE — 10005 FNA BX W/US GDN 1ST LES: CPT

## 2020-09-25 PROCEDURE — 88173 CYTOPATH EVAL FNA REPORT: CPT

## 2020-09-25 PROCEDURE — 88172 CYTP DX EVAL FNA 1ST EA SITE: CPT

## 2020-09-29 LAB
APTT BLD: 27.6 SEC
CEA SERPL-MCNC: 2.4 NG/ML
INR PPP: 1.08 RATIO
PT BLD: 12.7 SEC

## 2020-09-30 LAB
ALBUMIN SERPL ELPH-MCNC: 4.6 G/DL
ALP BLD-CCNC: 65 U/L
ALT SERPL-CCNC: 18 U/L
ANION GAP SERPL CALC-SCNC: 19 MMOL/L
AST SERPL-CCNC: 18 U/L
BILIRUB SERPL-MCNC: 0.5 MG/DL
BUN SERPL-MCNC: 32 MG/DL
CALCIUM SERPL-MCNC: 10.4 MG/DL
CHLORIDE SERPL-SCNC: 83 MMOL/L
CO2 SERPL-SCNC: 27 MMOL/L
CREAT SERPL-MCNC: 1.71 MG/DL
GLUCOSE SERPL-MCNC: 135 MG/DL
NON-GYNECOLOGICAL CYTOLOGY STUDY: SIGNIFICANT CHANGE UP
POTASSIUM SERPL-SCNC: 4.5 MMOL/L
PROT SERPL-MCNC: 7.4 G/DL
SODIUM SERPL-SCNC: 128 MMOL/L

## 2020-10-08 ENCOUNTER — OUTPATIENT (OUTPATIENT)
Dept: OUTPATIENT SERVICES | Facility: HOSPITAL | Age: 80
LOS: 1 days | Discharge: HOME | End: 2020-10-08

## 2020-10-08 DIAGNOSIS — C18.9 MALIGNANT NEOPLASM OF COLON, UNSPECIFIED: ICD-10-CM

## 2020-10-12 ENCOUNTER — APPOINTMENT (OUTPATIENT)
Dept: HEMATOLOGY ONCOLOGY | Facility: CLINIC | Age: 80
End: 2020-10-12
Payer: MEDICARE

## 2020-10-12 VITALS
TEMPERATURE: 98.6 F | RESPIRATION RATE: 14 BRPM | SYSTOLIC BLOOD PRESSURE: 90 MMHG | DIASTOLIC BLOOD PRESSURE: 62 MMHG | HEART RATE: 95 BPM | OXYGEN SATURATION: 98 %

## 2020-10-12 VITALS — SYSTOLIC BLOOD PRESSURE: 126 MMHG | DIASTOLIC BLOOD PRESSURE: 84 MMHG

## 2020-10-12 DIAGNOSIS — G89.3 NEOPLASM RELATED PAIN (ACUTE) (CHRONIC): ICD-10-CM

## 2020-10-12 DIAGNOSIS — K59.03 DRUG INDUCED CONSTIPATION: ICD-10-CM

## 2020-10-12 PROCEDURE — 99214 OFFICE O/P EST MOD 30 MIN: CPT

## 2020-10-12 RX ORDER — SENNOSIDES 8.6 MG TABLETS 8.6 MG/1
8.6 TABLET ORAL DAILY
Qty: 60 | Refills: 0 | Status: ACTIVE | COMMUNITY
Start: 2020-10-12 | End: 1900-01-01

## 2020-10-12 RX ORDER — DOCUSATE SODIUM 100 MG/1
100 CAPSULE ORAL 3 TIMES DAILY
Qty: 90 | Refills: 0 | Status: ACTIVE | COMMUNITY
Start: 2020-10-12 | End: 1900-01-01

## 2020-10-12 RX ORDER — OXYCODONE AND ACETAMINOPHEN 2.5; 325 MG/1; MG/1
2.5-325 TABLET ORAL EVERY 6 HOURS
Qty: 120 | Refills: 0 | Status: ACTIVE | COMMUNITY
Start: 2020-10-12 | End: 1900-01-01

## 2020-10-13 NOTE — HISTORY OF PRESENT ILLNESS
[Disease: _____________________] : Disease: [unfilled] [T: ___] : T[unfilled] [N: ___] : N[unfilled] [M: ___] : M[unfilled] [AJCC Stage: ____] : AJCC Stage: [unfilled] [Therapy: ___] : Therapy: [unfilled] [de-identified] : Nishi Frazier is a 79y female  with h/o of DM, HTN, HLD who presented with crampy abdominal pain, persistent nausea, anorexia, and intermittent vomiting about two weeks and hospitalized in April, 2019 associated with 20lbs weight loss.  The patient had her first colonoscopy "a few years ago" by a Dr. Acevedo at [facility]. 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   She was supposed to get a repeat in 3 years. She denies any narrow-caliber stools, BRBPR and melena. \par \par 4/24/19: CT scan: Diffuse small bowel dilatation and fluid-filled distention of the colon to the level of the distal descending colon, possibly large bowel obstruction with incompetent ileocecal valve. Possible stricture at the level of the proximal sigmoid colon. \par \par Patient NPO with NG tube. GI performed a colonoscopy which showed diverticulosis. findings at colonoscopy seem to suggest a tight stricture which colonoscopy couldn't get thru. The possibility of a neoplasm still exists. Patient taken to the OR on 4/29 for sigmoidectomy and colostomy. 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 \par \par No CEA was checked prior to surgery. \par \par 7/5/2019: She was discharged to the rehab on May 6, out of rehab in [place].  She feels fine now, eats normally, states non healing abdominal wound 2/2 surgery still on vacuum drainage with a lot of improvement, denies nausea, vomiting and abdominal pain. She can walk but feels tired easily. \par \par 7/29/2019  C1D14 capecitabine 1650mg BID. She has a little fatigue, denies nausea, vomiting, abdominal pain, diarrhea, constipation. Her appetite is good. \par \par 9/6/2019  C3D11 capecitabine 1650mg BID. She feels fine, tolerates well, denies nausea, vomiting, abdominal pain, fatigue, diarrhea, constipation. Her appetite is good. \par \par 10/7/19 : C4  D21   capecitabine 1650 mg BID, she complains of a little of hair loss, feels fine, denies nausea, vomiting, abdominal pain, fatigue, diarrhea, constipation. Her appetite is good. \par \par 10/7/2019 CT chest/abdomen/pelvis showed soft tissue density adjacent to site of colonic resection in the left lower quadrant suspicious for residual tumor. Consider PET/CT for correlation. Inferior mesenteric vein thrombus. \par \par 10/30/2019 C6D1   capecitabine 1650 mg BID, she complains of hair loss, feels fine, skin darkness, denies nausea, vomiting, abdominal pain, fatigue, diarrhea, constipation. Her appetite is good. He has tingling and numbness in the fingers and toes prior to chemotherapy. \par \par 11/25/2019 C7DD8 capecitabine 1650mg BID, complains of both hand and feet pain, skin pigmentation. She has loose stool x4 from colostomy bag. She denies nausea, vomiting, abdominal pain and fatigue. Her appetite is good. \par \par 12/19/19 : Cycle 8 Day 10. Had abd cramps and nausea, resolved in a day and her daughter had the same problem. She missed a dose of capecitabine that day. Fatigue at the end of the day but not that bothersome. \par \par 1/6/2020 : s/p completion of 8 cycles of capecitabine. Feels well but lost 3.5 kg since her last visit 3 weeks ago. \par No fevers, headache, sob, cp, nvd. Had an accident with colostomy. \par \par 4/8/2020: She states sometimes epigastric pain, also complains of hand small joint pain in both hands in one month. Her appetite is good. She states sometimes increasing liquid output in her ostomy bag.  She denies nausea, vomiting, and constipation.  \par \par 8/19/2020 CT c/a/p showed Omental nodularity and increased soft tissue density adjacent to the colostomy in the left lower quadrant, consistent with peritoneal carcinomatosis. \par Enlarged heterogeneous appearing left adnexa, suspicious for neoplastic involvement. Extensive atherosclerotic calcification with marked stenosis of the right common and external iliac arteries again noted. Stenosis is also noted in the proximal superior mesenteric artery. New loss of height involving the inferior endplate of T12\par \par 9/14/2020 She reports nausea sometimes vomiting phlegm. If she takes zofran she feels fine. She has poor appetite and lost weight. She has stopped xarelto in 4 months. She reports left lower pain in 3 months.  [de-identified] : poorly differentiated adenocarcinoma [de-identified] : 10/12/2020 : Ms. Garcia is here for a follow up after biopsy. Path showed adenocarcinoma, c/w her primary colon cancer. Pt is here for follow up. She feels weak and fatigue. She c/o poor appetite and has not been eating or drinking much. Pt c/o pain at the ostomy site, and lower abd, likely irritation.  Pt has been spending more time in bed for the past 3 weeks. No f/c, cp, sob, constipation, diarrhea. \par Discussed with patient and her daughter that the colon cancer has recurred and she would need chemotherapy for disease control. However, pt is currently too weak, staying bedbound for more than 50% of the time. Giving her chemotherapy at this time would like cause her more harm. Pt and her daughter understand. Encourage her to increase PO intake. Will give IV hydration twice weekly for the next 3 weeks. And reassess her status for chemo in 3 weeks.

## 2020-10-13 NOTE — ASSESSMENT
[Supportive] : Goals of care discussed with patient: Supportive [Palliative Care Plan] : not applicable at this time [FreeTextEntry1] : Nishi Candelario is a 78 years old male who presented with bowel obstruction, had newly diagnosed sigmoid colon poorly differentiated adenocarcinoma s/p sigmoidectomy and colostomy stage T4aN2b, MSI-stable by IHC MLH1, MSH2, MSH6 and PMS2 intact, however  chromogranin A and synaptophysin patchy positive. I had a discussion with Dr. Loredo, Pathologist. This neuroendocrine feature is not carcinoid or high grade small cell vs large cell. The morphology of cancer cells has ex-goblet cells in the colon. CT scans only showed the stricture in the proximal sigmoid colon, normal liver, no retroperitoneal lymphadenopathy. If scans show suspicious lesions the biopsy will be needed.  If scans are negative she has stage IIIc sigmoid colon cancer. She needs adjuvant chemotherapy. I also explained to her stage IIIc, locally advanced with very high local and distant recurrence rate.  The standard of adjuvant chemo is FOLFOX or XELOX. However, for older patients 70 or older adding oxaliplatin is controversial given the toxicities and increasing the mortality. I am planning to start her only on capecitabine 1000mg/m2 BID for 14 days on one week off. The potential AEs are, but not limited to, fatigue, nausea, vomiting, diarrhea, hand foot syndrome, the risk of infection, myelosuppression. She agreed to sign the consent. s/p cycle 5 capecitabine adjuvant chemotherapy. She complains of some hair loss, otherwise, tolerates well. We reviewed recent CT scans with the patient. She has a more prominent soft tissue density around laina pouch and inferior mesenteric thrombosis. 11/14/19 : MRI abdomen showed a prominent soft tissue density at Laina pouch for possible infection/inflammation or cancer recurrence. CT in August showed peritoneal mass, mesenteric node and left adnexal mass, stable right breast mass.\par \par Plan \par \par -Continue xarelto  20mg daily \par -peritoneal nodule bx on 9/25 showed recurrence of her colon cancer. \par -Discussed with patient and her daughter that the colon cancer regarding distant metastasis and she would need chemotherapy for disease control, FOLFIRI. Sent F1CDx, however, her tissue is not sufficient. will submit her original one to check Kras, Nras, Braf and HER2 and NTRK\par - However, pt is currently too weak, staying bedbound for more than 50% of the time. Giving her chemotherapy at this time would like cause her more harm. Pt and her daughter express understanding.\par - Encouraged her to increase PO intake. \par -Will give IV hydration twice weekly for the next 3 weeks. And reassess her status for chemo then.\par -sent percocet for abd pain\par \par RTC in 3 weeks \par

## 2020-10-13 NOTE — REVIEW OF SYSTEMS
[Fatigue] : fatigue [Abdominal Pain] : abdominal pain [Negative] : Allergic/Immunologic [Fever] : no fever [Chills] : no chills [Night Sweats] : no night sweats [Recent Change In Weight] : ~T no recent weight change [Lower Ext Edema] : no lower extremity edema [Vomiting] : no vomiting [Constipation] : no constipation [Diarrhea] : no diarrhea [Suicidal] : not suicidal [Insomnia] : no insomnia [Anxiety] : no anxiety [Depression] : no depression [FreeTextEntry5] : b/l feet

## 2020-10-13 NOTE — PHYSICAL EXAM
[Capable of only limited self care, confined to bed or chair more than 50% of waking hours] : Status 3- Capable of only limited self care, confined to bed or chair more than 50% of waking hours [Normal] : RRR, normal S1S2, no murmurs, rubs, gallops [de-identified] : ostomy bag in place, normal stool color. abd soft, mild tenderness to plapation

## 2020-10-14 DIAGNOSIS — C18.7 MALIGNANT NEOPLASM OF SIGMOID COLON: ICD-10-CM

## 2020-10-14 RX ORDER — OXYCODONE AND ACETAMINOPHEN 2.5; 325 MG/1; MG/1
2.5-325 TABLET ORAL EVERY 6 HOURS
Qty: 28 | Refills: 0 | Status: ACTIVE | COMMUNITY
Start: 2020-10-13 | End: 1900-01-01

## 2020-10-14 RX ORDER — SODIUM CHLORIDE 9 G/ML
0.9 INJECTION, SOLUTION INTRAVENOUS
Qty: 30 | Refills: 30 | Status: ACTIVE | COMMUNITY
Start: 2020-10-14 | End: 1900-01-01

## 2020-10-16 ENCOUNTER — INPATIENT (INPATIENT)
Facility: HOSPITAL | Age: 80
LOS: 4 days | Discharge: HOSPICE | End: 2020-10-21
Attending: INTERNAL MEDICINE | Admitting: INTERNAL MEDICINE
Payer: MEDICARE

## 2020-10-16 VITALS
SYSTOLIC BLOOD PRESSURE: 64 MMHG | HEIGHT: 65 IN | HEART RATE: 84 BPM | WEIGHT: 115.08 LBS | RESPIRATION RATE: 13 BRPM | OXYGEN SATURATION: 99 % | DIASTOLIC BLOOD PRESSURE: 34 MMHG

## 2020-10-16 DIAGNOSIS — N17.9 ACUTE KIDNEY FAILURE, UNSPECIFIED: ICD-10-CM

## 2020-10-16 DIAGNOSIS — I70.8 ATHEROSCLEROSIS OF OTHER ARTERIES: ICD-10-CM

## 2020-10-16 DIAGNOSIS — I74.5 EMBOLISM AND THROMBOSIS OF ILIAC ARTERY: ICD-10-CM

## 2020-10-16 DIAGNOSIS — A41.9 SEPSIS, UNSPECIFIED ORGANISM: ICD-10-CM

## 2020-10-16 DIAGNOSIS — K56.699 OTHER INTESTINAL OBSTRUCTION UNSPECIFIED AS TO PARTIAL VERSUS COMPLETE OBSTRUCTION: ICD-10-CM

## 2020-10-16 DIAGNOSIS — Z93.3 COLOSTOMY STATUS: ICD-10-CM

## 2020-10-16 DIAGNOSIS — R10.9 UNSPECIFIED ABDOMINAL PAIN: ICD-10-CM

## 2020-10-16 DIAGNOSIS — I73.9 PERIPHERAL VASCULAR DISEASE, UNSPECIFIED: ICD-10-CM

## 2020-10-16 DIAGNOSIS — I24.8 OTHER FORMS OF ACUTE ISCHEMIC HEART DISEASE: ICD-10-CM

## 2020-10-16 DIAGNOSIS — N17.0 ACUTE KIDNEY FAILURE WITH TUBULAR NECROSIS: ICD-10-CM

## 2020-10-16 DIAGNOSIS — G93.41 METABOLIC ENCEPHALOPATHY: ICD-10-CM

## 2020-10-16 DIAGNOSIS — R65.21 SEVERE SEPSIS WITH SEPTIC SHOCK: ICD-10-CM

## 2020-10-16 DIAGNOSIS — E87.1 HYPO-OSMOLALITY AND HYPONATREMIA: ICD-10-CM

## 2020-10-16 DIAGNOSIS — R53.2 FUNCTIONAL QUADRIPLEGIA: ICD-10-CM

## 2020-10-16 DIAGNOSIS — E43 UNSPECIFIED SEVERE PROTEIN-CALORIE MALNUTRITION: ICD-10-CM

## 2020-10-16 DIAGNOSIS — G89.3 NEOPLASM RELATED PAIN (ACUTE) (CHRONIC): ICD-10-CM

## 2020-10-16 DIAGNOSIS — K56.609 UNSPECIFIED INTESTINAL OBSTRUCTION, UNSPECIFIED AS TO PARTIAL VERSUS COMPLETE OBSTRUCTION: ICD-10-CM

## 2020-10-16 DIAGNOSIS — Z85.038 PERSONAL HISTORY OF OTHER MALIGNANT NEOPLASM OF LARGE INTESTINE: ICD-10-CM

## 2020-10-16 DIAGNOSIS — Z98.890 OTHER SPECIFIED POSTPROCEDURAL STATES: Chronic | ICD-10-CM

## 2020-10-16 DIAGNOSIS — C18.9 MALIGNANT NEOPLASM OF COLON, UNSPECIFIED: ICD-10-CM

## 2020-10-16 DIAGNOSIS — Z90.49 ACQUIRED ABSENCE OF OTHER SPECIFIED PARTS OF DIGESTIVE TRACT: Chronic | ICD-10-CM

## 2020-10-16 DIAGNOSIS — Z51.5 ENCOUNTER FOR PALLIATIVE CARE: ICD-10-CM

## 2020-10-16 DIAGNOSIS — Z66 DO NOT RESUSCITATE: ICD-10-CM

## 2020-10-16 DIAGNOSIS — I10 ESSENTIAL (PRIMARY) HYPERTENSION: ICD-10-CM

## 2020-10-16 LAB
ABO RH CONFIRMATION: SIGNIFICANT CHANGE UP
ALBUMIN SERPL ELPH-MCNC: 1.9 G/DL — LOW (ref 3.3–5)
ALP SERPL-CCNC: 76 U/L — SIGNIFICANT CHANGE UP (ref 40–120)
ALT FLD-CCNC: 87 U/L — HIGH (ref 12–78)
ANION GAP SERPL CALC-SCNC: 13 MMOL/L — SIGNIFICANT CHANGE UP (ref 5–17)
ANION GAP SERPL CALC-SCNC: 13 MMOL/L — SIGNIFICANT CHANGE UP (ref 5–17)
ANION GAP SERPL CALC-SCNC: 14 MMOL/L — SIGNIFICANT CHANGE UP (ref 5–17)
ANION GAP SERPL CALC-SCNC: 17 MMOL/L — SIGNIFICANT CHANGE UP (ref 5–17)
APPEARANCE UR: CLEAR — SIGNIFICANT CHANGE UP
APTT BLD: 32 SEC — SIGNIFICANT CHANGE UP (ref 27.5–35.5)
AST SERPL-CCNC: 175 U/L — HIGH (ref 15–37)
BACTERIA # UR AUTO: ABNORMAL
BASOPHILS # BLD AUTO: 0.07 K/UL — SIGNIFICANT CHANGE UP (ref 0–0.2)
BASOPHILS NFR BLD AUTO: 0.6 % — SIGNIFICANT CHANGE UP (ref 0–2)
BILIRUB SERPL-MCNC: 0.5 MG/DL — SIGNIFICANT CHANGE UP (ref 0.2–1.2)
BILIRUB UR-MCNC: NEGATIVE — SIGNIFICANT CHANGE UP
BLD GP AB SCN SERPL QL: SIGNIFICANT CHANGE UP
BUN SERPL-MCNC: 106 MG/DL — HIGH (ref 7–23)
BUN SERPL-MCNC: 72 MG/DL — HIGH (ref 7–23)
BUN SERPL-MCNC: 72 MG/DL — HIGH (ref 7–23)
BUN SERPL-MCNC: 86 MG/DL — HIGH (ref 7–23)
BURR CELLS BLD QL SMEAR: PRESENT — SIGNIFICANT CHANGE UP
CALCIUM SERPL-MCNC: 6.3 MG/DL — CRITICAL LOW (ref 8.5–10.1)
CALCIUM SERPL-MCNC: 6.4 MG/DL — CRITICAL LOW (ref 8.5–10.1)
CALCIUM SERPL-MCNC: 6.4 MG/DL — CRITICAL LOW (ref 8.5–10.1)
CALCIUM SERPL-MCNC: 7.5 MG/DL — LOW (ref 8.5–10.1)
CHLORIDE SERPL-SCNC: 100 MMOL/L — SIGNIFICANT CHANGE UP (ref 96–108)
CHLORIDE SERPL-SCNC: 105 MMOL/L — SIGNIFICANT CHANGE UP (ref 96–108)
CHLORIDE SERPL-SCNC: 86 MMOL/L — LOW (ref 96–108)
CHLORIDE SERPL-SCNC: 99 MMOL/L — SIGNIFICANT CHANGE UP (ref 96–108)
CK SERPL-CCNC: 1089 U/L — HIGH (ref 26–192)
CK SERPL-CCNC: 179 U/L — SIGNIFICANT CHANGE UP (ref 26–192)
CO2 SERPL-SCNC: 13 MMOL/L — LOW (ref 22–31)
CO2 SERPL-SCNC: 16 MMOL/L — LOW (ref 22–31)
CO2 SERPL-SCNC: 18 MMOL/L — LOW (ref 22–31)
CO2 SERPL-SCNC: 21 MMOL/L — LOW (ref 22–31)
COLOR SPEC: YELLOW — SIGNIFICANT CHANGE UP
COMMENT - URINE: SIGNIFICANT CHANGE UP
CREAT SERPL-MCNC: 1.98 MG/DL — HIGH (ref 0.5–1.3)
CREAT SERPL-MCNC: 2.12 MG/DL — HIGH (ref 0.5–1.3)
CREAT SERPL-MCNC: 2.38 MG/DL — HIGH (ref 0.5–1.3)
CREAT SERPL-MCNC: 3.47 MG/DL — HIGH (ref 0.5–1.3)
DIFF PNL FLD: ABNORMAL
ELLIPTOCYTES BLD QL SMEAR: SLIGHT — SIGNIFICANT CHANGE UP
EOSINOPHIL # BLD AUTO: 0 K/UL — SIGNIFICANT CHANGE UP (ref 0–0.5)
EOSINOPHIL NFR BLD AUTO: 0 % — SIGNIFICANT CHANGE UP (ref 0–6)
EPI CELLS # UR: SIGNIFICANT CHANGE UP
FLU A RESULT: SIGNIFICANT CHANGE UP
FLU A RESULT: SIGNIFICANT CHANGE UP
FLUAV AG NPH QL: SIGNIFICANT CHANGE UP
FLUBV AG NPH QL: SIGNIFICANT CHANGE UP
GLUCOSE BLDC GLUCOMTR-MCNC: 136 MG/DL — HIGH (ref 70–99)
GLUCOSE BLDC GLUCOMTR-MCNC: 150 MG/DL — HIGH (ref 70–99)
GLUCOSE BLDC GLUCOMTR-MCNC: 177 MG/DL — HIGH (ref 70–99)
GLUCOSE BLDC GLUCOMTR-MCNC: 207 MG/DL — HIGH (ref 70–99)
GLUCOSE SERPL-MCNC: 128 MG/DL — HIGH (ref 70–99)
GLUCOSE SERPL-MCNC: 139 MG/DL — HIGH (ref 70–99)
GLUCOSE SERPL-MCNC: 170 MG/DL — HIGH (ref 70–99)
GLUCOSE SERPL-MCNC: 292 MG/DL — HIGH (ref 70–99)
GLUCOSE UR QL: NEGATIVE MG/DL — SIGNIFICANT CHANGE UP
HCT VFR BLD CALC: 27.6 % — LOW (ref 34.5–45)
HCT VFR BLD CALC: 37.6 % — SIGNIFICANT CHANGE UP (ref 34.5–45)
HGB BLD-MCNC: 13 G/DL — SIGNIFICANT CHANGE UP (ref 11.5–15.5)
HGB BLD-MCNC: 9.8 G/DL — LOW (ref 11.5–15.5)
IMM GRANULOCYTES NFR BLD AUTO: 0.9 % — SIGNIFICANT CHANGE UP (ref 0–1.5)
INR BLD: 1.21 RATIO — HIGH (ref 0.88–1.16)
KETONES UR-MCNC: NEGATIVE — SIGNIFICANT CHANGE UP
LACTATE SERPL-SCNC: 6 MMOL/L — CRITICAL HIGH (ref 0.7–2)
LACTATE SERPL-SCNC: 6.4 MMOL/L — CRITICAL HIGH (ref 0.7–2)
LACTATE SERPL-SCNC: 9 MMOL/L — CRITICAL HIGH (ref 0.7–2)
LEUKOCYTE ESTERASE UR-ACNC: NEGATIVE — SIGNIFICANT CHANGE UP
LYMPHOCYTES # BLD AUTO: 0.77 K/UL — LOW (ref 1–3.3)
LYMPHOCYTES # BLD AUTO: 7.1 % — LOW (ref 13–44)
MAGNESIUM SERPL-MCNC: 1.6 MG/DL — SIGNIFICANT CHANGE UP (ref 1.6–2.6)
MANUAL SMEAR VERIFICATION: SIGNIFICANT CHANGE UP
MCHC RBC-ENTMCNC: 30.7 PG — SIGNIFICANT CHANGE UP (ref 27–34)
MCHC RBC-ENTMCNC: 31.2 PG — SIGNIFICANT CHANGE UP (ref 27–34)
MCHC RBC-ENTMCNC: 34.6 GM/DL — SIGNIFICANT CHANGE UP (ref 32–36)
MCHC RBC-ENTMCNC: 35.5 GM/DL — SIGNIFICANT CHANGE UP (ref 32–36)
MCV RBC AUTO: 87.9 FL — SIGNIFICANT CHANGE UP (ref 80–100)
MCV RBC AUTO: 88.7 FL — SIGNIFICANT CHANGE UP (ref 80–100)
MONOCYTES # BLD AUTO: 0.51 K/UL — SIGNIFICANT CHANGE UP (ref 0–0.9)
MONOCYTES NFR BLD AUTO: 4.7 % — SIGNIFICANT CHANGE UP (ref 2–14)
NEUTROPHILS # BLD AUTO: 9.42 K/UL — HIGH (ref 1.8–7.4)
NEUTROPHILS NFR BLD AUTO: 86.7 % — HIGH (ref 43–77)
NITRITE UR-MCNC: NEGATIVE — SIGNIFICANT CHANGE UP
NRBC # BLD: 0 /100 WBCS — SIGNIFICANT CHANGE UP (ref 0–0)
NRBC # BLD: 0 /100 WBCS — SIGNIFICANT CHANGE UP (ref 0–0)
PH UR: 5 — SIGNIFICANT CHANGE UP (ref 5–8)
PHOSPHATE SERPL-MCNC: 2.9 MG/DL — SIGNIFICANT CHANGE UP (ref 2.5–4.5)
PLAT MORPH BLD: NORMAL — SIGNIFICANT CHANGE UP
PLATELET # BLD AUTO: 346 K/UL — SIGNIFICANT CHANGE UP (ref 150–400)
PLATELET # BLD AUTO: 460 K/UL — HIGH (ref 150–400)
POTASSIUM SERPL-MCNC: 3.1 MMOL/L — LOW (ref 3.5–5.3)
POTASSIUM SERPL-MCNC: 3.4 MMOL/L — LOW (ref 3.5–5.3)
POTASSIUM SERPL-MCNC: 3.6 MMOL/L — SIGNIFICANT CHANGE UP (ref 3.5–5.3)
POTASSIUM SERPL-MCNC: 4.4 MMOL/L — SIGNIFICANT CHANGE UP (ref 3.5–5.3)
POTASSIUM SERPL-SCNC: 3.1 MMOL/L — LOW (ref 3.5–5.3)
POTASSIUM SERPL-SCNC: 3.4 MMOL/L — LOW (ref 3.5–5.3)
POTASSIUM SERPL-SCNC: 3.6 MMOL/L — SIGNIFICANT CHANGE UP (ref 3.5–5.3)
POTASSIUM SERPL-SCNC: 4.4 MMOL/L — SIGNIFICANT CHANGE UP (ref 3.5–5.3)
PROT SERPL-MCNC: 5.6 GM/DL — LOW (ref 6–8.3)
PROT UR-MCNC: 30 MG/DL
PROTHROM AB SERPL-ACNC: 13.9 SEC — HIGH (ref 10.6–13.6)
RBC # BLD: 3.14 M/UL — LOW (ref 3.8–5.2)
RBC # BLD: 4.24 M/UL — SIGNIFICANT CHANGE UP (ref 3.8–5.2)
RBC # FLD: 14.6 % — HIGH (ref 10.3–14.5)
RBC # FLD: 14.7 % — HIGH (ref 10.3–14.5)
RBC BLD AUTO: ABNORMAL
RBC CASTS # UR COMP ASSIST: SIGNIFICANT CHANGE UP /HPF (ref 0–4)
RSV RESULT: SIGNIFICANT CHANGE UP
RSV RNA RESP QL NAA+PROBE: SIGNIFICANT CHANGE UP
SARS-COV-2 RNA SPEC QL NAA+PROBE: SIGNIFICANT CHANGE UP
SCHISTOCYTES BLD QL AUTO: SLIGHT — SIGNIFICANT CHANGE UP
SODIUM SERPL-SCNC: 124 MMOL/L — LOW (ref 135–145)
SODIUM SERPL-SCNC: 129 MMOL/L — LOW (ref 135–145)
SODIUM SERPL-SCNC: 130 MMOL/L — LOW (ref 135–145)
SODIUM SERPL-SCNC: 132 MMOL/L — LOW (ref 135–145)
SP GR SPEC: 1.01 — SIGNIFICANT CHANGE UP (ref 1.01–1.02)
TROPONIN I SERPL-MCNC: 0.22 NG/ML — HIGH (ref 0.01–0.04)
TROPONIN I SERPL-MCNC: 0.22 NG/ML — HIGH (ref 0.01–0.04)
TSH SERPL-MCNC: 1.28 UIU/ML — SIGNIFICANT CHANGE UP (ref 0.36–3.74)
UROBILINOGEN FLD QL: NEGATIVE MG/DL — SIGNIFICANT CHANGE UP
WBC # BLD: 10.87 K/UL — HIGH (ref 3.8–10.5)
WBC # BLD: 8.06 K/UL — SIGNIFICANT CHANGE UP (ref 3.8–10.5)
WBC # FLD AUTO: 10.87 K/UL — HIGH (ref 3.8–10.5)
WBC # FLD AUTO: 8.06 K/UL — SIGNIFICANT CHANGE UP (ref 3.8–10.5)
WBC UR QL: SIGNIFICANT CHANGE UP

## 2020-10-16 PROCEDURE — 71275 CT ANGIOGRAPHY CHEST: CPT | Mod: 26,MA

## 2020-10-16 PROCEDURE — 99223 1ST HOSP IP/OBS HIGH 75: CPT

## 2020-10-16 PROCEDURE — 99291 CRITICAL CARE FIRST HOUR: CPT

## 2020-10-16 PROCEDURE — 74174 CTA ABD&PLVS W/CONTRAST: CPT | Mod: 26,MA

## 2020-10-16 PROCEDURE — 76937 US GUIDE VASCULAR ACCESS: CPT | Mod: 26

## 2020-10-16 PROCEDURE — 71045 X-RAY EXAM CHEST 1 VIEW: CPT | Mod: 26,76

## 2020-10-16 PROCEDURE — 88360 TUMOR IMMUNOHISTOCHEM/MANUAL: CPT | Mod: 26

## 2020-10-16 PROCEDURE — 93010 ELECTROCARDIOGRAM REPORT: CPT

## 2020-10-16 PROCEDURE — 99233 SBSQ HOSP IP/OBS HIGH 50: CPT | Mod: 25

## 2020-10-16 PROCEDURE — 36556 INSERT NON-TUNNEL CV CATH: CPT

## 2020-10-16 RX ORDER — ACETAMINOPHEN 500 MG
650 TABLET ORAL ONCE
Refills: 0 | Status: COMPLETED | OUTPATIENT
Start: 2020-10-16 | End: 2020-10-16

## 2020-10-16 RX ORDER — PIPERACILLIN AND TAZOBACTAM 4; .5 G/20ML; G/20ML
3.38 INJECTION, POWDER, LYOPHILIZED, FOR SOLUTION INTRAVENOUS EVERY 12 HOURS
Refills: 0 | Status: DISCONTINUED | OUTPATIENT
Start: 2020-10-16 | End: 2020-10-19

## 2020-10-16 RX ORDER — DEXAMETHASONE 0.5 MG/5ML
4 ELIXIR ORAL EVERY 12 HOURS
Refills: 0 | Status: DISCONTINUED | OUTPATIENT
Start: 2020-10-16 | End: 2020-10-19

## 2020-10-16 RX ORDER — INFLUENZA VIRUS VACCINE 15; 15; 15; 15 UG/.5ML; UG/.5ML; UG/.5ML; UG/.5ML
0.5 SUSPENSION INTRAMUSCULAR ONCE
Refills: 0 | Status: DISCONTINUED | OUTPATIENT
Start: 2020-10-16 | End: 2020-10-21

## 2020-10-16 RX ORDER — DEXTROSE 50 % IN WATER 50 %
15 SYRINGE (ML) INTRAVENOUS ONCE
Refills: 0 | Status: DISCONTINUED | OUTPATIENT
Start: 2020-10-16 | End: 2020-10-19

## 2020-10-16 RX ORDER — DEXTROSE 50 % IN WATER 50 %
12.5 SYRINGE (ML) INTRAVENOUS ONCE
Refills: 0 | Status: DISCONTINUED | OUTPATIENT
Start: 2020-10-16 | End: 2020-10-19

## 2020-10-16 RX ORDER — HYDROMORPHONE HYDROCHLORIDE 2 MG/ML
0.5 INJECTION INTRAMUSCULAR; INTRAVENOUS; SUBCUTANEOUS EVERY 4 HOURS
Refills: 0 | Status: DISCONTINUED | OUTPATIENT
Start: 2020-10-16 | End: 2020-10-18

## 2020-10-16 RX ORDER — PIPERACILLIN AND TAZOBACTAM 4; .5 G/20ML; G/20ML
3.38 INJECTION, POWDER, LYOPHILIZED, FOR SOLUTION INTRAVENOUS ONCE
Refills: 0 | Status: COMPLETED | OUTPATIENT
Start: 2020-10-16 | End: 2020-10-16

## 2020-10-16 RX ORDER — INSULIN LISPRO 100/ML
VIAL (ML) SUBCUTANEOUS EVERY 6 HOURS
Refills: 0 | Status: DISCONTINUED | OUTPATIENT
Start: 2020-10-16 | End: 2020-10-19

## 2020-10-16 RX ORDER — OCTREOTIDE ACETATE 200 UG/ML
100 INJECTION, SOLUTION INTRAVENOUS; SUBCUTANEOUS THREE TIMES A DAY
Refills: 0 | Status: DISCONTINUED | OUTPATIENT
Start: 2020-10-16 | End: 2020-10-19

## 2020-10-16 RX ORDER — GLUCAGON INJECTION, SOLUTION 0.5 MG/.1ML
1 INJECTION, SOLUTION SUBCUTANEOUS ONCE
Refills: 0 | Status: DISCONTINUED | OUTPATIENT
Start: 2020-10-16 | End: 2020-10-19

## 2020-10-16 RX ORDER — NOREPINEPHRINE BITARTRATE/D5W 8 MG/250ML
0.05 PLASTIC BAG, INJECTION (ML) INTRAVENOUS
Qty: 16 | Refills: 0 | Status: DISCONTINUED | OUTPATIENT
Start: 2020-10-16 | End: 2020-10-19

## 2020-10-16 RX ORDER — HEPARIN SODIUM 5000 [USP'U]/ML
5000 INJECTION INTRAVENOUS; SUBCUTANEOUS EVERY 8 HOURS
Refills: 0 | Status: DISCONTINUED | OUTPATIENT
Start: 2020-10-16 | End: 2020-10-19

## 2020-10-16 RX ORDER — SODIUM CHLORIDE 9 MG/ML
1000 INJECTION, SOLUTION INTRAVENOUS
Refills: 0 | Status: DISCONTINUED | OUTPATIENT
Start: 2020-10-16 | End: 2020-10-19

## 2020-10-16 RX ORDER — ACETAMINOPHEN 500 MG
1000 TABLET ORAL ONCE
Refills: 0 | Status: COMPLETED | OUTPATIENT
Start: 2020-10-16 | End: 2020-10-16

## 2020-10-16 RX ORDER — SODIUM CHLORIDE 9 MG/ML
10 INJECTION INTRAMUSCULAR; INTRAVENOUS; SUBCUTANEOUS
Refills: 0 | Status: DISCONTINUED | OUTPATIENT
Start: 2020-10-16 | End: 2020-10-21

## 2020-10-16 RX ORDER — SODIUM CHLORIDE 9 MG/ML
3000 INJECTION INTRAMUSCULAR; INTRAVENOUS; SUBCUTANEOUS ONCE
Refills: 0 | Status: COMPLETED | OUTPATIENT
Start: 2020-10-16 | End: 2020-10-16

## 2020-10-16 RX ORDER — SODIUM CHLORIDE 9 MG/ML
1000 INJECTION INTRAMUSCULAR; INTRAVENOUS; SUBCUTANEOUS ONCE
Refills: 0 | Status: COMPLETED | OUTPATIENT
Start: 2020-10-16 | End: 2020-10-16

## 2020-10-16 RX ORDER — SODIUM CHLORIDE 9 MG/ML
1000 INJECTION, SOLUTION INTRAVENOUS ONCE
Refills: 0 | Status: COMPLETED | OUTPATIENT
Start: 2020-10-16 | End: 2020-10-16

## 2020-10-16 RX ORDER — VANCOMYCIN HCL 1 G
1000 VIAL (EA) INTRAVENOUS ONCE
Refills: 0 | Status: COMPLETED | OUTPATIENT
Start: 2020-10-16 | End: 2020-10-16

## 2020-10-16 RX ORDER — DEXTROSE 50 % IN WATER 50 %
25 SYRINGE (ML) INTRAVENOUS ONCE
Refills: 0 | Status: DISCONTINUED | OUTPATIENT
Start: 2020-10-16 | End: 2020-10-19

## 2020-10-16 RX ORDER — CALCIUM GLUCONATE 100 MG/ML
1 VIAL (ML) INTRAVENOUS ONCE
Refills: 0 | Status: COMPLETED | OUTPATIENT
Start: 2020-10-16 | End: 2020-10-17

## 2020-10-16 RX ORDER — CHLORHEXIDINE GLUCONATE 213 G/1000ML
1 SOLUTION TOPICAL
Refills: 0 | Status: DISCONTINUED | OUTPATIENT
Start: 2020-10-16 | End: 2020-10-20

## 2020-10-16 RX ORDER — SODIUM CHLORIDE 9 MG/ML
1000 INJECTION, SOLUTION INTRAVENOUS
Refills: 0 | Status: DISCONTINUED | OUTPATIENT
Start: 2020-10-16 | End: 2020-10-16

## 2020-10-16 RX ORDER — POTASSIUM CHLORIDE 20 MEQ
10 PACKET (EA) ORAL
Refills: 0 | Status: COMPLETED | OUTPATIENT
Start: 2020-10-16 | End: 2020-10-16

## 2020-10-16 RX ORDER — MAGNESIUM SULFATE 500 MG/ML
2 VIAL (ML) INJECTION ONCE
Refills: 0 | Status: COMPLETED | OUTPATIENT
Start: 2020-10-16 | End: 2020-10-16

## 2020-10-16 RX ORDER — SODIUM CHLORIDE 9 MG/ML
3000 INJECTION, SOLUTION INTRAVENOUS ONCE
Refills: 0 | Status: DISCONTINUED | OUTPATIENT
Start: 2020-10-16 | End: 2020-10-16

## 2020-10-16 RX ORDER — ONDANSETRON 8 MG/1
4 TABLET, FILM COATED ORAL EVERY 6 HOURS
Refills: 0 | Status: DISCONTINUED | OUTPATIENT
Start: 2020-10-16 | End: 2020-10-21

## 2020-10-16 RX ORDER — SODIUM CHLORIDE 9 MG/ML
1000 INJECTION, SOLUTION INTRAVENOUS
Refills: 0 | Status: DISCONTINUED | OUTPATIENT
Start: 2020-10-16 | End: 2020-10-17

## 2020-10-16 RX ORDER — NOREPINEPHRINE BITARTRATE/D5W 8 MG/250ML
0.05 PLASTIC BAG, INJECTION (ML) INTRAVENOUS
Qty: 8 | Refills: 0 | Status: DISCONTINUED | OUTPATIENT
Start: 2020-10-16 | End: 2020-10-16

## 2020-10-16 RX ORDER — PANTOPRAZOLE SODIUM 20 MG/1
40 TABLET, DELAYED RELEASE ORAL DAILY
Refills: 0 | Status: DISCONTINUED | OUTPATIENT
Start: 2020-10-16 | End: 2020-10-20

## 2020-10-16 RX ADMIN — SODIUM CHLORIDE 2000 MILLILITER(S): 9 INJECTION, SOLUTION INTRAVENOUS at 07:01

## 2020-10-16 RX ADMIN — PANTOPRAZOLE SODIUM 40 MILLIGRAM(S): 20 TABLET, DELAYED RELEASE ORAL at 11:42

## 2020-10-16 RX ADMIN — Medication 2.93 MICROGRAM(S)/KG/MIN: at 23:10

## 2020-10-16 RX ADMIN — Medication 4: at 17:23

## 2020-10-16 RX ADMIN — Medication 50 GRAM(S): at 13:43

## 2020-10-16 RX ADMIN — HYDROMORPHONE HYDROCHLORIDE 0.5 MILLIGRAM(S): 2 INJECTION INTRAMUSCULAR; INTRAVENOUS; SUBCUTANEOUS at 16:05

## 2020-10-16 RX ADMIN — PIPERACILLIN AND TAZOBACTAM 25 GRAM(S): 4; .5 INJECTION, POWDER, LYOPHILIZED, FOR SOLUTION INTRAVENOUS at 22:12

## 2020-10-16 RX ADMIN — SODIUM CHLORIDE 100 MILLILITER(S): 9 INJECTION, SOLUTION INTRAVENOUS at 22:11

## 2020-10-16 RX ADMIN — Medication 4 MILLIGRAM(S): at 18:21

## 2020-10-16 RX ADMIN — Medication 100 MILLIEQUIVALENT(S): at 14:18

## 2020-10-16 RX ADMIN — Medication 250 MILLIGRAM(S): at 03:25

## 2020-10-16 RX ADMIN — HEPARIN SODIUM 5000 UNIT(S): 5000 INJECTION INTRAVENOUS; SUBCUTANEOUS at 22:32

## 2020-10-16 RX ADMIN — PIPERACILLIN AND TAZOBACTAM 3.38 GRAM(S): 4; .5 INJECTION, POWDER, LYOPHILIZED, FOR SOLUTION INTRAVENOUS at 03:20

## 2020-10-16 RX ADMIN — SODIUM CHLORIDE 90 MILLILITER(S): 9 INJECTION, SOLUTION INTRAVENOUS at 08:08

## 2020-10-16 RX ADMIN — HYDROMORPHONE HYDROCHLORIDE 0.5 MILLIGRAM(S): 2 INJECTION INTRAMUSCULAR; INTRAVENOUS; SUBCUTANEOUS at 23:00

## 2020-10-16 RX ADMIN — Medication 4.89 MICROGRAM(S)/KG/MIN: at 14:54

## 2020-10-16 RX ADMIN — SODIUM CHLORIDE 1000 MILLILITER(S): 9 INJECTION, SOLUTION INTRAVENOUS at 12:32

## 2020-10-16 RX ADMIN — Medication 100 MILLIEQUIVALENT(S): at 13:04

## 2020-10-16 RX ADMIN — SODIUM CHLORIDE 100 MILLILITER(S): 9 INJECTION, SOLUTION INTRAVENOUS at 11:39

## 2020-10-16 RX ADMIN — OCTREOTIDE ACETATE 100 MICROGRAM(S): 200 INJECTION, SOLUTION INTRAVENOUS; SUBCUTANEOUS at 13:51

## 2020-10-16 RX ADMIN — SODIUM CHLORIDE 1000 MILLILITER(S): 9 INJECTION INTRAMUSCULAR; INTRAVENOUS; SUBCUTANEOUS at 08:41

## 2020-10-16 RX ADMIN — HYDROMORPHONE HYDROCHLORIDE 0.5 MILLIGRAM(S): 2 INJECTION INTRAMUSCULAR; INTRAVENOUS; SUBCUTANEOUS at 22:43

## 2020-10-16 RX ADMIN — Medication 650 MILLIGRAM(S): at 02:50

## 2020-10-16 RX ADMIN — Medication 400 MILLIGRAM(S): at 08:27

## 2020-10-16 RX ADMIN — CHLORHEXIDINE GLUCONATE 1 APPLICATION(S): 213 SOLUTION TOPICAL at 14:00

## 2020-10-16 RX ADMIN — Medication 1000 MILLIGRAM(S): at 09:00

## 2020-10-16 RX ADMIN — Medication 650 MILLIGRAM(S): at 03:20

## 2020-10-16 RX ADMIN — Medication 4.89 MICROGRAM(S)/KG/MIN: at 01:46

## 2020-10-16 RX ADMIN — Medication 1000 MILLIGRAM(S): at 04:25

## 2020-10-16 RX ADMIN — PIPERACILLIN AND TAZOBACTAM 200 GRAM(S): 4; .5 INJECTION, POWDER, LYOPHILIZED, FOR SOLUTION INTRAVENOUS at 11:03

## 2020-10-16 RX ADMIN — PIPERACILLIN AND TAZOBACTAM 200 GRAM(S): 4; .5 INJECTION, POWDER, LYOPHILIZED, FOR SOLUTION INTRAVENOUS at 02:45

## 2020-10-16 RX ADMIN — HYDROMORPHONE HYDROCHLORIDE 0.5 MILLIGRAM(S): 2 INJECTION INTRAMUSCULAR; INTRAVENOUS; SUBCUTANEOUS at 15:50

## 2020-10-16 RX ADMIN — OCTREOTIDE ACETATE 100 MICROGRAM(S): 200 INJECTION, SOLUTION INTRAVENOUS; SUBCUTANEOUS at 22:12

## 2020-10-16 RX ADMIN — SODIUM CHLORIDE 3000 MILLILITER(S): 9 INJECTION INTRAMUSCULAR; INTRAVENOUS; SUBCUTANEOUS at 02:20

## 2020-10-16 RX ADMIN — Medication 100 MILLIEQUIVALENT(S): at 12:02

## 2020-10-16 NOTE — CONSULT NOTE ADULT - PROBLEM SELECTOR RECOMMENDATION 8
stage IV incurable state, many pt can live for average up to 5 years with stage IV colon ca   pt critically ill with SBO, FELICITAS, advanced age and advanced cancer - DNR would be appropriate

## 2020-10-16 NOTE — H&P ADULT - ATTENDING COMMENTS
80F w/ HTN, DM, Colon Ca s/p colostomy (4/2019) s/p chemo w/ stage 4 recurrence. Presents w/ weakness and worsening abdominal for the last few days. Pt has had abdominal pain for the last month or so and has been gradually worsening, w/ decreased PO intake. Colostomy output has been watery brown the last few days. Day of presentation, pt became lethargic and poorly responsive. In ED, pt was hypotensive. Given 3L IVF and then started on pressors. Labs significant for FELICITAS, lactate of 9, hyponatremia. CT A/P showed high grade SBO and high grade stenosis of R>L common iliac arteries due to plaques.     - surgery following - recommending OR for decompression; awaiting clearance from anesthesia and will continue w/ medical mgmt  - continue w/ IVF, will give additional bolus as pt still appears dry on exam  - continue w/ zosyn, f/u blood cx  - titrate pressors to MAP >/65  - noted to have mildly elevated troponins w/ some lateral TWI on EKG - likely demand ischemia; will trend CE and get TTE  - FELICITAS, likely pre-renal ATN, monitor UOP and electrolytes closely; trend lactate  - NPO, NGT to ILWS; will attempty octreotide for possible malignant SBO  - check FS q6, ISS coverage  - vascular surgery consulted for stenosis of iliac arteries - will hold off on AC for now   - HSQ for DVT ppx for now  - palliative consult called for pt with multi system organ dysfunction and stage 4 colon CA

## 2020-10-16 NOTE — ED ADULT TRIAGE NOTE - CHIEF COMPLAINT QUOTE
hx stage 4 colon ca, DM. per EMS daughter noted alerted since AM , noted pt was not eating nor taking meds today. In triage pt alert to person, c/o back pain. . unknown baseline. daughter cell [phone]. 22 placed right hand by EMS hx stage 4 colon ca, DM. per EMS daughter noted AMS since AM, unknown baseline. daughter  noted pt was not eating nor taking meds today. In triage pt alert to person, c/o back pain. . unknown baseline. daughter cell [phone]. 22 placed right hand by EMS hx stage 4 colon ca, DM. per EMS daughter noted AMS since AM, unknown baseline. daughter  noted pt was not eating nor taking meds today. In triage pt alert to person, c/o back pain. . daughter cell [phone]. 22 G placed right hand by EMS

## 2020-10-16 NOTE — ED PROVIDER NOTE - CLINICAL SUMMARY MEDICAL DECISION MAKING FREE TEXT BOX
Patient p/w septic shock, BP 60/40, requiring vasopressors.  VSS on drip.  Sepsis alert was called in ER.  Labs, cultures, imaging ordered and reviewed.  IVF and antibiotics were initiated.  CT imaging shows high grade obstruction, also found with iliac thrombosis, patient already on xarelto.  Per d/w Dr. Jolie lopez, no heparin for now.  Patient to be seen by gen surgery this morning.  Patient is to be admitted to the hospital and the case was discussed with the admitting physician.  Any changes in plan, additional imaging/labs, and further work up will be at the discretion of the admitting physician. Per discussion with admitting physician, all necessary consults for admitted patients to be obtained by morning team unless patient requires emergent intervention or medical advice by specialist overnight. Patient p/w septic shock, BP 60/40, requiring vasopressors.  VSS on drip.  Sepsis alert was called in ER.  Labs, cultures, imaging ordered and reviewed.  IVF and antibiotics were initiated.  CT imaging shows high grade obstruction, also found with iliac thrombosis, patient already on xarelto.  Per d/w Dr. Jolie lopez, no heparin for now.  Patient to be seen by gen surgery this morning.  She is more awake and alert, talking to daughter, seems to be mentating well.  Patient is to be admitted to the hospital and the case was discussed with the admitting physician.  Any changes in plan, additional imaging/labs, and further work up will be at the discretion of the admitting physician. Per discussion with admitting physician, all necessary consults for admitted patients to be obtained by morning team unless patient requires emergent intervention or medical advice by specialist overnight.

## 2020-10-16 NOTE — ED ADULT NURSE NOTE - NSIMPLEMENTINTERV_GEN_ALL_ED
Implemented All Fall with Harm Risk Interventions:  [place] to call system. Call bell, personal items and telephone within reach. Instruct patient to call for assistance. Room bathroom lighting operational. Non-slip footwear when patient is off stretcher. Physically safe environment: no spills, clutter or unnecessary equipment. Stretcher in lowest position, wheels locked, appropriate side rails in place. Provide visual cue, wrist band, yellow gown, etc. Monitor gait and stability. Monitor for mental status changes and reorient to person, place, and time. Review medications for side effects contributing to fall risk. Reinforce activity limits and safety measures with patient and family. Provide visual clues: red socks.

## 2020-10-16 NOTE — ED PROVIDER NOTE - CRITICAL CARE PROVIDED
interpretation of diagnostic studies/direct patient care (not related to procedure)/additional history taking/consultation with other physicians/documentation

## 2020-10-16 NOTE — H&P ADULT - NSHPPHYSICALEXAM_GEN_ALL_CORE
Vital Signs Last 24 Hrs  T(C): 36.7 (16 Oct 2020 05:49), Max: 37.9 (16 Oct 2020 02:27)  T(F): 98.1 (16 Oct 2020 05:49), Max: 100.3 (16 Oct 2020 02:27)  HR: 88 (16 Oct 2020 07:34) (84 - 96)  BP: 105/49 (16 Oct 2020 07:34) (64/34 - 165/140)  BP(mean): 66 (16 Oct 2020 07:34) (54 - 147)  RR: 20 (16 Oct 2020 07:34) (13 - 23)  SpO2: 100% (16 Oct 2020 07:34) (91% - 100%)    General:  A&Ox3,Appears stated age, No acute distress,  Head: NC/AT  EENT: PERRLA. EOMI. Conjunctiva and sclera clear. Pharynx clear.  Neck: Supple. No JVD  Lungs: CTA B/l. Nonlabored Respirations  CV: +S1S2, RRR  Abdomen: Soft, Nondistended, mildly tender to palpation in the colostomy site area , liquid stool in the colostomy bag. dressing is c/d/i/  Extremities: Warm and well perfused. 2+ peripheral pulses b/l. Calf soft, nontender b/l. No pedal edema.

## 2020-10-16 NOTE — ED PROVIDER NOTE - PROGRESS NOTE DETAILS
Daughter arrived in ER.  Patient follows at [facility] with Dr. Jeter, history as above with weakness and poor PO intake. Patient supposed to have chemo but has been on hold since before COVID. Daughter arrived in ER.  Patient follows at [facility] with Dr. Jeter, history as above with weakness and poor PO intake. Patient supposed to have chemo but has been on hold since before COVID.  Per daughter patient had been c/o abdominal pain but not vomiting. CT concerning for mulitple findings, high grade SBO.  Gen surgery consult requested, patient not vomiting, will hold off NGT until surgery sees patient.  RIght leg examined post CT results, patient has mild pallor with diminished pulses checked with doppler, good sensory, no swelling, no cyanosis.  Case d/w Dr. 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 Daughter arrived in ER.  Patient follows at [facility] with Dr. Jeter, history as above with weakness and poor PO intake. Patient supposed to have chemo but has been on hold since before COVID.  Per daughter patient had been c/o abdominal pain but not vomiting.  She did not have any falls or head injury.

## 2020-10-16 NOTE — CONSULT NOTE ADULT - PROBLEM SELECTOR RECOMMENDATION 4
poor PO intake, catabolic cancer state and critical illness will drive down albumin  will need to stay NPO for some time  parenteral nutrition has not shown to provide mortality benefit in the medically critically ill pt however pt has a surgical issue at this time will need to follow clinical trajectory to assess what nutritional supports are viable for her

## 2020-10-16 NOTE — H&P ADULT - HISTORY OF PRESENT ILLNESS
Patient presents to the ED for hypotension and weakness. Patient comes from home, has known Stage 4 colon cancer with colostomy status (bag with watery brown stool).  Per EMS patient has not been eating or drinking for the last few days.  No vomiting reported.  No bleeding at home per EMS.  Patient was not hypoxic in the field.  Patient became confused and lethargic tonight, she is very hypotensive.  Daughter called at contact in chart, no answer, message left.    	Relevant PMHx/SHx/SOCHx/FAMH:  	DM, Stage 4 colon cancer, on xarelto for thromboses around colostomy site  Patient family denies EtOH/tobacco/illicit substance use. 81 y/o f with PMHx of HTN, DM Colon Ca Stage IV s/p Ex Lap BR with colostomy presents to the ED with Abd pain x >1 wk. Pt has been having abdominal pain for a long time but became increasingly severe 2 days ago , described as sharp and debilitating . Patient comes from home, has known Stage 4 colon cancer with colostomy status (bag with watery brown stool).  Pt daughter says pt has has not been eating for days  Denies nausea, vomiting , SOB , chest pain lightheadedness or any other complaints at this time . Of note pt is on xarelto for thromboses around colostomy site

## 2020-10-16 NOTE — H&P ADULT - NSICDXPASTMEDICALHX_GEN_ALL_CORE_FT
PAST MEDICAL HISTORY:  Colostomy present     DM (diabetes mellitus)     H/O colon cancer, stage IV     HTN (hypertension)

## 2020-10-16 NOTE — PROCEDURE NOTE - NSPROCDETAILS_GEN_ALL_CORE
ultrasound guidance/sterile dressing applied/guidewire recovered/lumen(s) aspirated and flushed/sterile technique, catheter placed

## 2020-10-16 NOTE — CONSULT NOTE ADULT - SUBJECTIVE AND OBJECTIVE BOX
HPI:  79 y/o f with PMHx of HTN, DM Colon Ca Stage IV s/p Ex Lap BR with colostomy presents to the ED with Abd pain x >1 wk. Pt has been having abdominal pain for a long time but became increasingly severe 2 days ago , described as sharp and debilitating . Patient comes from home, has known Stage 4 colon cancer with colostomy status (bag with watery brown stool).  Pt daughter says pt has has not been eating for days  Denies nausea, vomiting , SOB , chest pain lightheadedness or any other complaints at this time . Of note pt is on xarelto for thromboses around colostomy site   (16 Oct 2020 07:17)    PERTINENT PM/SXH:   Colostomy present    H/O colon cancer, stage IV    DM (diabetes mellitus)    HTN (hypertension)      History of bowel resection    S/P exploratory laparotomy      FAMILY HISTORY:      SOCIAL HISTORY:   Significant other/partner: Yes [ ]  No [ ] Children:  Yes [ ]  No [ ] Spiritism/Spirituality:  Substance hx: Yes[ ]  No [ ]   Tobacco hx:  Yes [ ] No [ ]   Alcohol hx: Yes [ ] No [ ]   Home Opioid hx:  Yes [ x] No [ ]  [ ] I-Stop Reference No:[id number]=10/14/2020	oxycodone-acetaminophen 2.5-325 mg tablet q6 PRN  Living Situation: [ ]Home  [ ]Long term care  [ ]Rehab [ ]Other    ADVANCE DIRECTIVES:    DNR  MOLST  Yes [ ] No [ ]  Living Will  Yes [ ]  No [ ]     [ ] Health Care Proxy(s)  [ ] Surrogate(s)  [ ] Guardian           Name(s): Phone Number(s):    BASELINE (I)ADL(s) (prior to admission):  [place]: [ ]Total  [ ] Moderate [ ]Dependent    Allergies    No Known Allergies    Intolerances    MEDICATIONS  (STANDING):  chlorhexidine 4% Liquid 1 Application(s) Topical <User Schedule>  dextrose 5% + sodium chloride 0.9%. 1000 milliLiter(s) (100 mL/Hr) IV Continuous <Continuous>  dextrose 5%. 1000 milliLiter(s) (50 mL/Hr) IV Continuous <Continuous>  dextrose 50% Injectable 12.5 Gram(s) IV Push once  dextrose 50% Injectable 25 Gram(s) IV Push once  dextrose 50% Injectable 25 Gram(s) IV Push once  heparin   Injectable 5000 Unit(s) SubCutaneous every 8 hours  influenza   Vaccine 0.5 milliLiter(s) IntraMuscular once  insulin lispro (HumaLOG) corrective regimen sliding scale   SubCutaneous every 6 hours  norepinephrine Infusion 0.05 MICROgram(s)/kG/Min (4.89 mL/Hr) IV Continuous <Continuous>  pantoprazole  Injectable 40 milliGRAM(s) IV Push daily  piperacillin/tazobactam IVPB.. 3.375 Gram(s) IV Intermittent every 12 hours    MEDICATIONS  (PRN):  dextrose 40% Gel 15 Gram(s) Oral once PRN Blood Glucose LESS THAN 70 milliGRAM(s)/deciliter  glucagon  Injectable 1 milliGRAM(s) IntraMuscular once PRN Glucose LESS THAN 70 milligrams/deciliter  ondansetron Injectable 4 milliGRAM(s) IV Push every 6 hours PRN Nausea and/or Vomiting  sodium chloride 0.9% lock flush 10 milliLiter(s) IV Push every 1 hour PRN Pre/post blood products, medications, blood draw, and to maintain line patency    PRESENT SYMPTOMS: [ ]Unable to obtain due to poor mentation   Source if other than patient:  [ ]Family   [ ]Team     Pain (Impact on QOL):    Location -   Severity -        Minimal acceptable level (0-10 scale):  Quality:   Onset:   Duration:                 Aggravating factors -  Relieving factors -  Radiation -    PAIN AD Score:     [email or web address] (press ctrl +  left click to view)    Dyspnea:  Yes [ ] No [ ] - [ ]Mild [ ]Moderate [ ]Severe  Anxiety:    Yes [ ] No [ ] - [ ]Mild [ ]Moderate [ ]Severe  Fatigue:    Yes [ ] No [ ] - [ ]Mild [ ]Moderate [ ]Severe  Nausea:    Yes [ ] No [ ] - [ ]Mild [ ]Moderate [ ]Severe                         Loss of appetite: Yes [ ] No [ ] - [ ]Mild [ ]Moderate [ ]Severe             Constipation:  Yes [ ] No [ ] - [ ]Mild [ ]Moderate [ ]Severe  Grief: Yes [ ] No [ ]     Other Symptoms:  [ ]All other review of systems negative     Karnofsky Performance Score/Palliative Performance Status Version 2:         %    http://palliative.info/resource_material/[email or web address].pdf    PHYSICAL EXAM:  Vital Signs Last 24 Hrs  T(C): 36.4 (16 Oct 2020 09:30), Max: 37.9 (16 Oct 2020 02:27)  T(F): 97.6 (16 Oct 2020 09:30), Max: 100.3 (16 Oct 2020 02:27)  HR: 93 (16 Oct 2020 11:00) (84 - 98)  BP: 114/75 (16 Oct 2020 11:00) (64/34 - 165/140)  BP(mean): 84 (16 Oct 2020 11:00) (53 - 147)  RR: 26 (16 Oct 2020 11:00) (13 - 27)  SpO2: 100% (16 Oct 2020 11:00) (91% - 100%) I&O's Summary    16 Oct 2020 07:01  -  16 Oct 2020 11:35  --------------------------------------------------------  IN: 1039.2 mL / OUT: 800 mL / NET: 239.2 mL        GENERAL:  [ ]Alert  [ ]Oriented x   [ ]Lethargic  [ ]Cachexia  [ ]Unarousable  [ ]Verbal  [ ]Non-Verbal  Behavioral:   [ ] Anxiety  [ ] Delirium [ ] Agitation [ ] Other  HEENT:  [ ]Normal   [ ]Dry mouth   [ ]ET Tube/Trach  [ ]Oral lesions  PULMONARY:   [ ]Clear  [ ]Tachypnea  [ ]Audible excessive secretions   [ ]Rhonchi        [ ]Right [ ]Left [ ]Bilateral  [ ]Crackles        [ ]Right [ ]Left [ ]Bilateral  [ ]Wheezing     [ ]Right [ ]Left [ ]Bilateral  CARDIOVASCULAR:    [ ]Regular [ ]Irregular [ ]Tachy  [ ]Sami [ ]Murmur [ ]Other  GASTROINTESTINAL:  [ ]Soft  [ ]Distended   [ ]+BS  [ ]Non tender [ ]Tender  [ ]PEG [ ]OGT/ NGT  Last BM:     GENITOURINARY:  [ ]Normal [ ] Incontinent   [ ]Oliguria/Anuria   [ ]Szymanski  MUSCULOSKELETAL:   [ ]Normal   [ ]Weakness  [ ]Bed/Wheelchair bound [ ]Edema  NEUROLOGIC:   [ ]No focal deficits  [ ] Cognitive impairment  [ ] Dysphagia [ ]Dysarthria [ ] Paresis [ ]Other   SKIN:   [ ]Normal   [ ]Pressure ulcer(s)  [ ]Rash    LABS:                        9.8    8.06  )-----------( 346      ( 16 Oct 2020 10:55 )             27.6   10-16    130<L>  |  99  |  86<H>  ----------------------------<  292<H>  3.1<L>   |  18<L>  |  2.38<H>    Ca    6.3<LL>      16 Oct 2020 10:55  Phos  2.9     10-16  Mg     1.6     10-16    TPro  5.6<L>  /  Alb  1.9<L>  /  TBili  0.5  /  DBili  x   /  AST  175<H>  /  ALT  87<H>  /  AlkPhos  76  10-16  PT/INR - ( 16 Oct 2020 02:30 )   PT: 13.9 sec;   INR: 1.21 ratio         PTT - ( 16 Oct 2020 02:30 )  PTT:32.0 sec    Urinalysis Basic - ( 16 Oct 2020 05:06 )    Color: Yellow / Appearance: Clear / S.010 / pH: x  Gluc: x / Ketone: Negative  / Bili: Negative / Urobili: Negative mg/dL   Blood: x / Protein: 30 mg/dL / Nitrite: Negative   Leuk Esterase: Negative / RBC: 0-2 /HPF / WBC 0-2   Sq Epi: x / Non Sq Epi: Occasional / Bacteria: Few      RADIOLOGY & ADDITIONAL STUDIES:    PROTEIN CALORIE MALNUTRITION PRESENT: [ ] Yes [ ] No  [ ] PPSV2 < or = to 30% [ ] significant weight loss  [ ] poor nutritional intake [ ] catabolic state [ ] anasarca     Albumin, Serum: 1.9 g/dL (10-16-20 @ 02:30)      REFERRALS:   [ ]Chaplaincy  [ ] Hospice  [ ]Child Life  [ ]Social Work  [ ]Case management [ ]Holistic Therapy   Goals of Care Discussion Document: HPI:  79 y/o f with PMHx of HTN, DM Colon Ca Stage IV s/p Ex Lap BR with colostomy presents to the ED with Abd pain x >1 wk. Pt has been having abdominal pain for a long time but became increasingly severe 2 days ago , described as sharp and debilitating . Patient comes from home, has known Stage 4 colon cancer with colostomy status (bag with watery brown stool).  Pt daughter says pt has has not been eating for days  Denies nausea, vomiting , SOB , chest pain lightheadedness or any other complaints at this time . Of note pt is on xarelto for thromboses around colostomy site   (16 Oct 2020 07:17)    PERTINENT PM/SXH:   Colostomy present    H/O colon cancer, stage IV    DM (diabetes mellitus)    HTN (hypertension)      History of bowel resection    S/P exploratory laparotomy      FAMILY HISTORY:      SOCIAL HISTORY:   Significant other/partner: Yes [ ]  No [ x] Children:  Yes [x ]  No [ ] Synagogue/Spirituality: Rastafari  Substance hx: Yes[ ]  No [x ]   Tobacco hx:  Yes [ ] No [x ]   Alcohol hx: Yes [ ] No [ x]   Home Opioid hx:  Yes [ x] No [ ]  [ ] I-Stop Reference No:[id number]=10/14/2020	oxycodone-acetaminophen 2.5-325 mg tablet q6 PRN  Living Situation: [ x]Home  [ ]Long term care  [ ]Rehab [ ]Other    ADVANCE DIRECTIVES:    DNR  MOLST  Yes [ ] No x[ ]  Living Will  Yes [ ]  No [x ]     [ ] Health Care Proxy(s)  [x ] Surrogate(s)  [ ] Guardian           Name(s): Phone Number(s): Mesha [phone]    BASELINE (I)ADL(s) (prior to admission):  [place]: [ ]Total  [x ] Moderate [ ]Dependent    Allergies    No Known Allergies    Intolerances    MEDICATIONS  (STANDING):  chlorhexidine 4% Liquid 1 Application(s) Topical <User Schedule>  dextrose 5% + sodium chloride 0.9%. 1000 milliLiter(s) (100 mL/Hr) IV Continuous <Continuous>  dextrose 5%. 1000 milliLiter(s) (50 mL/Hr) IV Continuous <Continuous>  dextrose 50% Injectable 12.5 Gram(s) IV Push once  dextrose 50% Injectable 25 Gram(s) IV Push once  dextrose 50% Injectable 25 Gram(s) IV Push once  heparin   Injectable 5000 Unit(s) SubCutaneous every 8 hours  influenza   Vaccine 0.5 milliLiter(s) IntraMuscular once  insulin lispro (HumaLOG) corrective regimen sliding scale   SubCutaneous every 6 hours  norepinephrine Infusion 0.05 MICROgram(s)/kG/Min (4.89 mL/Hr) IV Continuous <Continuous>  pantoprazole  Injectable 40 milliGRAM(s) IV Push daily  piperacillin/tazobactam IVPB.. 3.375 Gram(s) IV Intermittent every 12 hours    MEDICATIONS  (PRN):  dextrose 40% Gel 15 Gram(s) Oral once PRN Blood Glucose LESS THAN 70 milliGRAM(s)/deciliter  glucagon  Injectable 1 milliGRAM(s) IntraMuscular once PRN Glucose LESS THAN 70 milligrams/deciliter  ondansetron Injectable 4 milliGRAM(s) IV Push every 6 hours PRN Nausea and/or Vomiting  sodium chloride 0.9% lock flush 10 milliLiter(s) IV Push every 1 hour PRN Pre/post blood products, medications, blood draw, and to maintain line patency    PRESENT SYMPTOMS: [ ]Unable to obtain due to poor mentation   Source if other than patient:  [ ]Family   [ ]Team     Pain (Impact on QOL):  denies pain   Location -   Severity -        Minimal acceptable level (0-10 scale):  Quality:   Onset:   Duration:                 Aggravating factors -  Relieving factors -  Radiation -    PAIN AD Score:     [email or web address] (press ctrl +  left click to view)    Dyspnea:  Yes [ ] No [ x] - [ ]Mild [ ]Moderate [ ]Severe  Anxiety:    Yes [ ] No [x ] - [ ]Mild [ ]Moderate [ ]Severe  Fatigue:    Yes [ ] No [x ] - [ ]Mild [ ]Moderate [ ]Severe  Nausea:    Yes [ ] No [ x] - [ ]Mild [ ]Moderate [ ]Severe                         Loss of appetite: Yes [x ] No [ ] - [ ]Mild [ ]Moderate [ ]Severe             Constipation:  Yes [ ] No [ ] - [ ]Mild [ ]Moderate [ ]Severe  Grief: Yes [ ] No [ ]     Other Symptoms:  [ ]All other review of systems negative     Karnofsky Performance Score/Palliative Performance Status Version 2:        20-10 %    http://palliative.info/resource_material/[email or web address].pdf    PHYSICAL EXAM:  Vital Signs Last 24 Hrs  T(C): 36.4 (16 Oct 2020 09:30), Max: 37.9 (16 Oct 2020 02:27)  T(F): 97.6 (16 Oct 2020 09:30), Max: 100.3 (16 Oct 2020 02:27)  HR: 93 (16 Oct 2020 11:00) (84 - 98)  BP: 114/75 (16 Oct 2020 11:00) (64/34 - 165/140)  BP(mean): 84 (16 Oct 2020 11:00) (53 - 147)  RR: 26 (16 Oct 2020 11:00) (13 - 27)  SpO2: 100% (16 Oct 2020 11:00) (91% - 100%) I&O's Summary    16 Oct 2020 07:01  -  16 Oct 2020 11:35  --------------------------------------------------------  IN: 1039.2 mL / OUT: 800 mL / NET: 239.2 mL        GENERAL:  [ x]Alert  [x ]Oriented x   [ ]Lethargic  [x ]Cachexia  [ ]Unarousable  [x ]Verbal  [ ]Non-Verbal  Behavioral:   [ ] Anxiety  [ ] Delirium [ ] Agitation [ ] Other  HEENT:  [ ]Normal   [ x]Dry mouth   [ ]ET Tube/Trach  [ ]Oral lesions  PULMONARY:   [ ]Clear  [x]xTachypnea  [ ]Audible excessive secretions   [ ]Rhonchi        [ ]Right [ ]Left [ ]Bilateral  [ ]Crackles        [ ]Right [ ]Left [ ]Bilateral  [ ]Wheezing     [ ]Right [ ]Left [ ]Bilateral  CARDIOVASCULAR:    [ ]Regular [ ]Irregular [x ]Tachy  [ ]Sami [ ]Murmur [ ]Other  GASTROINTESTINAL:  [ ]Soft  [ ]Distended   [x ]+BS  [x ]Non tender [ ]Tender  [ ]PEG [ ]OGT/ NGT  Last BM: colostomy with liquid feces in bag    GENITOURINARY:  [ ]Normal [ ] Incontinent   [ ]Oliguria/Anuria   [ x]Szymanski  MUSCULOSKELETAL:   [ ]Normal   [ x]Weakness  [ ]Bed/Wheelchair bound [ ]Edema  NEUROLOGIC:   [x ]No focal deficits  [ ] Cognitive impairment  [ ] Dysphagia [ ]Dysarthria [ ] Paresis [ ]Other   SKIN:   [ ]Normal   [ ]Pressure ulcer(s)  [ ]Rash    LABS:                        9.8    8.06  )-----------( 346      ( 16 Oct 2020 10:55 )             27.6   10-16    130<L>  |  99  |  86<H>  ----------------------------<  292<H>  3.1<L>   |  18<L>  |  2.38<H>    Ca    6.3<LL>      16 Oct 2020 10:55  Phos  2.9     10-16  Mg     1.6     10-16    TPro  5.6<L>  /  Alb  1.9<L>  /  TBili  0.5  /  DBili  x   /  AST  175<H>  /  ALT  87<H>  /  AlkPhos  76  10-16  PT/INR - ( 16 Oct 2020 02:30 )   PT: 13.9 sec;   INR: 1.21 ratio         PTT - ( 16 Oct 2020 02:30 )  PTT:32.0 sec    Urinalysis Basic - ( 16 Oct 2020 05:06 )    Color: Yellow / Appearance: Clear / S.010 / pH: x  Gluc: x / Ketone: Negative  / Bili: Negative / Urobili: Negative mg/dL   Blood: x / Protein: 30 mg/dL / Nitrite: Negative   Leuk Esterase: Negative / RBC: 0-2 /HPF / WBC 0-2   Sq Epi: x / Non Sq Epi: Occasional / Bacteria: Few      RADIOLOGY & ADDITIONAL STUDIES:   < from: CT Angio Abdomen and Pelvis w/ IV Cont (10.16.20 @ 04:50) >    EXAM:  CT ANGIO ABD PELV (W)AW IC                          EXAM:  CT ANGIO CHEST (W)AW IC                            PROCEDURE DATE:  10/16/2020          INTERPRETATION:  INDICATION: Hypotension. Dissection study.    TECHNIQUE: Noncontrast transaxial CT images were acquired through the chest. Subsequently, transaxial images were obtained of the chest, abdomen, and pelvis using CT angiographic protocol.    Total of 85ml of Omnipaque-350  was administered intravenously without complication. Sagittal and coronal reformatted images were obtained from the source data. Axial MIP images were provided.    COMPARISON: None.    FINDINGS:    CT CHEST    There is no evidence of aortic dissection, aortic aneurysm, or mural hematoma. Atheromatous calcification along the aorta noted.    Enlarged main pulmonary artery may reflect pulmonary hypertension the appropriate clinical setting.    The heart is within normal limits. No pericardial effusion. No intrathoracic adenopathy.    No focal parenchymal consolidation. Minimal bibasilar dependent changes.    No pleural effusion or pneumothorax.    Chest wall and thyroid gland are unremarkable.    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  Near complete occlusion of the right external iliac artery. Trace flow identified in the right femoral artery.    Multiple dilated loops of small bowel with air-fluid level with abrupt caliber changein transition point in the lower abdomen, just left of midline (3-165, 602-69). No pneumatosis or free air. Changes from left lower quadrant colostomy noted.    Calcified granuloma in the liver noted. Otherwise, the liver spleen, pancreas are unremarkable. Gallbladder is unremarkable.    Nodular thickening of the adrenal glands bilaterally, nonspecific. Coarse calcification along the lateral limb of the right adrenal gland.    The kidneys enhance symmetrically without hydronephrosis. Urinary bladder is mildly distended.    Multilevel disc degeneration vacuum changes of, sclerosis in the lower lumbar spine. Age-indeterminate loss of vertebral body height along the superior and inferior T9 vertebral body and inferior T12 vertebral body.    IMPRESSION:    High-grade small bowel obstruction with transition point in the lower abdomen, just left of midline.    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    Age-indeterminate T9 and T12 endplate compression fracture deformity. Correlate with site of pain.    Enlarged main pulmonary artery may reflect pulmonary hypertension the appropriate clinical setting.    No evidence of aortic dissection or aneurysm.            RAUDEL DELONG M.D., ATTENDING RADIOLOGIST  This document has been electronically signed. Oct 16 2020  5:32AM    < end of copied text >    < from: Xray Chest 1 View- PORTABLE-Urgent (Xray Chest 1 View- PORTABLE-Urgent .) (10.16.20 @ 11:22) >    EXAM:  XR CHEST PORTABLE URGENT 1V                            PROCEDURE DATE:  10/16/2020          INTERPRETATION:  CLINICAL STATEMENT: Follow-up chest pain.    TECHNIQUE: AP view of the chest.    COMPARISON: 10/16/2020 at 8:36 AM    FINDINGS/  IMPRESSION:  Feeding tube again noted. New left central line noted tip overlying the region of SVC. No pneumothorax.    No new consolidation or pleural effusion.    Heart size within normal limits.            SAM BAJWA M.D. ATTENDING RADIOLOGIST  This document has been electronically signed. Oct 16 2020 11:23A    < end of copied text >  PROTEIN CALORIE MALNUTRITION PRESENT: [x ] Yes [ ] No  [x ] PPSV2 < or = to 30% [ x] significant weight loss  [ x] poor nutritional intake [x ] catabolic state [ ] anasarca     Albumin, Serum: 1.9 g/dL (10-16-20 @ 02:30)      REFERRALS:   [ ]Chaplaincy  [ ] Hospice  [ ]Child Life  [ ]Social Work  [ ]Case management [ ]Holistic Therapy   Goals of Care Discussion Document:

## 2020-10-16 NOTE — CONSULT NOTE ADULT - SUBJECTIVE AND OBJECTIVE BOX
Vascular Attending:    10-06-20 1 PM  The pt is seen and examiend and evaluated for iliac artery stenossi. Pt is admitted for Septic   shock, H/O stage 4 colon cancer, Hypotension, and possible SBO, on CT scan of abd and pelvis was found to have right iliac artery stenosis. The report and the images are reviewed by me and the pt examined with focus on LE.    HPI:  79 y/o f with PMHx of HTN, DM Colon Ca Stage IV s/p Ex Lap BR with colostomy presents to the ED with Abd pain x >1 wk. Pt has been having abdominal pain for a long time but became increasingly severe 2 days ago , described as sharp and debilitating . Patient comes from home, has known Stage 4 colon cancer with colostomy status (bag with watery brown stool).  Pt daughter says pt has has not been eating for days  Denies nausea, vomiting , SOB , chest pain lightheadedness or any other complaints at this time . Of note pt is on xarelto for thromboses around colostomy site   (16 Oct 2020 07:17)      PAST MEDICAL & SURGICAL HISTORY:  Colostomy present    H/O colon cancer, stage IV    DM (diabetes mellitus)    HTN (hypertension)    History of bowel resection    S/P exploratory laparotomy          MEDICATIONS  (STANDING):  chlorhexidine 4% Liquid 1 Application(s) Topical <User Schedule>  dexAMETHasone  Injectable 4 milliGRAM(s) IV Push every 12 hours  dextrose 5% + sodium chloride 0.9%. 1000 milliLiter(s) (100 mL/Hr) IV Continuous <Continuous>  dextrose 5%. 1000 milliLiter(s) (50 mL/Hr) IV Continuous <Continuous>  dextrose 50% Injectable 12.5 Gram(s) IV Push once  dextrose 50% Injectable 25 Gram(s) IV Push once  dextrose 50% Injectable 25 Gram(s) IV Push once  heparin   Injectable 5000 Unit(s) SubCutaneous every 8 hours  influenza   Vaccine 0.5 milliLiter(s) IntraMuscular once  insulin lispro (HumaLOG) corrective regimen sliding scale   SubCutaneous every 6 hours  norepinephrine Infusion 0.05 MICROgram(s)/kG/Min (4.89 mL/Hr) IV Continuous <Continuous>  octreotide  Injectable 100 MICROGram(s) SubCutaneous three times a day  pantoprazole  Injectable 40 milliGRAM(s) IV Push daily  piperacillin/tazobactam IVPB.. 3.375 Gram(s) IV Intermittent every 12 hours    MEDICATIONS  (PRN):  dextrose 40% Gel 15 Gram(s) Oral once PRN Blood Glucose LESS THAN 70 milliGRAM(s)/deciliter  glucagon  Injectable 1 milliGRAM(s) IntraMuscular once PRN Glucose LESS THAN 70 milligrams/deciliter  HYDROmorphone  Injectable 0.5 milliGRAM(s) IV Push every 4 hours PRN Severe Pain (7 - 10)  ondansetron Injectable 4 milliGRAM(s) IV Push every 6 hours PRN Nausea and/or Vomiting  sodium chloride 0.9% lock flush 10 milliLiter(s) IV Push every 1 hour PRN Pre/post blood products, medications, blood draw, and to maintain line patency      Allergies    No Known Allergies    Intolerances        SOCIAL HISTORY:      Vital Signs Last 24 Hrs  T(C): 36.9 (16 Oct 2020 12:30), Max: 37.9 (16 Oct 2020 02:27)  T(F): 98.4 (16 Oct 2020 12:30), Max: 100.3 (16 Oct 2020 02:27)  HR: 97 (16 Oct 2020 16:30) (84 - 100)  BP: 101/57 (16 Oct 2020 13:45) (64/34 - 165/140)  BP(mean): 68 (16 Oct 2020 13:45) (53 - 147)  RR: 24 (16 Oct 2020 16:30) (13 - 27)  SpO2: 99% (16 Oct 2020 16:30) (91% - 100%)    P/E:- Awake, ?oriented. Pt is on Vasopressors  CAROTIDS:- Bilateral carotids with no Bruits. No scars of previous catheterisation.  UPPER EXTREMITIES:- Bilateral radial artery pulses are normal and no ischemia of the Hands. No edema of the arms.  ABDOMEN:- No pulsatile mass in the abdomen and no ascites.  LOWER EXTREMITIES:-  Both feet are cool to touch, No cyanosis ans no gangrene. Both fem pulses are weak to palpate, Pulses as below      Pulses:   Right:                                                                          Left:  FEM [ ]2+ [y ]1+ [ ]doppler                                             FEM [ ]2+ [y ]1+ [ ]doppler    POP [ ]2+ [ ]1+ [y ]doppler                                             POP [ ]2+ [ ]1+ [ y]doppler    DP [ ]2+ [ ]1+ [ ]doppler                                                DP [ ]2+ [ ]1+ [y ]doppler  PT[ ]2+ [ ]1+ [y ]doppler                                                  PT [ ]2+ [ ]1+ y[ ]doppler      LABS:                        9.8    8.06  )-----------( 346      ( 16 Oct 2020 10:55 )             27.6     10-16    130<L>  |  99  |  86<H>  ----------------------------<  292<H>  3.1<L>   |  18<L>  |  2.38<H>    Ca    6.3<LL>      16 Oct 2020 10:55  Phos  2.9     10-16  Mg     1.6     10-16    TPro  5.6<L>  /  Alb  1.9<L>  /  TBili  0.5  /  DBili  x   /  AST  175<H>  /  ALT  87<H>  /  AlkPhos  76  10-16    PT/INR - ( 16 Oct 2020 02:30 )   PT: 13.9 sec;   INR: 1.21 ratio         PTT - ( 16 Oct 2020 02:30 )  PTT:32.0 sec  Urinalysis Basic - ( 16 Oct 2020 05:06 )    Color: Yellow / Appearance: Clear / S.010 / pH: x  Gluc: x / Ketone: Negative  / Bili: Negative / Urobili: Negative mg/dL   Blood: x / Protein: 30 mg/dL / Nitrite: Negative   Leuk Esterase: Negative / RBC: 0-2 /HPF / WBC 0-2   Sq Epi: x / Non Sq Epi: Occasional / Bacteria: Few        RADIOLOGY & ADDITIONAL STUDIES    EXAM:  CT ANGIO ABD PELV (W)AW IC                          EXAM:  CT ANGIO CHEST (W)AW IC                            PROCEDURE DATE:  10/16/2020          INTERPRETATION:  INDICATION: Hypotension. Dissection study.    TECHNIQUE: Noncontrast transaxial CT images were acquired through the chest. Subsequently, transaxial images were obtained of the chest, abdomen, and pelvis using CT angiographic protocol.    Total of 85ml of Omnipaque-350  was administered intravenously without complication. Sagittal and coronal reformatted images were obtained from the source data. Axial MIP images were provided.    COMPARISON: None.    FINDINGS:    CT CHEST    There is no evidence of aortic dissection, aortic aneurysm, or mural hematoma. Atheromatous calcification along the aorta noted.    Enlarged main pulmonary artery may reflect pulmonary hypertension the appropriate clinical setting.    The heart is within normal limits. No pericardial effusion. No intrathoracic adenopathy.    No focal parenchymal consolidation. Minimal bibasilar dependent changes.    No pleural effusion or pneumothorax.    Chest wall and thyroid gland are unremarkable.    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  Near complete occlusion of the right external iliac artery. Trace flow identified in the right femoral artery.    Multiple dilated loops of small bowel with air-fluid level with abrupt caliber changein transition point in the lower abdomen, just left of midline (3-165, 602-69). No pneumatosis or free air. Changes from left lower quadrant colostomy noted.    Calcified granuloma in the liver noted. Otherwise, the liver spleen, pancreas are unremarkable. Gallbladder is unremarkable.    Nodular thickening of the adrenal glands bilaterally, nonspecific. Coarse calcification along the lateral limb of the right adrenal gland.    The kidneys enhance symmetrically without hydronephrosis. Urinary bladder is mildly distended.    Multilevel disc degeneration vacuum changes of, sclerosis in the lower lumbar spine. Age-indeterminate loss of vertebral body height along the superior and inferior T9 vertebral body and inferior T12 vertebral body.    IMPRESSION:    High-grade small bowel obstruction with transition point in the lower abdomen, just left of midline.    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    Age-indeterminate T9 and T12 endplate compression fracture deformity. Correlate with site of pain.    Enlarged main pulmonary artery may reflect pulmonary hypertension the appropriate clinical setting.    No evidence of aortic dissection or aneurysm.            RAUDEL DELONG M.D., ATTENDING RADIOLOGIST  This document has been electronically signed. Oct 16 2020  5:32AM    Impression and Plan: Patient has chronic , PAD and iliac artery stenosis more on the right then the left.There is no acute Thrombosis or embolus. However in the current setting and medical issues with sepsis, and vasopressors and Hypotnesion, There is potential for the compromising the circulation to the legs ,especially right foot.  Recommend, well hydration, wean off the vasopressors if possible and will follow. If and when the pt becomes stable should have angio and possible Angioplasty.

## 2020-10-16 NOTE — ED ADULT NURSE NOTE - NS ED NURSE RECORD ANOTHER HT AND WT
2019      Re: Edy Lauren  [address]  [facility] [address]   17    This is to certify that Edy Lauren has been under my care during .  Mom, Tran Lauren, bring child in for appointments.                SIGNATURE:___________________________________________      Dr. Sujatha Santillan       Pediatrics  [address] [facility] [address]  [phone]         Yes

## 2020-10-16 NOTE — ED ADULT NURSE NOTE - OBJECTIVE STATEMENT
pt presents to the ED c/o ams. pt presents for hypotension and weakness. as per daughter, has stage 4 colon cancer and has a colostomy status presents with brown stool out in yesterday. Pt has not been eating or drinking at home. pt is lethargic and confused at this time. on cardiac monitor. dr. tam at bedside. will continue to monitor

## 2020-10-16 NOTE — PRE-OP CHECKLIST - SELECT TESTS ORDERED
Type and Screen/CXR/Urinalysis/BMP/CBC/COVID CBC/Urinalysis/EKG/BMP/Type and Screen/CXR/COVID BMP/Urinalysis/CBC/CMP/EKG/PT/PTT/INR/CXR/COVID/Type and Screen

## 2020-10-16 NOTE — CONSULT NOTE ADULT - SUBJECTIVE AND OBJECTIVE BOX
GENERAL SURGERY CONSULT    81yo F with PMH/PSH of DM, Stage 4 colon cancer, exp lap with bowel resection and ostomy (2019, at [facility]), on Xarelto for thrombosis around ostomy arrives to the ED c/o of abdominal pain, with confusion and lethargy. Daughter states patient has been having abdominal pain for the past few weeks with increased severity in past 2 days. Abdominal pain was associated with decreased appetite and PO liquid intake x3 weeks, and watery output from ostomy. Daughter states her mother usually has a good appetite. Daughter also states that ostomy output is usually of a thicker consistency "like applesauce". Denies any N/V, blood in ostomy bag, CP, SOB.       PAST MEDICAL & SURGICAL HISTORY:  Ostomy present  H/O colon cancer, stage IV  DM   HTN   History of bowel resection  S/P exploratory laparotomy      Review of Systems:  review of systems located within [facility]    MEDICATIONS  (STANDING):  chlorhexidine 4% Liquid 1 Application(s) Topical <User Schedule>  dextrose 5% + sodium chloride 0.45%. 1000 milliLiter(s) (90 mL/Hr) IV Continuous <Continuous>  dextrose 5%. 1000 milliLiter(s) (50 mL/Hr) IV Continuous <Continuous>  dextrose 50% Injectable 12.5 Gram(s) IV Push once  dextrose 50% Injectable 25 Gram(s) IV Push once  dextrose 50% Injectable 25 Gram(s) IV Push once  heparin   Injectable 5000 Unit(s) SubCutaneous every 8 hours  insulin lispro (HumaLOG) corrective regimen sliding scale   SubCutaneous every 6 hours  norepinephrine Infusion 0.05 MICROgram(s)/kG/Min (4.89 mL/Hr) IV Continuous <Continuous>  pantoprazole  Injectable 40 milliGRAM(s) IV Push daily  piperacillin/tazobactam IVPB. 3.375 Gram(s) IV Intermittent once  piperacillin/tazobactam IVPB.. 3.375 Gram(s) IV Intermittent every 12 hours    MEDICATIONS  (PRN):  dextrose 40% Gel 15 Gram(s) Oral once PRN Blood Glucose LESS THAN 70 milliGRAM(s)/deciliter  glucagon  Injectable 1 milliGRAM(s) IntraMuscular once PRN Glucose LESS THAN 70 milligrams/deciliter  ondansetron Injectable 4 milliGRAM(s) IV Push every 6 hours PRN Nausea and/or Vomiting      Allergies  No Known Allergies    Intolerances        SOCIAL HISTORY          Smoking: Yes [ ]  No [X]   ______pk yrs          ETOH  Yes [ ]  No [X]  Social [ ]    FAMILY HISTORY:  Unknown    Vital Signs Last 24 Hrs  T(C): 37.3 (16 Oct 2020 08:07), Max: 37.9 (16 Oct 2020 02:27)  T(F): 99.1 (16 Oct 2020 08:07), Max: 100.3 (16 Oct 2020 02:27)  HR: 89 (16 Oct 2020 09:15) (84 - 97)  BP: 132/58 (16 Oct 2020 09:15) (64/34 - 165/140)  BP(mean): 77 (16 Oct 2020 09:15) (53 - 147)  RR: 22 (16 Oct 2020 09:15) (13 - 23)  SpO2: 97% (16 Oct 2020 09:15) (91% - 100%)    Physical Exam:  General:  mildly distressed, appears younger than stated age  Eyes : conjunctiva clear  Neck:  WNL, no JVD  Mouth: mucus membrane moist, whitening of tongue noted  Respiratory: on 4L NC, nonlabored respirations, CTAB  Cardiovascular: tachycardic, S1S2  Abdomen: tense abdomen, diffusely tender to palpation, nondistended. ostomy with brown watery output. NGT inserted with feculent output.  : lo catheter in place draining clear, yellow urine  Extremities: no edema, non tender calves    Skin: healed midline incision in abdomen with ostomy located at left abdominal wall, ecchymosis on Right forearm. No other rashes or lesions noted.  Neuro/Psych:  AAO x3      LABS:                        13.0   10.87 )-----------( 460      ( 16 Oct 2020 02:30 )             37.6     10-16    124<L>  |  86<L>  |  106<H>  ----------------------------<  139<H>  4.4   |  21<L>  |  3.47<H>    Ca    7.5<L>      16 Oct 2020 02:30    TPro  5.6<L>  /  Alb  1.9<L>  /  TBili  0.5  /  DBili  x   /  AST  175<H>  /  ALT  87<H>  /  AlkPhos  76  10-16    TESTS: Lactate   Lactate, Blood: 9.0: TYPE:(C=Critical, N=Notification, A=Abnormal) C  on 10/16/20 @03:38   Lactate, Blood: 6.0: TYPE:(C=Critical, N=Notification, A=Abnormal) C on 10/16/20 @ 05:34     PT/INR - ( 16 Oct 2020 02:30 )   PT: 13.9 sec;   INR: 1.21 ratio    PTT - ( 16 Oct 2020 02:30 )  PTT:32.0 sec  Urinalysis Basic - ( 16 Oct 2020 05:06 )    Color: Yellow / Appearance: Clear / S.010 / pH: x  Gluc: x / Ketone: Negative  / Bili: Negative / Urobili: Negative mg/dL   Blood: x / Protein: 30 mg/dL / Nitrite: Negative   Leuk Esterase: Negative / RBC: 0-2 /HPF / WBC 0-2   Sq Epi: x / Non Sq Epi: Occasional / Bacteria: Few    RADIOLOGY & ADDITIONAL STUDIES:    CT Angio Abdomen and Pelvis w/ IV Cont (10.16.20 @ 04:50)       INTERPRETATION:  INDICATION: Hypotension. Dissection study.    TECHNIQUE: Noncontrast transaxial CT images were acquired through the chest. Subsequently, transaxial images were obtained of the chest, abdomen, and pelvis using CT angiographic protocol.    Total of 85ml of Omnipaque-350  was administered intravenously without complication. Sagittal and coronal reformatted images were obtained from the source data. Axial MIP images were provided.      FINDINGS:  CT CHEST    There is no evidence of aortic dissection, aortic aneurysm, or mural hematoma. Atheromatous calcification along the aorta noted.  Enlarged main pulmonary artery may reflect pulmonary hypertension the appropriate clinical setting.  The heart is within normal limits. No pericardial effusion. No intrathoracic adenopathy.  No focal parenchymal consolidation. Minimal bibasilar dependent changes.  No pleural effusion or pneumothorax.  Chest wall and thyroid gland are unremarkable.    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  Near complete occlusion of the right external iliac artery. Trace flow identified in the right femoral artery.  Multiple dilated loops of small bowel with air-fluid level with abrupt caliber changein transition point in the lower abdomen, just left of midline (3-165, 602-69). No pneumatosis or free air. Changes from left lower quadrant colostomy noted.  Calcified granuloma in the liver noted. Otherwise, the liver spleen, pancreas are unremarkable. Gallbladder is unremarkable.  Nodular thickening of the adrenal glands bilaterally, nonspecific. Coarse calcification along the lateral limb of the right adrenal gland.  The kidneys enhance symmetrically without hydronephrosis. Urinary bladder is mildly distended.  Multilevel disc degeneration vacuum changes of, sclerosis in the lower lumbar spine. Age-indeterminate loss of vertebral body height along the superior and inferior T9 vertebral body and inferior T12 vertebral body.    IMPRESSION:    High-grade small bowel obstruction with transition point in the lower abdomen, just left of midline.  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  Age-indeterminate T9 and T12 endplate compression fracture deformity. Correlate with site of pain.  Enlarged main pulmonary artery may reflect pulmonary hypertension the appropriate clinical setting.  No evidence of aortic dissection or aneurysm.    RAUDEL DELONG M.D., ATTENDING RADIOLOGIST      Xray Chest 1 View- PORTABLE-Urgent (Xray Chest 1 View- PORTABLE-Urgent .) (10.16.20 @ 09:00)     TECHNIQUE: AP view of the lower chest.    FINDINGS:  NG tube noted with tip under the diaphragm overlying expected region of stomach.    IMPRESSION:  NG tube as described above.    SAM BAJWA M.D. ATTENDING RADIOLOGIST

## 2020-10-16 NOTE — ED ADULT NURSE REASSESSMENT NOTE - NS ED NURSE REASSESS COMMENT FT1
Pt received per night shift ANNIE Wheeler. PT on levophed drip 0.5mcg/kg at this time. PT appears to be in no distress at this time. pt on NC4L sating at Pt received per night shift ANNIE Wheeler. PT on levophed drip 0.5mcg/kg at this time. PT appears to be in no distress at this time. pt on NC4L. pt noted to have a lo, and a colostomy bag in place. Safety measures in place. will continue to monitor.

## 2020-10-16 NOTE — PRE-OP CHECKLIST - DENTURES
Visit Information Date & Time Provider Department Dept. Phone Encounter #  
 2017  9:30 AM Kristine Andino, [facility] Surgical [address] [id number] Follow-up Instructions Return in about 1 year (around 2018), or if symptoms worsen or fail to improve. Your Appointments 2017  9:00 AM  
Any with Nikky Peters MD  
[address] [place] [address] and Primary Care [address] [place] [address]) Appt Note: 6mo f/u  
 [address] [facility]  
  
   
 [id number] Upcoming Health Maintenance Date Due Pneumococcal 19-64 Medium Risk (1 of 1 - PPSV23) 3/11/1976 DTaP/Tdap/Td series (1 - Tdap) 3/11/1978 FOBT Q 1 YEAR AGE 50-75 2016 INFLUENZA AGE 9 TO ADULT 2016 MICROALBUMIN Q1 2016 FOOT EXAM Q1 3/7/2017 EYE EXAM RETINAL OR DILATED Q1 3/24/2017 HEMOGLOBIN A1C Q6M 5/10/2017 LIPID PANEL Q1 10/14/2017 PAP AKA CERVICAL CYTOLOGY 2018 BREAST CANCER SCRN MAMMOGRAM 10/19/2018 Allergies as of 2017  Review Complete On: 2017 By: Kristine Andino MD  
  
 Severity Noted Reaction Type Reactions Dilaudid [Hydromorphone]  10/02/2013    Nausea Only Ultram [Tramadol]  10/02/2013    Nausea Only Current Immunizations  Never Reviewed No immunizations on file. Not reviewed this visit You Were Diagnosed With   
  
 Codes Comments Encounter for gynecological examination without abnormal finding    -  Primary ICD-10-CM: E31.856 ICD-9-CM: V72.31 Symptomatic menopausal or female climacteric states     ICD-10-CM: N95.1 ICD-9-CM: 627.2 History of      ICD-10-CM: E09.331 ICD-9-CM: V45.89 Diabetes mellitus type 2, diet-controlled ([facility] [address]     ICD-10-CM: E11.9 ICD-9-CM: 250.00 Essential hypertension     ICD-10-CM: I10 
ICD-9-CM: 401.9 Bruit of right carotid artery     ICD-10-CM: R09.89 ICD-9-CM: 674. 9 Vitals BP Pulse Temp Resp Height(growth percentile) Weight(growth percentile) 121/66 69 97.8 °F (36.6 °C) (Oral) 14 5' 1\" (1.549 m) 178 lb (80.7 kg) SpO2 BMI OB Status Smoking Status 99% 33.63 kg/m2 Postmenopausal Never Smoker Vitals History BMI and BSA Data Body Mass Index Body Surface Area  
 33.63 kg/m 2 1.86 m 2 Preferred Pharmacy Pharmacy Name Phone [id number], [address] [phone] Your Updated Medication List  
  
   
This list is accurate as of: 1/6/17 11:14 AM.  Always use your most recent med list.  
  
  
  
  
 CALCIUM PO Take  by mouth. cholecalciferol 5,000 unit capsule Commonly known as:  VITAMIN D3  
TAKE ONE CAPSULE BY MOUTH EVERY DAY  
  
 glucose blood VI test strips strip Commonly known as:  FREESTYLE INSULINX TEST STRIPS Use to test blood sugar twice daily. oxyCODONE-acetaminophen 5-325 mg per tablet Commonly known as:  PERCOCET Take 1 Tab by mouth every eight (8) hours as needed for Pain. Max Daily Amount: 3 Tabs. pravastatin 40 mg tablet Commonly known as:  PRAVACHOL Take 1 Tab by mouth nightly. sAXagliptin 5 mg Tab tablet Commonly known as:  ONGLYZA TAKE 1 TABLET BY MOUTH EVERY DAY  
  
 spironolactone-hydrochlorothiazide 25-25 mg per tablet Commonly known as:  ALDACTAZIDE TAKE 1 TABLET BY MOUTH ONCE A DAY  
  
 VITAMIN A PO Take  by mouth. VITAMIN B-12 PO Take  by mouth. We Performed the Following PAP, LB, RFX HPV [id number] CPT(R)] Follow-up Instructions Return in about 1 year (around 1/6/2018), or if symptoms worsen or fail to improve. Introducing [facility]! Dear Jerlyn Gilford: Thank you for requesting a [employer] account. Our records indicate that you already have an active [employer] account. You can access your account anytime at [email or web address] Did you know that you can access your hospital and ER discharge instructions at any time in [employer]? You can also review all of your test results from your hospital stay or ER visit. Additional Information If you have questions, please visit the Frequently Asked Questions section of the [employer] website at [email or web address] Remember, [employer] is NOT to be used for urgent needs. For medical emergencies, dial 911. Now available from your iPhone and Android! Please provide this summary of care documentation to your next provider. Your primary care clinician is listed as Petra De La Rosa. If you have any questions after today's visit, please call [phone]. yes/dentures  removed

## 2020-10-16 NOTE — H&P ADULT - NSHPLABSRESULTS_GEN_ALL_CORE
13.0   10.87 )-----------( 460      ( 16 Oct 2020 02:30 )             37.6   10-16    124<L>  |  86<L>  |  106<H>  ----------------------------<  139<H>  4.4   |  21<L>  |  3.47<H>    Ca    7.5<L>      16 Oct 2020 02:30    TPro  5.6<L>  /  Alb  1.9<L>  /  TBili  0.5  /  DBili  x   /  AST  175<H>  /  ALT  87<H>  /  AlkPhos  76  10-16    < from: CT Angio Abdomen and Pelvis w/ IV Cont (10.16.20 @ 04:50) >    FINDINGS:    CT CHEST    There is no evidence of aortic dissection, aortic aneurysm, or mural hematoma. Atheromatous calcification along the aorta noted.    Enlarged main pulmonary artery may reflect pulmonary hypertension the appropriate clinical setting.    The heart is within normal limits. No pericardial effusion. No intrathoracic adenopathy.    No focal parenchymal consolidation. Minimal bibasilar dependent changes.    No pleural effusion or pneumothorax.    Chest wall and thyroid gland are unremarkable.    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  Near complete occlusion of the right external iliac artery. Trace flow identified in the right femoral artery.    Multiple dilated loops of small bowel with air-fluid level with abrupt caliber changein transition point in the lower abdomen, just left of midline (3-165, 602-69). No pneumatosis or free air. Changes from left lower quadrant colostomy noted.    Calcified granuloma in the liver noted. Otherwise, the liver spleen, pancreas are unremarkable. Gallbladder is unremarkable.    Nodular thickening of the adrenal glands bilaterally, nonspecific. Coarse calcification along the lateral limb of the right adrenal gland.    The kidneys enhance symmetrically without hydronephrosis. Urinary bladder is mildly distended.    Multilevel disc degeneration vacuum changes of, sclerosis in the lower lumbar spine. Age-indeterminate loss of vertebral body height along the superior and inferior T9 vertebral body and inferior T12 vertebral body.    IMPRESSION:    High-grade small bowel obstruction with transition point in the lower abdomen, just left of midline.    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    Age-indeterminate T9 and T12 endplate compression fracture deformity. Correlate with site of pain.    Enlarged main pulmonary artery may reflect pulmonary hypertension the appropriate clinical setting.    No evidence of aortic dissection or aneurysm.        < end of copied text >

## 2020-10-16 NOTE — CONSULT NOTE ADULT - ATTENDING COMMENTS
Mrs. Garcia was examined. 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 Hdz. 3 weeks of abdominal pain, anorexia, diarrhea via colostomy. On exam she is hypotensive, tachycardic, abdominal pain diffuse, nondistended, colostomy with liquid stool. She is currently in multisystem organ failure, rise in troponin, FELICITAS, elevated lactate. Have discussed with the patient and daughter regarding options. She is high risk for mortality. Recommend resuscitation, broad spectrum antibiotics, trend lactate, resuscitate to normal q6 hours, hyponatremia correction, palliative care consult, nephrology consultation appreciated, cardiology consult/TTE. HCP/Daugter in agreement with plan.
Thank you for the courtesy of this consult, will follow with you.

## 2020-10-16 NOTE — CONSULT NOTE ADULT - ASSESSMENT
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 Noted several weeks of abdominal pain, anorexia, diarrhea via colostomy.     Evidence of peritoneal abdomen, on pressor support, tachycardia, with evidence of multisystem organ failure, mildly elevated troponin, FELICITAS, elevated lactate. +Iliac stenoses bilaterally.    Patient being evaluated for possible surgical intervention today, no evidence of any AMI, small troponin elevation probably ischemic demand in setting of SBO/bowel ischemia. Normal LVEF on preliminary POCUS, no overt cardiac contraindications to planned (emergent) abdominal surgery.    Based on Revised Cardiac Risk Index patient is at moderate to high risk for this surgical intervention (risk of MI, VT, CVA >7%), however, benefit in this case may outweigh inherent risks. This needs to be assessed by surgical team.    Advised close hemodynamic monitoring, fluids as needed. Case discussed with anesthesia and ICU team at length. Patient family to be made aware of risks. Holding NOAC , resume when cleared by surgery.
81yo F with PMH/PSH of DM, Stage 4 colon cancer, exp lap with bowel resection and ostomy (2019, at [facility]), on Xarelto for thrombosis around ostomy arrives to ED with abdominal pain, with confusion and lethargy. A/w SBO and High grade stenosis of common iliacs. Hyponatremic.  NGT placed in ED by surgical team with feculent output. With lo.  On Levophed and admitted to CCU.      PLAN  - ex lap for OR with Dr. Dasilva planned today  - NPO  - continue IVF  - continue Zosyn  - continue NGT, continue lo  - continue dvt ppx  - continue medical management as per CCU  - f/u lactate  - f/u repeat CBC, BMP, Mg, Phos; hyponatremic- replete as needed  - f/u T&S; 2U PRBC on hold for OR  - f/u EKG  - will discuss with Dr. Dasilva    
79 y/o f with PMHx of HTN, DM Colon Ca Stage IV s/p Ex Lap w bowel resection and colostomy creation admitted to ICU with high grade SBO, FELICITAS, lactemia and septic shock. Palliative Care consulted for complex symptom management and GOC.

## 2020-10-16 NOTE — CONSULT NOTE ADULT - PROBLEM SELECTOR RECOMMENDATION 6
volume resus and pressor support  arterial line being attempted  empiric antibiotics to cover GI mayur (GNR, anaerobes)

## 2020-10-16 NOTE — CONSULT NOTE ADULT - SUBJECTIVE AND OBJECTIVE BOX
[facility] NEPHROLOGY SERVICES, [facility]  NEPHROLOGY AND HYPERTENSION  [address]  [address]  [place] [address]  [phone]    MD CK BECERRA MD ANDREY GONCHARUK, MD MADHU KORRAPATI, MD YELENA ROSENBERG, MD BINNY KOSHY, MD CHRISTOPHER CAPUTO, MD EDWARD BOVER, MD      Information from chart:  "Patient is a 80y old  Female who presents with a chief complaint of   HPI:  81 y/o f with PMHx of HTN, DM Colon Ca Stage IV s/p Ex Lap BR with colostomy presents to the ED with Abd pain x >1 wk. Pt has been having abdominal pain for a long time but became increasingly severe 2 days ago , described as sharp and debilitating . Patient comes from home, has known Stage 4 colon cancer with colostomy status (bag with watery brown stool).  Pt daughter says pt has has not been eating for days  Denies nausea, vomiting , SOB , chest pain lightheadedness or any other complaints at this time . Of note pt is on xarelto for thromboses around colostomy site   (16 Oct 2020 07:17)   "    Noted Cr 3.4;    0Cr 1.7   Increasing liquid watery stool for prior to admission    PAST MEDICAL & SURGICAL HISTORY:  Colostomy present    H/O colon cancer, stage IV    DM (diabetes mellitus)    HTN (hypertension)    History of bowel resection    S/P exploratory laparotomy      FAMILY HISTORY:    Allergies    No Known Allergies    Intolerances      Home Medications:    MEDICATIONS  (STANDING):  chlorhexidine 4% Liquid 1 Application(s) Topical <User Schedule>  dextrose 5% + sodium chloride 0.45%. 1000 milliLiter(s) (90 mL/Hr) IV Continuous <Continuous>  dextrose 5%. 1000 milliLiter(s) (50 mL/Hr) IV Continuous <Continuous>  dextrose 50% Injectable 12.5 Gram(s) IV Push once  dextrose 50% Injectable 25 Gram(s) IV Push once  dextrose 50% Injectable 25 Gram(s) IV Push once  heparin   Injectable 5000 Unit(s) SubCutaneous every 8 hours  influenza   Vaccine 0.5 milliLiter(s) IntraMuscular once  insulin lispro (HumaLOG) corrective regimen sliding scale   SubCutaneous every 6 hours  norepinephrine Infusion 0.05 MICROgram(s)/kG/Min (4.89 mL/Hr) IV Continuous <Continuous>  pantoprazole  Injectable 40 milliGRAM(s) IV Push daily  piperacillin/tazobactam IVPB. 3.375 Gram(s) IV Intermittent once  piperacillin/tazobactam IVPB.. 3.375 Gram(s) IV Intermittent every 12 hours    MEDICATIONS  (PRN):  dextrose 40% Gel 15 Gram(s) Oral once PRN Blood Glucose LESS THAN 70 milliGRAM(s)/deciliter  glucagon  Injectable 1 milliGRAM(s) IntraMuscular once PRN Glucose LESS THAN 70 milligrams/deciliter  ondansetron Injectable 4 milliGRAM(s) IV Push every 6 hours PRN Nausea and/or Vomiting    Vital Signs Last 24 Hrs  T(C): 36.4 (16 Oct 2020 09:30), Max: 37.9 (16 Oct 2020 02:27)  T(F): 97.6 (16 Oct 2020 09:30), Max: 100.3 (16 Oct 2020 02:27)  HR: 94 (16 Oct 2020 10:00) (84 - 98)  BP: 100/54 (16 Oct 2020 10:00) (64/34 - 165/140)  BP(mean): 65 (16 Oct 2020 10:00) (53 - 147)  RR: 24 (16 Oct 2020 10:00) (13 - 27)  SpO2: 95% (16 Oct 2020 10:00) (91% - 100%)    Daily Height in cm: 165.1 (16 Oct 2020 01:58)    Daily     10-16-20 @ 07:01  -  10-16-20 @ 11:03  --------------------------------------------------------  IN: 1039.2 mL / OUT: 800 mL / NET: 239.2 mL      CAPILLARY BLOOD GLUCOSE      POCT Blood Glucose.: 150 mg/dL (16 Oct 2020 01:57)    PHYSICAL EXAM:      T(C): 36.4 (10-16-20 @ 09:30), Max: 37.9 (10-16-20 @ 02:27)  HR: 94 (10-16-20 @ 10:00) (84 - 98)  BP: 100/54 (10-16-20 @ 10:00) (64/34 - 165/140)  RR: 24 (10-16-20 @ 10:00) (13 - 27)  SpO2: 95% (10-16-20 @ 10:00) (91% - 100%)  Wt(kg): --  Noted  Lungs clear  Heart S1S2  Abdomen: tense abdomen, diffusely tender to palpation, nondistended. ostomy with brown watery output. NGT   Ext tr edema    10-16    124<L>  |  86<L>  |  106<H>  ----------------------------<  139<H>  4.4   |  21<L>  |  3.47<H>    Ca    7.5<L>      16 Oct 2020 02:30    TPro  5.6<L>  /  Alb  1.9<L>  /  TBili  0.5  /  DBili  x   /  AST  175<H>  /  ALT  87<H>  /  AlkPhos  76  10-16                          13.0   10.87 )-----------( 460      ( 16 Oct 2020 02:30 )             37.6     Creatinine Trend: 3.47<--  Urinalysis Basic - ( 16 Oct 2020 05:06 )    Color: Yellow / Appearance: Clear / S.010 / pH: x  Gluc: x / Ketone: Negative  / Bili: Negative / Urobili: Negative mg/dL   Blood: x / Protein: 30 mg/dL / Nitrite: Negative   Leuk Esterase: Negative / RBC: 0-2 /HPF / WBC 0-2   Sq Epi: x / Non Sq Epi: Occasional / Bacteria: Few                Assessment   FELICITAS in the face of SBO; poor po intake and liquid ostomy output;   Hypovolemic hyponatremia   Pre renal azotemia, risk for ischemic ATN;   Risk for contrast injury     Plan  Re check labs stat  IVF resuscitation with isotonic solution  Follow UO trend  No absolute renal contraindication to surgery if life saving; follow labs q4-6 hrs      Artie Corcoran MD

## 2020-10-16 NOTE — ED PROVIDER NOTE - CARE PLAN
Principal Discharge DX:	Septic shock  Secondary Diagnosis:	Small bowel obstruction  Secondary Diagnosis:	Iliac artery thrombosis, right

## 2020-10-16 NOTE — PROCEDURE NOTE - NSICDXPROCEDURE_GEN_ALL_CORE_FT
PROCEDURES:  Insertion of nontunneled central venous catheter (CVC) in patient age 5 years of age or older 16-Oct-2020 16:08:14  Michael Dasilva  Insertion of central venous catheter with ultrasound guidance 16-Oct-2020 16:03:34  Michael Dasilva

## 2020-10-16 NOTE — CONSULT NOTE ADULT - PROBLEM SELECTOR RECOMMENDATION 2
malignant bowel obstruction protocol   octreotide, NGT decompression, decadron 4mg BID can do QID if OK w surgery  surgery following with intent to bring to OR as CT describes transition point  pt with electrolyte derangements acidemia and FELICITAS- ICU trying to optimize so that pt may go to OR as soon as possible  in the interim if pt develops nausea and or vomiting can use Haldol 0.5mg - 2mg IV q 6 PRN

## 2020-10-16 NOTE — CONSULT NOTE ADULT - PROBLEM SELECTOR RECOMMENDATION 9
low dose dilaudid 0.5mg IV q 4 PRN pain  pt had just recently been started on percocet in the community met with pt briefly and with daughter Mesha outside of the room with ICU physician Dr. Emery - review of pt's medical course prior to admission and current medical state discussed as well as treatment options. Pt and her daughter want the chance to go to OR for possible relief of obstruction and have been counseled on the risk of poor outcome including death, but they want that chance.  Pt will remain full code, will follow clinical course and continue discussions with pt and daughter as condition evolves.

## 2020-10-16 NOTE — H&P ADULT - ASSESSMENT
81 y/o f with PMHx of HTN, DM Colon Ca Stage IV s/p Ex Lap BR with colostomy presents to the ED with Abd pain x >1 wk. Pt has been having abdominal pain for a long time but became increasingly severe 2 days ago , described as sharp and debilitating . Patient comes from home, has known Stage 4 colon cancer with colostomy status (bag with watery brown stool).  Pt daughter says pt has has not been eating for days.   Patient presents to the ED hypotension, lethargic and weakness. Pt was started on pressors after 3L of IVF boluses with hypotension . CT scan shows SBO and High grade stenosis of common iliacs /near complete occlusion of the right external iliac artery .    Neuro   Alert but slightly confused     CV  On Levophed   Keep MAP >65    Resp  On RA   Keep O2 sat >94%    GI  Surgery consult -SBO in the setrting of Colon Cancer s/p Ex Lap BR and colostomy  NPO for Now   NGT to LCS  PUD PPx with Protonix  IVF on NPO   Zofran 4mg IVP      Insert Szymanski   strict Is and Os     Heme   f/up labs   ID  Endo   Others      81 y/o f with PMHx of HTN, DM Colon Ca Stage IV s/p Ex Lap BR with colostomy presents to the ED with Abd pain x >1 wk. Pt has been having abdominal pain for a long time but became increasingly severe 2 days ago , described as sharp and debilitating . Patient comes from home, has known Stage 4 colon cancer with colostomy status (bag with watery brown stool).  Pt daughter says pt has has not been eating for days.   Patient presents to the ED hypotension, lethargic and weakness. Pt was started on pressors after 3L of IVF boluses with hypotension . CT scan shows SBO and High grade stenosis of common iliacs /near complete occlusion of the right external iliac artery .    Neuro   Alert but slightly confused     CV  On Levophed   Keep MAP >65    Resp  On RA   Keep O2 sat >94%    GI  Surgery consult -SBO in the setting of Colon Cancer s/p Ex Lap BR and colostomy  NPO for Now   NGT to LCS  PUD PPx with Protonix  IVF on NPO   Zofran 4mg IVP      Insert Szymanski   strict Is and Os     Heme   f/up labs     ID  Watch for WBC, fevers    Endo   Diabetes Orders   POC Glucose q6hr   ISS    Others   DVT PPx ,  In ICU   Full Code

## 2020-10-16 NOTE — CONSULT NOTE ADULT - PROBLEM SELECTOR RECOMMENDATION 7
has collaterals   surgery following   not clear if pt would benefit from endovascular interventions but currently main issue is SBO

## 2020-10-16 NOTE — ED PROVIDER NOTE - OBJECTIVE STATEMENT
Pertinent PMH/PSH/FHx/SHx and Review of Systems contained within:  Patient presents to the ED for hypotension and weakness. Patient comes from home, has known Stage 4 colon cancer with colostomy status (bag with watery brown stool).  Per EMS patient has not been eating or drinking for the last few days.  No vomiting reported.  No bleeding at home per EMS.  Patient was not hypoxic in the field.  Patient became confused and lethargic tonight, she is very hypotensive.  Daughter called at contact in chart, no answer, message left. Pertinent PMH/PSH/FHx/SHx and Review of Systems contained within:  Patient presents to the ED for hypotension and weakness. Patient comes from home, has known Stage 4 colon cancer with colostomy status (bag with watery brown stool).  Per EMS patient has not been eating or drinking for the last few days.  No vomiting reported.  No bleeding at home per EMS.  Patient was not hypoxic in the field.  Patient became confused and lethargic tonight, she is very hypotensive.  Daughter called at contact in chart, no answer, message left.    Relevant PMHx/SHx/SOCHx/FAMH:  DM, Stage 4 colon cancer, on xarelto for thromboses around colostomy site  Patient family denies EtOH/tobacco/illicit substance use. Pertinent PMH/PSH/FHx/SHx and Review of Systems contained within:  Patient presents to the ED for hypotension and weakness. Patient comes from home, has known Stage 4 colon cancer with colostomy status (bag with watery brown stool).  Per EMS patient has not been eating or drinking for the last few days.  No vomiting reported.  No bleeding at home per EMS.  Patient was not hypoxic in the field.  Patient became confused and lethargic tonight, she is very hypotensive.  She softly mentions pain in her abdomen and back but unable to obtain further history as patient is very weak.  Daughter called at contact in chart, no answer, message left.    Relevant PMHx/SHx/SOCHx/FAMH:  DM, Stage 4 colon cancer, on xarelto for thromboses around colostomy site  Patient family denies EtOH/tobacco/illicit substance use.

## 2020-10-16 NOTE — CONSULT NOTE ADULT - SUBJECTIVE AND OBJECTIVE BOX
CARDIOLOGY CONSULTATION NOTE                                                                               IVAN WHALEN is a 80y Female with PMHx of HTN, DM Colon Ca Stage IV s/p Ex Lap BR with colostomy presents to the ED with Abd pain x >1 wk. Pt has been having abdominal pain for a long time which became more severe 2 days ago , described as sharp and debilitating. Patient comes from home, has known Stage 4 colon cancer with colostomy status (bag with watery brown stool).  Pt daughter says pt has has not been eating for days  Denies nausea, vomiting , SOB , chest pain lightheadedness or any other complaints at this time . Of note pt is on xarelto for thromboses around colostomy site.  No prior CADS by history. Suspecting bowel obstruction.   (16 Oct 2020 07:17)      REVIEW OF SYSTEMS: NA  --------------------  Home Medications:  metFORMIN 500 mg oral tablet: 1 tab(s) orally 2 times a day (16 Oct 2020 13:49)  nateglinide 120 mg oral tablet: 1 tab(s) orally 3 times a day (before meals) (16 Oct 2020 13:49)  quinapril-hydrochlorothiazide 10mg-12.5mg oral tablet: 1 tab(s) orally once a day (16 Oct 2020 13:49)  Vytorin 10 mg-20 mg oral tablet: 1 tab(s) orally once a day (16 Oct 2020 13:49)  Xarelto 20 mg oral tablet: 1 tab(s) orally once a day (in the evening) (16 Oct 2020 13:49)      MEDICATIONS  (STANDING):  chlorhexidine 4% Liquid 1 Application(s) Topical <User Schedule>  dexAMETHasone  Injectable 4 milliGRAM(s) IV Push every 12 hours  dextrose 5% + sodium chloride 0.9%. 1000 milliLiter(s) (100 mL/Hr) IV Continuous <Continuous>  dextrose 5%. 1000 milliLiter(s) (50 mL/Hr) IV Continuous <Continuous>  dextrose 50% Injectable 12.5 Gram(s) IV Push once  dextrose 50% Injectable 25 Gram(s) IV Push once  dextrose 50% Injectable 25 Gram(s) IV Push once  heparin   Injectable 5000 Unit(s) SubCutaneous every 8 hours  influenza   Vaccine 0.5 milliLiter(s) IntraMuscular once  insulin lispro (HumaLOG) corrective regimen sliding scale   SubCutaneous every 6 hours  norepinephrine Infusion 0.05 MICROgram(s)/kG/Min (4.89 mL/Hr) IV Continuous <Continuous>  octreotide  Injectable 100 MICROGram(s) SubCutaneous three times a day  pantoprazole  Injectable 40 milliGRAM(s) IV Push daily  piperacillin/tazobactam IVPB.. 3.375 Gram(s) IV Intermittent every 12 hours      ALLERGIES: No Known Allergies      FAMILY HISTORY: NA      PHYSICAL EXAMINATION:  -----------------------------  T(C): 36.4 (10-16-20 @ 09:30), Max: 37.9 (10-16-20 @ 02:27)  HR: 95 (10-16-20 @ 14:29) (84 - 100)  BP: 101/57 (10-16-20 @ 13:45) (64/34 - 165/140)  RR: 24 (10-16-20 @ 14:29) (13 - 27)  SpO2: 97% (10-16-20 @ 14:29) (91% - 100%)  Wt(kg): --    10-16 @ 07:01  -  10-16 @ 14:43  --------------------------------------------------------  IN:    Norepinephrine: 39.2 mL    Sodium Chloride 0.9% Bolus: 1000 mL  Total IN: 1039.2 mL    OUT:    Indwelling Catheter - Urethral (mL): 300 mL    Nasogastric/Oral tube (mL): 500 mL  Total OUT: 800 mL    Total NET: 239.2 mL        Height (cm): 165.1 (10-16 @ 01:58)  Weight (kg): 62.5 (10-16 @ 13:00)  BMI (kg/m2): 22.9 (10-16 @ 13:00)  BSA (m2): 1.69 (10-16 @ 13:00)    Constitutional: no resp distress,  well nourished, no deformities.   Eyes: the conjunctiva exhibited no abnormalities and the eyelids demonstrated no xanthelasmas.   HEENT: normal oral mucosa, no oral pallor and no oral cyanosis.   Neck: normal jugular venous A waves present, normal jugular venous V waves present and no jugular venous valadez A waves.   Pulmonary: no respiratory distress, normal respiratory rhythm and effort, no accessory muscle use and lungs were clear to auscultation bilaterally.   Cardiovascular: heart rate and rhythm were normal, normal S1 and S2 and no murmur, gallop, rub, heave or thrill are present.   Abdomen: tense, non tender,mildly distended.   Musculoskeletal: the gait could not be assessed..   Extremities: ncool extremities bilaterally, non obtainable pulses on doppler.   Skin: normal skin color and pigmentation, no rash, no venous stasis, no skin lesions, no skin ulcer and no xanthoma was observed.   Psychiatric: oriented to person, place, and time, the affect was normal, the mood was normal and not feeling anxious.     ECG:  -------  NSR/APC's, non specific ST T wave changes in inferolateral leads, unchanged since this AM      LABS:   --------  10-16    130<L>  |  99  |  86<H>  ----------------------------<  292<H>  3.1<L>   |  18<L>  |  2.38<H>    Ca    6.3<LL>      16 Oct 2020 10:55  Phos  2.9     10-16  Mg     1.6     10-16    TPro  5.6<L>  /  Alb  1.9<L>  /  TBili  0.5  /  DBili  x   /  AST  175<H>  /  ALT  87<H>  /  AlkPhos  76  10-16                         9.8    8.06  )-----------( 346      ( 16 Oct 2020 10:55 )             27.6     PT/INR - ( 16 Oct 2020 02:30 )   PT: 13.9 sec;   INR: 1.21 ratio         PTT - ( 16 Oct 2020 02:30 )  PTT:32.0 sec    10-16 @ 12:17 CPK total:--, CKMB --, Troponin I - .217 ng/mL<H>  10-16 @ 02:30 CPK total:--, CKMB --, Troponin I - .217 ng/mL<H>      RADIOLOGY REPORTS:  -----------------------------  ra< from: Xray Chest 1 View- PORTABLE-Urgent (Xray Chest 1 View- PORTABLE-Urgent .) (10.16.20 @ 11:22) >    EXAM:  XR CHEST PORTABLE URGENT 1V                            PROCEDURE DATE:  10/16/2020          INTERPRETATION:  CLINICAL STATEMENT: Follow-up chest pain.    TECHNIQUE: AP view of the chest.    COMPARISON: 10/16/2020 at 8:36 AM    FINDINGS/  IMPRESSION:  Feeding tube again noted. New left central line noted tip overlying the region of SVC. No pneumothorax.    No new consolidation or pleural effusion.    Heart size within normal limits.          nick BAJWA M.D. ATTENDING RADIOLOGIST  This document has been electronically signed. Oct 16 2020 11:23AM    < end of copied text >        ECHOCARDIOGRAM:  ---------------------------  POCUS - grossly normal LV/RV function. No critical AS.

## 2020-10-16 NOTE — ED ADULT NURSE REASSESSMENT NOTE - NS ED NURSE REASSESS COMMENT FT1
Dr. tam made aware of all prior events. pt on levophed infusion and lactated ringer bolus, no acute distress noted. daughter at bedside

## 2020-10-16 NOTE — ED PROVIDER NOTE - PHYSICAL EXAMINATION
Gen: Awake, lethargic, cachectic  Head: NC, AT, EOMI, normal lids/conjunctiva  ENT: dry mucosa  Neck: +supple, no JVD, +Trachea midline  Pulm: Bilateral BS, normal resp effort, no wheeze/stridor/retractions  CV: RRR, no M/R/G, +dist pulses  Abd: soft, ND, +colostomy with watery brown stool  Mskel: no edema/erythema/cyanosis  Skin: no rash, warm/dry  Neuro: AAOx3, no apparent sensory/motor deficits, coordination intact Gen: Awake, lethargic, cachectic  Head: NC, AT, EOMI, normal lids/conjunctiva  ENT: dry mucosa  Neck: +supple, no JVD, +Trachea midline  Pulm: Bilateral BS, normal resp effort, no wheeze/stridor/retractions  CV: RRR, no M/R/G, +dist pulses  Abd: soft, ND, +ttp, +colostomy with watery brown nonbloody stool  Mskel: no edema/erythema/cyanosis  Skin: no rash, warm/dry  Neuro: AAOx3, no apparent sensory/motor deficits, coordination intact

## 2020-10-17 LAB
A1C WITH ESTIMATED AVERAGE GLUCOSE RESULT: 7 % — HIGH (ref 4–5.6)
ALBUMIN SERPL ELPH-MCNC: 1.1 G/DL — LOW (ref 3.3–5)
ALP SERPL-CCNC: 64 U/L — SIGNIFICANT CHANGE UP (ref 40–120)
ALT FLD-CCNC: 128 U/L — HIGH (ref 12–78)
ANION GAP SERPL CALC-SCNC: 14 MMOL/L — SIGNIFICANT CHANGE UP (ref 5–17)
AST SERPL-CCNC: 188 U/L — HIGH (ref 15–37)
BILIRUB SERPL-MCNC: 0.3 MG/DL — SIGNIFICANT CHANGE UP (ref 0.2–1.2)
BUN SERPL-MCNC: 67 MG/DL — HIGH (ref 7–23)
CALCIUM SERPL-MCNC: 6.3 MG/DL — CRITICAL LOW (ref 8.5–10.1)
CHLORIDE SERPL-SCNC: 109 MMOL/L — HIGH (ref 96–108)
CO2 SERPL-SCNC: 12 MMOL/L — LOW (ref 22–31)
CREAT SERPL-MCNC: 1.93 MG/DL — HIGH (ref 0.5–1.3)
CULTURE RESULTS: NO GROWTH — SIGNIFICANT CHANGE UP
ESTIMATED AVERAGE GLUCOSE: 154 MG/DL — HIGH (ref 68–114)
GLUCOSE BLDC GLUCOMTR-MCNC: 164 MG/DL — HIGH (ref 70–99)
GLUCOSE BLDC GLUCOMTR-MCNC: 171 MG/DL — HIGH (ref 70–99)
GLUCOSE BLDC GLUCOMTR-MCNC: 247 MG/DL — HIGH (ref 70–99)
GLUCOSE SERPL-MCNC: 157 MG/DL — HIGH (ref 70–99)
HCT VFR BLD CALC: 30.2 % — LOW (ref 34.5–45)
HGB BLD-MCNC: 10.6 G/DL — LOW (ref 11.5–15.5)
MAGNESIUM SERPL-MCNC: 2 MG/DL — SIGNIFICANT CHANGE UP (ref 1.6–2.6)
MCHC RBC-ENTMCNC: 31.3 PG — SIGNIFICANT CHANGE UP (ref 27–34)
MCHC RBC-ENTMCNC: 35.1 GM/DL — SIGNIFICANT CHANGE UP (ref 32–36)
MCV RBC AUTO: 89.1 FL — SIGNIFICANT CHANGE UP (ref 80–100)
NRBC # BLD: 0 /100 WBCS — SIGNIFICANT CHANGE UP (ref 0–0)
PHOSPHATE SERPL-MCNC: 3.2 MG/DL — SIGNIFICANT CHANGE UP (ref 2.5–4.5)
PLATELET # BLD AUTO: 284 K/UL — SIGNIFICANT CHANGE UP (ref 150–400)
POTASSIUM SERPL-MCNC: 3.6 MMOL/L — SIGNIFICANT CHANGE UP (ref 3.5–5.3)
POTASSIUM SERPL-SCNC: 3.6 MMOL/L — SIGNIFICANT CHANGE UP (ref 3.5–5.3)
PROT SERPL-MCNC: 3.8 GM/DL — LOW (ref 6–8.3)
RBC # BLD: 3.39 M/UL — LOW (ref 3.8–5.2)
RBC # FLD: 15.9 % — HIGH (ref 10.3–14.5)
SARS-COV-2 IGG SERPL QL IA: POSITIVE
SARS-COV-2 IGM SERPL IA-ACNC: 3.77 INDEX — HIGH
SODIUM SERPL-SCNC: 135 MMOL/L — SIGNIFICANT CHANGE UP (ref 135–145)
SPECIMEN SOURCE: SIGNIFICANT CHANGE UP
WBC # BLD: 12.13 K/UL — HIGH (ref 3.8–10.5)
WBC # FLD AUTO: 12.13 K/UL — HIGH (ref 3.8–10.5)

## 2020-10-17 PROCEDURE — 99233 SBSQ HOSP IP/OBS HIGH 50: CPT

## 2020-10-17 PROCEDURE — 99291 CRITICAL CARE FIRST HOUR: CPT

## 2020-10-17 RX ORDER — POTASSIUM CHLORIDE 20 MEQ
40 PACKET (EA) ORAL ONCE
Refills: 0 | Status: COMPLETED | OUTPATIENT
Start: 2020-10-17 | End: 2020-10-17

## 2020-10-17 RX ORDER — SODIUM CHLORIDE 9 MG/ML
1000 INJECTION, SOLUTION INTRAVENOUS
Refills: 0 | Status: DISCONTINUED | OUTPATIENT
Start: 2020-10-17 | End: 2020-10-19

## 2020-10-17 RX ORDER — QUINAPRIL HYDROCHLORIDE 40 MG/1
1 TABLET, FILM COATED ORAL
Qty: 0 | Refills: 0 | DISCHARGE

## 2020-10-17 RX ADMIN — Medication 2: at 12:21

## 2020-10-17 RX ADMIN — HYDROMORPHONE HYDROCHLORIDE 0.5 MILLIGRAM(S): 2 INJECTION INTRAMUSCULAR; INTRAVENOUS; SUBCUTANEOUS at 16:45

## 2020-10-17 RX ADMIN — OCTREOTIDE ACETATE 100 MICROGRAM(S): 200 INJECTION, SOLUTION INTRAVENOUS; SUBCUTANEOUS at 22:17

## 2020-10-17 RX ADMIN — HYDROMORPHONE HYDROCHLORIDE 0.5 MILLIGRAM(S): 2 INJECTION INTRAMUSCULAR; INTRAVENOUS; SUBCUTANEOUS at 08:28

## 2020-10-17 RX ADMIN — OCTREOTIDE ACETATE 100 MICROGRAM(S): 200 INJECTION, SOLUTION INTRAVENOUS; SUBCUTANEOUS at 15:47

## 2020-10-17 RX ADMIN — HEPARIN SODIUM 5000 UNIT(S): 5000 INJECTION INTRAVENOUS; SUBCUTANEOUS at 05:42

## 2020-10-17 RX ADMIN — Medication 4 MILLIGRAM(S): at 17:58

## 2020-10-17 RX ADMIN — SODIUM CHLORIDE 100 MILLILITER(S): 9 INJECTION, SOLUTION INTRAVENOUS at 08:31

## 2020-10-17 RX ADMIN — Medication 100 GRAM(S): at 00:50

## 2020-10-17 RX ADMIN — HEPARIN SODIUM 5000 UNIT(S): 5000 INJECTION INTRAVENOUS; SUBCUTANEOUS at 14:29

## 2020-10-17 RX ADMIN — Medication 2.93 MICROGRAM(S)/KG/MIN: at 04:43

## 2020-10-17 RX ADMIN — Medication 2.93 MICROGRAM(S)/KG/MIN: at 22:17

## 2020-10-17 RX ADMIN — Medication 2: at 05:51

## 2020-10-17 RX ADMIN — Medication 2.93 MICROGRAM(S)/KG/MIN: at 08:31

## 2020-10-17 RX ADMIN — CHLORHEXIDINE GLUCONATE 1 APPLICATION(S): 213 SOLUTION TOPICAL at 08:31

## 2020-10-17 RX ADMIN — HYDROMORPHONE HYDROCHLORIDE 0.5 MILLIGRAM(S): 2 INJECTION INTRAMUSCULAR; INTRAVENOUS; SUBCUTANEOUS at 08:43

## 2020-10-17 RX ADMIN — Medication 4: at 17:58

## 2020-10-17 RX ADMIN — OCTREOTIDE ACETATE 100 MICROGRAM(S): 200 INJECTION, SOLUTION INTRAVENOUS; SUBCUTANEOUS at 05:42

## 2020-10-17 RX ADMIN — HYDROMORPHONE HYDROCHLORIDE 0.5 MILLIGRAM(S): 2 INJECTION INTRAMUSCULAR; INTRAVENOUS; SUBCUTANEOUS at 16:30

## 2020-10-17 RX ADMIN — PIPERACILLIN AND TAZOBACTAM 25 GRAM(S): 4; .5 INJECTION, POWDER, LYOPHILIZED, FOR SOLUTION INTRAVENOUS at 22:17

## 2020-10-17 RX ADMIN — Medication 4 MILLIGRAM(S): at 05:42

## 2020-10-17 RX ADMIN — PIPERACILLIN AND TAZOBACTAM 25 GRAM(S): 4; .5 INJECTION, POWDER, LYOPHILIZED, FOR SOLUTION INTRAVENOUS at 11:29

## 2020-10-17 RX ADMIN — HEPARIN SODIUM 5000 UNIT(S): 5000 INJECTION INTRAVENOUS; SUBCUTANEOUS at 22:17

## 2020-10-17 RX ADMIN — Medication 40 MILLIEQUIVALENT(S): at 06:41

## 2020-10-17 RX ADMIN — PANTOPRAZOLE SODIUM 40 MILLIGRAM(S): 20 TABLET, DELAYED RELEASE ORAL at 12:20

## 2020-10-17 NOTE — PROGRESS NOTE ADULT - ASSESSMENT
79 yo female patient high grade SBO, mucinous adenocarcinoma, stage 4, undergoing chemotherapy in Spring 2020, status post Jose 2019. Noted several weeks of abdominal pain, anorexia, diarrhea via colostomy.     Evidence of peritoneal abdomen, on pressor support, tachycardia, with evidence of multisystem organ failure, mildly elevated troponin, FELICITAS, elevated lactate. +Iliac stenoses bilaterally.    Patient remains on pressor support being treated conservatively for possible bowel obstruction.  No evidence of cardiogenic shock present, hypotension likely in the setting of worsening sepsis.    Based on Revised Cardiac Risk Index patient would be at moderate to high risk for this surgical intervention (risk of MI, VT, CVA >7%), however, benefits may outweigh inherent risks. This needs to be assessed by surgical team.    Advised close hemodynamic monitoring, fluids as needed. Case discussed with anesthesia and ICU team at length. Holding NOAC , resume when cleared by surgery.     Will follow only as needed, please reconsult PRN.

## 2020-10-17 NOTE — PROGRESS NOTE ADULT - SUBJECTIVE AND OBJECTIVE BOX
CHIEF COMPLAINT:    Interval Events:    REVIEW OF SYSTEMS:  Constitutional: [ ] fevers [ ] chills [ ] weight loss [ ] weight gain  HEENT: [ ] dry eyes [ ] eye irritation [ ] postnasal drip [ ] nasal congestion  CV: [ ] chest pain [ ] orthopnea [ ] palpitations [ ] murmur  Resp: [ ] cough [ ] shortness of breath [ ] dyspnea [ ] wheezing [ ] sputum [ ] hemoptysis  GI: [ ] nausea [ ] vomiting [ ] diarrhea [ ] constipation [ ] abd pain [ ] dysphagia   : [ ] dysuria [ ] nocturia [ ] hematuria [ ] increased urinary frequency  Musculoskeletal: [ ] back pain [ ] myalgias [ ] arthralgias [ ] fracture  Skin: [ ] rash [ ] itch  Neurological: [ ] headache [ ] dizziness [ ] syncope [ ] weakness [ ] numbness  Hematologic/Lymphatic: [ ] anemia [ ] bleeding problem  Allergic/Immunologic: [ ] itchy eyes [ ] nasal discharge [ ] hives [ ] angioedema  [ ] All other systems negative  [ ] Unable to assess ROS because ________    OBJECTIVE:  ICU Vital Signs Last 24 Hrs  T(C): 37.8 (17 Oct 2020 06:00), Max: 37.8 (16 Oct 2020 23:00)  T(F): 100.1 (17 Oct 2020 06:00), Max: 100.1 (17 Oct 2020 06:00)  HR: 109 (17 Oct 2020 07:15) (89 - 114)  BP: 101/57 (16 Oct 2020 13:45) (88/52 - 132/58)  BP(mean): 68 (16 Oct 2020 13:45) (53 - 105)  ABP: 118/39 (17 Oct 2020 07:15) (86/50 - 151/49)  ABP(mean): 61 (17 Oct 2020 07:15) (56 - 92)  RR: 29 (17 Oct 2020 07:15) (19 - 37)  SpO2: 95% (17 Oct 2020 07:15) (94% - 100%)        10-16 @ 07:01  -  10-17 @ 07:00  --------------------------------------------------------  IN: 4656.5 mL / OUT: 2575 mL / NET: 2081.5 mL      CAPILLARY BLOOD GLUCOSE      POCT Blood Glucose.: 171 mg/dL (17 Oct 2020 05:45)      PHYSICAL EXAM:  General:   HEENT:   Neck:   Respiratory:   Cardiovascular:   Abdomen:   Extremities:   Skin:   Neurological:  Psychiatry:    LINES:    HOSPITAL MEDICATIONS:  MEDICATIONS  (STANDING):  chlorhexidine 4% Liquid 1 Application(s) Topical <User Schedule>  dexAMETHasone  Injectable 4 milliGRAM(s) IV Push every 12 hours  dextrose 5% + sodium chloride 0.9%. 1000 milliLiter(s) (100 mL/Hr) IV Continuous <Continuous>  dextrose 5%. 1000 milliLiter(s) (50 mL/Hr) IV Continuous <Continuous>  dextrose 50% Injectable 12.5 Gram(s) IV Push once  dextrose 50% Injectable 25 Gram(s) IV Push once  dextrose 50% Injectable 25 Gram(s) IV Push once  heparin   Injectable 5000 Unit(s) SubCutaneous every 8 hours  influenza   Vaccine 0.5 milliLiter(s) IntraMuscular once  insulin lispro (HumaLOG) corrective regimen sliding scale   SubCutaneous every 6 hours  norepinephrine Infusion 0.05 MICROgram(s)/kG/Min (2.93 mL/Hr) IV Continuous <Continuous>  octreotide  Injectable 100 MICROGram(s) SubCutaneous three times a day  pantoprazole  Injectable 40 milliGRAM(s) IV Push daily  piperacillin/tazobactam IVPB.. 3.375 Gram(s) IV Intermittent every 12 hours    MEDICATIONS  (PRN):  dextrose 40% Gel 15 Gram(s) Oral once PRN Blood Glucose LESS THAN 70 milliGRAM(s)/deciliter  glucagon  Injectable 1 milliGRAM(s) IntraMuscular once PRN Glucose LESS THAN 70 milligrams/deciliter  HYDROmorphone  Injectable 0.5 milliGRAM(s) IV Push every 4 hours PRN Severe Pain (7 - 10)  ondansetron Injectable 4 milliGRAM(s) IV Push every 6 hours PRN Nausea and/or Vomiting  sodium chloride 0.9% lock flush 10 milliLiter(s) IV Push every 1 hour PRN Pre/post blood products, medications, blood draw, and to maintain line patency      LABS:                        10.6   12.13 )-----------( 284      ( 17 Oct 2020 04:10 )             30.2     Hgb Trend: 10.6<--, 9.8<--, 13.0<--  10-17    135  |  109<H>  |  67<H>  ----------------------------<  157<H>  3.6   |  12<L>  |  1.93<H>    Ca    6.3<LL>      17 Oct 2020 04:10  Phos  3.2     10-17  Mg     2.0     10-17    TPro  3.8<L>  /  Alb  1.1<L>  /  TBili  0.3  /  DBili  x   /  AST  188<H>  /  ALT  128<H>  /  AlkPhos  64  10-17    PT/INR - ( 16 Oct 2020 02:30 )   PT: 13.9 sec;   INR: 1.21 ratio         PTT - ( 16 Oct 2020 02:30 )  PTT:32.0 sec  Urinalysis Basic - ( 16 Oct 2020 05:06 )    Color: Yellow / Appearance: Clear / S.010 / pH: x  Gluc: x / Ketone: Negative  / Bili: Negative / Urobili: Negative mg/dL   Blood: x / Protein: 30 mg/dL / Nitrite: Negative   Leuk Esterase: Negative / RBC: 0-2 /HPF / WBC 0-2   Sq Epi: x / Non Sq Epi: Occasional / Bacteria: Few            MICROBIOLOGY:     RADIOLOGY:  [ ] Reviewed and interpreted by me CHIEF COMPLAINT:    Interval Events:  Tax 100.1  decided that pt will not go to OR based on discussion between family and anesthesia    REVIEW OF SYSTEMS:  Constitutional: [ -] fevers [- ] chills [ ] weight loss [ ] weight gain  HEENT: [ ] dry eyes [ ] eye irritation [ ] postnasal drip [ ] nasal congestion  CV: [- ] chest pain [ ] orthopnea [- ] palpitations [ ] murmur  Resp: [- ] cough [- ] shortness of breath [- ] dyspnea [- ] wheezing [ ] sputum [ ] hemoptysis  GI: [- ] nausea [- ] vomiting [- ] diarrhea [ ] constipation [ +] abd pain [ ] dysphagia   : [ ] dysuria [ ] nocturia [ ] hematuria [ ] increased urinary frequency  Musculoskeletal: [- ] back pain [ +] myalgias [ ] arthralgias [ ] fracture  Skin: [ ] rash [ ] itch  Neurological: [- ] headache [ ] dizziness [ ] syncope [ ] weakness [ ] numbness  Hematologic/Lymphatic: [+ ] anemia [ ] bleeding problem  Allergic/Immunologic: [ ] itchy eyes [ ] nasal discharge [ ] hives [ ] angioedema  [ x] All other systems negative    OBJECTIVE:  ICU Vital Signs Last 24 Hrs  T(C): 37.8 (17 Oct 2020 06:00), Max: 37.8 (16 Oct 2020 23:00)  T(F): 100.1 (17 Oct 2020 06:00), Max: 100.1 (17 Oct 2020 06:00)  HR: 109 (17 Oct 2020 07:15) (89 - 114)  BP: 101/57 (16 Oct 2020 13:45) (88/52 - 132/58)  BP(mean): 68 (16 Oct 2020 13:45) (53 - 105)  ABP: 118/39 (17 Oct 2020 07:15) (86/50 - 151/49)  ABP(mean): 61 (17 Oct 2020 07:15) (56 - 92)  RR: 29 (17 Oct 2020 07:15) (19 - 37)  SpO2: 95% (17 Oct 2020 07:15) (94% - 100%)        10-16 @ 07:01  -  10-17 @ 07:00  --------------------------------------------------------  IN: 4656.5 mL / OUT: 2575 mL / NET: 2081.5 mL      CAPILLARY BLOOD GLUCOSE      POCT Blood Glucose.: 171 mg/dL (17 Oct 2020 05:45)      PHYSICAL EXAM:  General: mild distress, non-toxic appearing  HEENT: dry MM, EOMI  Neck: supple  Respiratory: CTA b/l  Cardiovascular: s1s2 RRR  Abdomen: TTP diffusely, some guarding, unable to hear BS  Extremities: cool RLE, warm LLE; no edema  Skin: mottled RLE  Neurological: no focal deficits  Psychiatry: AAOx3    LINES:    HOSPITAL MEDICATIONS:  MEDICATIONS  (STANDING):  chlorhexidine 4% Liquid 1 Application(s) Topical <User Schedule>  dexAMETHasone  Injectable 4 milliGRAM(s) IV Push every 12 hours  dextrose 5% + sodium chloride 0.9%. 1000 milliLiter(s) (100 mL/Hr) IV Continuous <Continuous>  dextrose 5%. 1000 milliLiter(s) (50 mL/Hr) IV Continuous <Continuous>  dextrose 50% Injectable 12.5 Gram(s) IV Push once  dextrose 50% Injectable 25 Gram(s) IV Push once  dextrose 50% Injectable 25 Gram(s) IV Push once  heparin   Injectable 5000 Unit(s) SubCutaneous every 8 hours  influenza   Vaccine 0.5 milliLiter(s) IntraMuscular once  insulin lispro (HumaLOG) corrective regimen sliding scale   SubCutaneous every 6 hours  norepinephrine Infusion 0.05 MICROgram(s)/kG/Min (2.93 mL/Hr) IV Continuous <Continuous>  octreotide  Injectable 100 MICROGram(s) SubCutaneous three times a day  pantoprazole  Injectable 40 milliGRAM(s) IV Push daily  piperacillin/tazobactam IVPB.. 3.375 Gram(s) IV Intermittent every 12 hours    MEDICATIONS  (PRN):  dextrose 40% Gel 15 Gram(s) Oral once PRN Blood Glucose LESS THAN 70 milliGRAM(s)/deciliter  glucagon  Injectable 1 milliGRAM(s) IntraMuscular once PRN Glucose LESS THAN 70 milligrams/deciliter  HYDROmorphone  Injectable 0.5 milliGRAM(s) IV Push every 4 hours PRN Severe Pain (7 - 10)  ondansetron Injectable 4 milliGRAM(s) IV Push every 6 hours PRN Nausea and/or Vomiting  sodium chloride 0.9% lock flush 10 milliLiter(s) IV Push every 1 hour PRN Pre/post blood products, medications, blood draw, and to maintain line patency      LABS:                        10.6   12.13 )-----------( 284      ( 17 Oct 2020 04:10 )             30.2     Hgb Trend: 10.6<--, 9.8<--, 13.0<--  10-17    135  |  109<H>  |  67<H>  ----------------------------<  157<H>  3.6   |  12<L>  |  1.93<H>    Ca    6.3<LL>      17 Oct 2020 04:10  Phos  3.2     10-17  Mg     2.0     10-17    TPro  3.8<L>  /  Alb  1.1<L>  /  TBili  0.3  /  DBili  x   /  AST  188<H>  /  ALT  128<H>  /  AlkPhos  64  10-17    PT/INR - ( 16 Oct 2020 02:30 )   PT: 13.9 sec;   INR: 1.21 ratio         PTT - ( 16 Oct 2020 02:30 )  PTT:32.0 sec  Urinalysis Basic - ( 16 Oct 2020 05:06 )    Color: Yellow / Appearance: Clear / S.010 / pH: x  Gluc: x / Ketone: Negative  / Bili: Negative / Urobili: Negative mg/dL   Blood: x / Protein: 30 mg/dL / Nitrite: Negative   Leuk Esterase: Negative / RBC: 0-2 /HPF / WBC 0-2   Sq Epi: x / Non Sq Epi: Occasional / Bacteria: Few            MICROBIOLOGY:   Culture - Blood (10.16.20 @ 08:26)    Specimen Source: .Blood Blood-Peripheral    Culture Results:   No growth to date.      RADIOLOGY:  [x ] Reviewed and interpreted by me  < from: CT Angio Abdomen and Pelvis w/ IV Cont (10.16.20 @ 04:50) >  High-grade small bowel obstruction with transition point in the lower abdomen, just left of midline.    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      < end of copied text >

## 2020-10-17 NOTE — PROGRESS NOTE ADULT - SUBJECTIVE AND OBJECTIVE BOX
CARDIOLOGY PROGRESS NOTE                                                                             IVAN WHALEN is a 80y Female with PMHx of HTN, DM Colon Ca Stage IV s/p Ex Lap BR with colostomy presents to the ED with Abd pain x >1 wk. Pt has been having abdominal pain for a long time which became more severe 2 days ago , described as sharp and debilitating. Patient comes from home, has known Stage 4 colon cancer with colostomy status (bag with watery brown stool).  Pt daughter says pt has has not been eating for days  Denies nausea, vomiting , SOB , chest pain lightheadedness or any other complaints at this time . Of note pt is on xarelto for thromboses around colostomy site. No prior CADS by history. Suspecting bowel obstruction.  (16 Oct 2020 07:17)   REVIEW OF SYSTEMS: NA -------------------- MEDICATIONS  (STANDING): chlorhexidine 4% Liquid 1 Application(s) Topical <User Schedule> dexAMETHasone  Injectable 4 milliGRAM(s) IV Push every 12 hours dextrose 5% + lactated ringers. 1000 milliLiter(s) (100 mL/Hr) IV Continuous <Continuous> dextrose 5%. 1000 milliLiter(s) (50 mL/Hr) IV Continuous <Continuous> dextrose 50% Injectable 12.5 Gram(s) IV Push once dextrose 50% Injectable 25 Gram(s) IV Push once dextrose 50% Injectable 25 Gram(s) IV Push once heparin   Injectable 5000 Unit(s) SubCutaneous every 8 hours influenza   Vaccine 0.5 milliLiter(s) IntraMuscular once insulin lispro (HumaLOG) corrective regimen sliding scale   SubCutaneous every 6 hours norepinephrine Infusion 0.05 MICROgram(s)/kG/Min (2.93 mL/Hr) IV Continuous <Continuous> octreotide  Injectable 100 MICROGram(s) SubCutaneous three times a day pantoprazole  Injectable 40 milliGRAM(s) IV Push daily piperacillin/tazobactam IVPB.. 3.375 Gram(s) IV Intermittent every 12 hours ALLERGIES: No Known Allergies   FAMILY HISTORY: NA   PHYSICAL EXAMINATION: ----------------------------- Vital Signs Last 24 Hrs T(C): 36.2 (17 Oct 2020 08:00), Max: 37.8 (16 Oct 2020 23:00) T(F): 97.1 (17 Oct 2020 08:00), Max: 100.1 (17 Oct 2020 06:00) HR: 107 (17 Oct 2020 11:00) (92 - 114) BP: 101/57 (16 Oct 2020 13:45) (101/57 - 114/55) BP(mean): 68 (16 Oct 2020 13:45) (68 - 68) RR: 22 (17 Oct 2020 11:00) (20 - 37) SpO2: 96% (17 Oct 2020 11:00) (94% - 100%)  Constitutional: no resp distress,  well nourished, no deformities.  Eyes: the conjunctiva exhibited no abnormalities and the eyelids demonstrated no xanthelasmas.  HEENT: normal oral mucosa, no oral pallor and no oral cyanosis.  Neck: normal jugular venous A waves present, normal jugular venous V waves present and no jugular venous valadez A waves.  Pulmonary: no respiratory distress, normal respiratory rhythm and effort, no accessory muscle use and lungs were clear to auscultation bilaterally.  Cardiovascular: heart rate and rhythm were normal, normal S1 and S2 and no murmur, gallop, rub, heave or thrill are present.  Abdomen: tense, mildly distended. No bowel sounds appreciated Musculoskeletal: the gait could not be assessed..  Extremities: cool extremities bilaterally, non obtainable pulses on doppler.  Skin: cyanotic mottled extremities.  Psychiatric: lethargic.   ECG: ------- NSR/APC's, non specific ST T wave changes in inferolateral leads, unchanged since this AM   LABS:  -------- 10-17  135  |  109<H>  |  67<H> ----------------------------<  157<H> 3.6   |  12<L>  |  1.93<H>  Ca    6.3<LL>      17 Oct 2020 04:10 Phos  3.2     10-17 Mg     2.0     10-17  TPro  3.8<L>  /  Alb  1.1<L>  /  TBili  0.3  /  DBili  x   /  AST  188<H>  /  ALT  128<H>  /  AlkPhos  64  10-17 10-16  130<L>  |  99  |  86<H> ----------------------------<  292<H> 3.1<L>   |  18<L>  |  2.38<H>  Ca    6.3<LL>      16 Oct 2020 10:55 Phos  2.9     10-16 Mg     1.6     10-16  TPro  5.6<L>  /  Alb  1.9<L>  /  TBili  0.5  /  DBili  x   /  AST  175<H>  /  ALT  87<H>  /  AlkPhos  76  10-16                        10.6  12.13 )-----------( 284      ( 17 Oct 2020 04:10 )            30.2                        9.8   8.06  )-----------( 346      ( 16 Oct 2020 10:55 )            27.6    PT/INR - ( 16 Oct 2020 02:30 )   PT: 13.9 sec;   INR: 1.21 ratio      PTT - ( 16 Oct 2020 02:30 )  PTT:32.0 sec  10-16 @ 12:17 CPK total:--, CKMB --, Troponin I - .217 ng/mL<H> 10-16 @ 02:30 CPK total:--, CKMB --, Troponin I - .217 ng/mL<H>   RADIOLOGY REPORTS: ----------------------------- ra< from: Xray Chest 1 View- PORTABLE-Urgent (Xray Chest 1 View- PORTABLE-Urgent .) (10.16.20 @ 11:22) >  EXAM:  XR CHEST PORTABLE URGENT 1V                         PROCEDURE DATE:  10/16/2020      INTERPRETATION:  CLINICAL STATEMENT: Follow-up chest pain.  TECHNIQUE: AP view of the chest.  COMPARISON: 10/16/2020 at 8:36 AM  FINDINGS/ IMPRESSION: Feeding tube again noted. New left central line noted tip overlying the region of SVC. No pneumothorax.  No new consolidation or pleural effusion.  Heart size within normal limits.     nick BAJWA M.D. ATTENDING RADIOLOGIST This document has been electronically signed. Oct 16 2020 11:23AM  < end of copied text >    ECHOCARDIOGRAM: --------------------------- < from: TTE Echo Complete w/o Contrast w/ Doppler (10.16.20 @ 14:50) >   EXAM:  ECHO TTE WO CON COMP W DOPP      PROCEDURE DATE:  10/16/2020     INTERPRETATION:  REPORT: TRANSTHORACIC ECHOCARDIOGRAM REPORT    Patient Name:   IVAN WHALEN Patient Location: [facility] Rec #:  [id number]        Accession #:      [id number] Account #:                        Height:           65.0 in 165.0 cm YOB: 1940         Weight:           114.6 lb 52.00 kg Patient Age:    80 years          BSA:              1.56 m² Patient Gender: F BP:               101/57 mmHg   Date of Exam:        10/16/2020 2:50:47 PM Sonographer:         MARGARET Referring Physician: ERI  Procedure:     2D Echo/Doppler/Color Doppler Complete. Indications:   Cardiogenic shock - R57.0 Diagnosis:No evidence of cardiogenic shock Study Details: Technically suboptimal study. Study quality was adversely                affected due to body habitus and COPD.    2D AND M-MODE MEASUREMENTS (normal ranges within parentheses): Left Ventricle:               Normal   Aorta/Left Atrium:          Normal IVSd (2D):              0.84 cm (0.7-1.1) Aortic Root (2D):  2.79 cm (2.4-3.7) LVPWd (2D):             0.86 cm (0.7-1.1) Left Atrium (2D):  2.55 cm (1.9-4.0) LVIDd (2D):             3.12 cm (3.4-5.7) LVIDs (2D):             2.02 cm LV FS (2D):             35.3 %   (>25%) Relative Wall Thickness  0.55    (<0.42)  LV DIASTOLIC FUNCTION: MV Peak E: 0.54 m/s Decel Time: 262 msec MV Peak A: 0.90 m/s E/A Ratio: 0.60  SPECTRAL DOPPLER ANALYSIS (where applicable): Mitral Valve: MV P1/2 Time: 76.11 msec MV Area, PHT: 2.89 cm²   PHYSICIAN INTERPRETATION: Left Ventricle: Normal left ventricular size and wall thicknesses, with normal systolic and diastolic function. Left ventricular ejection fraction, by visual estimation, is 60 to 65%. Right Ventricle: Normal right ventricular size and function. Left Atrium: The left atrium is normal in size. Right Atrium: The right atrium is normal in size. Pericardium: There is no evidence of pericardial effusion. Mitral Valve: Structurally normal mitral valve, with normal leaflet excursion. No evidence of mitral valve regurgitation is seen. Tricuspid Valve: Structurally normal tricuspid valve, with normal leaflet excursion. No tricuspid regurgitation is visualized. Aortic Valve: Normal trileaflet aortic valve with normal opening. No evidence of aortic valve regurgitation is seen. Pulmonic Valve: Structurally normal pulmonic valve, with normal leaflet excursion. No indication of pulmonic valve regurgitation. Aorta: The aortic root and ascending aorta are structurally normal, with no evidence of dilitation. Pulmonary Artery: The main pulmonary artery is normal in size.   Summary:  1. Left ventricular ejection fraction, by visual estimation, is 60 to 65%.  2. No evidence of mitral valve regurgitation.   [id number] Igor Beckman MD, [facility] Electronically signed on 10/16/2020 at 8:56:18 PM      *** Final ***         IGOR BECKMAN M.D.; Attending Cardiologist This document has been electronically signed. Oct 16 2020  2:50PM  < end of copied text >

## 2020-10-17 NOTE — PROGRESS NOTE ADULT - ASSESSMENT
10-17-20  the pt is seen and examined and events noted.  the feet are cool to touch, no gangrene, will follow as needed

## 2020-10-17 NOTE — PROVIDER CONTACT NOTE (CRITICAL VALUE NOTIFICATION) - ACTION/TREATMENT ORDERED:
calcium replacement
primary RN notified
provider notified and aware. results endorsed to primary RN.
orders pending
pending orders

## 2020-10-17 NOTE — PROGRESS NOTE ADULT - ASSESSMENT
80F w/ HTN, DM, Colon Ca s/p colostomy (4/2019) s/p chemo w/ stage 4 recurrence. 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 Admitted to ICU with septic shock, FELICITAS, demand ischemia and medical mgmt of SBO.     #Neuro - pain control w/ dilaudid PRN  #CV - remains in septic shock; elevated troponins likely due to demand ischemia from SBO; titrate NE to MAP>/65  #Pulm - tachypneic due to pain and elevated lactate, no active issues  #ID- currently on zosyn for intra-abdominal infection; blood cx NGTD, awaiting urine cx  #Renal/metabolic - FELICITAS likely prerenal ATN, improving overall; electrolytes acceptable, good UOP; continue w/ maintenance fluid D5LR  #GI- has high grade SBO, no plans for OR, continue w/ medical mgmt; NGT, NPO, protonix; on octreotide and steroids for malignant bowel obstruction  #Heme - hgb stable, HSQ; noted to have high grade stenosis in iliac arteries - no intervention at this time as per vascular surgery  #Endo - FS acceptable, ISS coverage  #PPx - HSQ for DVT ppx  #Dispo- remains in ICU in critical condition; will discuss w/ family today need for DNR/DNI as she is unlikely to survive hospital stay in setting of untreated high grade SBO     80F w/ HTN, DM, Colon Ca s/p colostomy (4/2019) s/p chemo w/ stage 4 recurrence. 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 Admitted to ICU with septic shock, FELICITAS, demand ischemia and medical mgmt of SBO.     #Neuro - pain control w/ dilaudid PRN  #CV - remains in septic shock; elevated troponins likely due to demand ischemia from SBO; titrate NE to MAP>/65  #Pulm - tachypneic due to pain and elevated lactate, no active issues  #ID- currently on zosyn for intra-abdominal infection; blood cx NGTD, awaiting urine cx  #Renal/metabolic - FELICITAS likely prerenal ATN, improving overall; electrolytes acceptable, good UOP; continue w/ maintenance fluid D5LR; bicarb dropping likely in setting of continued lactic acidosis  #GI- has high grade SBO, no plans for OR, continue w/ medical mgmt; NGT, NPO, protonix; on octreotide and steroids for malignant bowel obstruction  #Heme - hgb stable, HSQ; noted to have high grade stenosis in iliac arteries - no intervention at this time as per vascular surgery  #Endo - FS acceptable, ISS coverage  #PPx - HSQ for DVT ppx  #Dispo- remains in ICU in critical condition; will discuss w/ family today need for DNR/DNI as she is unlikely to survive hospital stay in setting of untreated high grade SBO

## 2020-10-17 NOTE — PROGRESS NOTE ADULT - SUBJECTIVE AND OBJECTIVE BOX
NEPHROLOGY PROGRESS NOTE    CHIEF COMPLAINT:  FELICITAS    HPI:  Renal function better.  NGT clamped.  No operative intervention planned.      EXAM:  T(F): 97.1 (10-17-20 @ 08:00)  HR: 107 (10-17-20 @ 11:00)  BP: 101/57 (10-16-20 @ 13:45)  RR: 22 (10-17-20 @ 11:00)  SpO2: 96% (10-17-20 @ 11:00)    Conversant, in no apparent distress  Normal respiratory effort, lungs clear bilaterally  Heart RRR with no murmur, no peripheral edema         LABS                             10.. )-----------( 284      ( 17 Oct 2020 04:10 )             30.2          10-17    135  |  109<H>  |  67<H>  ----------------------------<  157<H>  3.6   |  12<L>  |  1.93<H>    Ca    6.3<LL>      17 Oct 2020 04:10  Phos  3.2     10-17  Mg     2.0     10-17    TPro  3.8<L>  /  Alb  1.1<L>  /  TBili  0.3  /  DBili  x   /  AST  188<H>  /  ALT  128<H>  /  AlkPhos  64  10-17      Urinalysis Basic - ( 16 Oct 2020 05:06 )  Color: Yellow / Appearance: Clear / S.010 / pH: x  Gluc: x / Ketone: Negative  / Bili: Negative / Urobili: Negative mg/dL   Blood: x / Protein: 30 mg/dL / Nitrite: Negative   Leuk Esterase: Negative / RBC: 0-2 /HPF / WBC 0-2   Sq Epi: x / Non Sq Epi: Occasional / Bacteria: Few      Assessment   FELICITAS - prerenal azotemia - associated with high grade SBO - better with IV fluid  Hypovolemic hyponatremia, better  Metabolic acidosis, dilutional    Plan  Continue supportive care

## 2020-10-17 NOTE — PROGRESS NOTE ADULT - SUBJECTIVE AND OBJECTIVE BOX
SURGERY PROGRESS HPI:  Pt seen and examined at bedside resting comfortably in the CCU. Denies abdominal pain or complaints. Pt tolerating NGT. Pt denies nausea and vomiting. Pt denies chest pain, SOB, dizziness, fever, chills.     Vital Signs Last 24 Hrs  T(C): 37.8 (16 Oct 2020 23:00), Max: 37.8 (16 Oct 2020 23:00)  T(F): 100 (16 Oct 2020 23:00), Max: 100 (16 Oct 2020 23:00)  HR: 109 (17 Oct 2020 04:00) (88 - 114)  BP: 101/57 (16 Oct 2020 13:45) (88/52 - 165/140)  BP(mean): 68 (16 Oct 2020 13:45) (53 - 147)  RR: 32 (17 Oct 2020 04:00) (19 - 37)  SpO2: 97% (17 Oct 2020 04:00) (94% - 100%)      PHYSICAL EXAM:    GENERAL: NAD  HEENT: NGT in place with bilious output 650cc/24hrs  CHEST/LUNG: Clear to ausculation, bilaterally   HEART: RRR S1S2  ABDOMEN: Ostomy pink and viable with small amount of liquid stool in the bag 200cc/24hrs, no gas. Non distended, hypoactive BS, soft, non tender, no guarding  : Szymanski indwelling with clear yellow urine  EXTREMITIES:  calf soft, non tender b/l    I&O's Detail    16 Oct 2020 07:01  -  17 Oct 2020 06:02  --------------------------------------------------------  IN:    dextrose 5% + sodium chloride 0.45%: 180 mL    dextrose 5% + sodium chloride 0.9%: 1700 mL    IV PiggyBack: 400 mL    Norepinephrine: 753 mL    Norepinephrine: 219.2 mL    Sodium Chloride 0.9% Bolus: 1000 mL  Total IN: 4252.2 mL    OUT:    Colostomy (mL): 200 mL    Indwelling Catheter - Urethral (mL): 1365 mL    Nasogastric/Oral tube (mL): 650 mL  Total OUT: 2215 mL    Total NET: 2037.2 mL          LABS:                        10.6   12.13 )-----------( 284      ( 17 Oct 2020 04:10 )             30.2     10-17    135  |  109<H>  |  67<H>  ----------------------------<  157<H>  3.6   |  12<L>  |  1.93<H>    Ca    6.3<LL>      17 Oct 2020 04:10  Phos  3.2     10-17  Mg     2.0     10-17    TPro  3.8<L>  /  Alb  1.1<L>  /  TBili  0.3  /  DBili  x   /  AST  188<H>  /  ALT  128<H>  /  AlkPhos  64  10-17    PT/INR - ( 16 Oct 2020 02:30 )   PT: 13.9 sec;   INR: 1.21 ratio      PTT - ( 16 Oct 2020 02:30 )  PTT:32.0 sec    Urinalysis Basic - ( 16 Oct 2020 05:06 )  Color: Yellow / Appearance: Clear / S.010 / pH: x  Gluc: x / Ketone: Negative  / Bili: Negative / Urobili: Negative mg/dL   Blood: x / Protein: 30 mg/dL / Nitrite: Negative   Leuk Esterase: Negative / RBC: 0-2 /HPF / WBC 0-2   Sq Epi: x / Non Sq Epi: Occasional / Bacteria: Few        Assessment: 79 yo F with PMH/PSH of DM, Stage 4 colon cancer, exp lap with bowel resection and ostomy (2019, at [facility]), on Xarelto for thrombosis around ostomy arrives to ED with abdominal pain, with confusion and lethargy. A/w SBO and High grade stenosis of common iliacs. NGT placed in ED by surgical team with initial feculent output. On Levophed.    Plan:  -NPO, IV hydration, NGT to LWS. Conservative management for now.  -pain management prn  -continue DVT prophylaxis, OOB, Ambulating  -continue Zosyn   -f/u labs  -Per Dr. Hopper, if pt becomes stable angio and possible Angioplasty.   -will discuss pt with surgical attendings

## 2020-10-18 LAB
ANION GAP SERPL CALC-SCNC: 11 MMOL/L — SIGNIFICANT CHANGE UP (ref 5–17)
BUN SERPL-MCNC: 77 MG/DL — HIGH (ref 7–23)
CALCIUM SERPL-MCNC: 6.7 MG/DL — LOW (ref 8.5–10.1)
CHLORIDE SERPL-SCNC: 107 MMOL/L — SIGNIFICANT CHANGE UP (ref 96–108)
CO2 SERPL-SCNC: 17 MMOL/L — LOW (ref 22–31)
CREAT SERPL-MCNC: 2.69 MG/DL — HIGH (ref 0.5–1.3)
GLUCOSE BLDC GLUCOMTR-MCNC: 161 MG/DL — HIGH (ref 70–99)
GLUCOSE BLDC GLUCOMTR-MCNC: 191 MG/DL — HIGH (ref 70–99)
GLUCOSE BLDC GLUCOMTR-MCNC: 207 MG/DL — HIGH (ref 70–99)
GLUCOSE BLDC GLUCOMTR-MCNC: 223 MG/DL — HIGH (ref 70–99)
GLUCOSE SERPL-MCNC: 238 MG/DL — HIGH (ref 70–99)
HCT VFR BLD CALC: 28.2 % — LOW (ref 34.5–45)
HGB BLD-MCNC: 10 G/DL — LOW (ref 11.5–15.5)
MAGNESIUM SERPL-MCNC: 2 MG/DL — SIGNIFICANT CHANGE UP (ref 1.6–2.6)
MCHC RBC-ENTMCNC: 31.4 PG — SIGNIFICANT CHANGE UP (ref 27–34)
MCHC RBC-ENTMCNC: 35.5 GM/DL — SIGNIFICANT CHANGE UP (ref 32–36)
MCV RBC AUTO: 88.7 FL — SIGNIFICANT CHANGE UP (ref 80–100)
NRBC # BLD: 0 /100 WBCS — SIGNIFICANT CHANGE UP (ref 0–0)
PHOSPHATE SERPL-MCNC: 3.9 MG/DL — SIGNIFICANT CHANGE UP (ref 2.5–4.5)
PLATELET # BLD AUTO: 245 K/UL — SIGNIFICANT CHANGE UP (ref 150–400)
POTASSIUM SERPL-MCNC: 4.6 MMOL/L — SIGNIFICANT CHANGE UP (ref 3.5–5.3)
POTASSIUM SERPL-SCNC: 4.6 MMOL/L — SIGNIFICANT CHANGE UP (ref 3.5–5.3)
RBC # BLD: 3.18 M/UL — LOW (ref 3.8–5.2)
RBC # FLD: 16.4 % — HIGH (ref 10.3–14.5)
SODIUM SERPL-SCNC: 135 MMOL/L — SIGNIFICANT CHANGE UP (ref 135–145)
WBC # BLD: 14.97 K/UL — HIGH (ref 3.8–10.5)
WBC # FLD AUTO: 14.97 K/UL — HIGH (ref 3.8–10.5)

## 2020-10-18 PROCEDURE — 99291 CRITICAL CARE FIRST HOUR: CPT

## 2020-10-18 RX ORDER — FENTANYL CITRATE 50 UG/ML
1 INJECTION INTRAVENOUS
Refills: 0 | Status: DISCONTINUED | OUTPATIENT
Start: 2020-10-18 | End: 2020-10-20

## 2020-10-18 RX ORDER — HYDROMORPHONE HYDROCHLORIDE 2 MG/ML
1 INJECTION INTRAMUSCULAR; INTRAVENOUS; SUBCUTANEOUS EVERY 4 HOURS
Refills: 0 | Status: DISCONTINUED | OUTPATIENT
Start: 2020-10-18 | End: 2020-10-20

## 2020-10-18 RX ADMIN — HEPARIN SODIUM 5000 UNIT(S): 5000 INJECTION INTRAVENOUS; SUBCUTANEOUS at 22:45

## 2020-10-18 RX ADMIN — PIPERACILLIN AND TAZOBACTAM 25 GRAM(S): 4; .5 INJECTION, POWDER, LYOPHILIZED, FOR SOLUTION INTRAVENOUS at 22:46

## 2020-10-18 RX ADMIN — Medication 2: at 01:00

## 2020-10-18 RX ADMIN — HEPARIN SODIUM 5000 UNIT(S): 5000 INJECTION INTRAVENOUS; SUBCUTANEOUS at 05:58

## 2020-10-18 RX ADMIN — Medication 2: at 17:47

## 2020-10-18 RX ADMIN — OCTREOTIDE ACETATE 100 MICROGRAM(S): 200 INJECTION, SOLUTION INTRAVENOUS; SUBCUTANEOUS at 23:49

## 2020-10-18 RX ADMIN — CHLORHEXIDINE GLUCONATE 1 APPLICATION(S): 213 SOLUTION TOPICAL at 09:56

## 2020-10-18 RX ADMIN — Medication 4 MILLIGRAM(S): at 17:46

## 2020-10-18 RX ADMIN — Medication 4: at 11:42

## 2020-10-18 RX ADMIN — HYDROMORPHONE HYDROCHLORIDE 0.5 MILLIGRAM(S): 2 INJECTION INTRAMUSCULAR; INTRAVENOUS; SUBCUTANEOUS at 01:25

## 2020-10-18 RX ADMIN — FENTANYL CITRATE 1 PATCH: 50 INJECTION INTRAVENOUS at 09:58

## 2020-10-18 RX ADMIN — FENTANYL CITRATE 1 PATCH: 50 INJECTION INTRAVENOUS at 20:33

## 2020-10-18 RX ADMIN — Medication 2: at 23:52

## 2020-10-18 RX ADMIN — HEPARIN SODIUM 5000 UNIT(S): 5000 INJECTION INTRAVENOUS; SUBCUTANEOUS at 17:47

## 2020-10-18 RX ADMIN — Medication 2.93 MICROGRAM(S)/KG/MIN: at 05:59

## 2020-10-18 RX ADMIN — HYDROMORPHONE HYDROCHLORIDE 1 MILLIGRAM(S): 2 INJECTION INTRAMUSCULAR; INTRAVENOUS; SUBCUTANEOUS at 11:42

## 2020-10-18 RX ADMIN — PANTOPRAZOLE SODIUM 40 MILLIGRAM(S): 20 TABLET, DELAYED RELEASE ORAL at 11:42

## 2020-10-18 RX ADMIN — HYDROMORPHONE HYDROCHLORIDE 1 MILLIGRAM(S): 2 INJECTION INTRAMUSCULAR; INTRAVENOUS; SUBCUTANEOUS at 20:36

## 2020-10-18 RX ADMIN — SODIUM CHLORIDE 100 MILLILITER(S): 9 INJECTION, SOLUTION INTRAVENOUS at 03:52

## 2020-10-18 RX ADMIN — PIPERACILLIN AND TAZOBACTAM 25 GRAM(S): 4; .5 INJECTION, POWDER, LYOPHILIZED, FOR SOLUTION INTRAVENOUS at 10:03

## 2020-10-18 RX ADMIN — Medication 4: at 05:58

## 2020-10-18 RX ADMIN — OCTREOTIDE ACETATE 100 MICROGRAM(S): 200 INJECTION, SOLUTION INTRAVENOUS; SUBCUTANEOUS at 19:17

## 2020-10-18 RX ADMIN — Medication 4 MILLIGRAM(S): at 05:59

## 2020-10-18 RX ADMIN — OCTREOTIDE ACETATE 100 MICROGRAM(S): 200 INJECTION, SOLUTION INTRAVENOUS; SUBCUTANEOUS at 05:59

## 2020-10-18 RX ADMIN — HYDROMORPHONE HYDROCHLORIDE 1 MILLIGRAM(S): 2 INJECTION INTRAMUSCULAR; INTRAVENOUS; SUBCUTANEOUS at 18:52

## 2020-10-18 RX ADMIN — HYDROMORPHONE HYDROCHLORIDE 1 MILLIGRAM(S): 2 INJECTION INTRAMUSCULAR; INTRAVENOUS; SUBCUTANEOUS at 11:58

## 2020-10-18 RX ADMIN — HYDROMORPHONE HYDROCHLORIDE 0.5 MILLIGRAM(S): 2 INJECTION INTRAMUSCULAR; INTRAVENOUS; SUBCUTANEOUS at 01:10

## 2020-10-18 NOTE — PROGRESS NOTE ADULT - ASSESSMENT
80F w/ HTN, DM, Colon Ca s/p colostomy (4/2019) s/p chemo w/ stage 4 recurrence. 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 Admitted to ICU with septic shock, FELICITAS, demand ischemia and medical mgmt of SBO.     #Neuro - pain control w/ dilaudid PRN  #CV - remains in septic shock, titrate NE to MAP>/65  #Pulm - no active issues  #ID- currently on zosyn for intra-abdominal infection; blood, urine cx NGTD  #Renal/metabolic - FELICITAS likely prerenal ATN; electrolytes acceptable, good UOP; continue w/ maintenance fluid D5LR  #GI- has high grade SBO, no plans for OR, continue w/ medical mgmt; NGT, NPO, protonix; on octreotide and steroids for malignant bowel obstruction  #Heme - hgb stable, HSQ; noted to have high grade stenosis in iliac arteries - no intervention at this time as per vascular surgery  #Endo - FS acceptable, ISS coverage  #PPx - HSQ for DVT ppx  #Dispo- remains in ICU in critical condition 80F w/ HTN, DM, Colon Ca s/p colostomy (4/2019) s/p chemo w/ stage 4 recurrence. 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 Admitted to ICU with septic shock, FELICITAS, demand ischemia and medical mgmt of SBO.     #Neuro - pain control w/ dilaudid PRN, start fentanyl patch   #CV - remains in septic shock, titrate NE to MAP>/65  #Pulm - no active issues  #ID- currently on zosyn for intra-abdominal infection; blood, urine cx NGTD  #Renal/metabolic - FELICITAS likely prerenal ATN; electrolytes acceptable, good UOP; continue w/ maintenance fluid D5LR  #GI- has high grade SBO, no plans for OR, continue w/ medical mgmt; NGT, NPO, protonix; on octreotide and steroids for malignant bowel obstruction  #Heme - hgb stable, HSQ; noted to have high grade stenosis in iliac arteries - no intervention at this time as per vascular surgery  #Endo - FS acceptable, ISS coverage  #PPx - HSQ for DVT ppx  #Dispo- remains in ICU in critical condition 80F w/ HTN, DM, Colon Ca s/p colostomy (4/2019) s/p chemo w/ stage 4 recurrence. 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 Admitted to ICU with septic shock, FELICITAS, demand ischemia and medical mgmt of SBO.     #Neuro - pain control w/ dilaudid PRN, titrate up as tolerated, start fentanyl patch   #CV - remains in septic shock, titrate NE to MAP>/65  #Pulm - no active issues  #ID- currently on zosyn for intra-abdominal infection; blood, urine cx NGTD  #Renal/metabolic - FELICITAS likely prerenal ATN; electrolytes acceptable, good UOP; continue w/ maintenance fluid D5LR  #GI- has high grade SBO, no plans for OR, continue w/ medical mgmt; NGT, NPO, protonix; on octreotide and steroids for malignant bowel obstruction  #Heme - hgb stable, HSQ; noted to have high grade stenosis in iliac arteries - no intervention at this time as per vascular surgery  #Endo - FS acceptable, ISS coverage  #PPx - HSQ for DVT ppx  #Dispo- remains in ICU in critical condition

## 2020-10-18 NOTE — PROGRESS NOTE ADULT - SUBJECTIVE AND OBJECTIVE BOX
CHIEF COMPLAINT:    Interval Events:    REVIEW OF SYSTEMS:  Constitutional: [ ] fevers [ ] chills [ ] weight loss [ ] weight gain  HEENT: [ ] dry eyes [ ] eye irritation [ ] postnasal drip [ ] nasal congestion  CV: [ ] chest pain [ ] orthopnea [ ] palpitations [ ] murmur  Resp: [ ] cough [ ] shortness of breath [ ] dyspnea [ ] wheezing [ ] sputum [ ] hemoptysis  GI: [ ] nausea [ ] vomiting [ ] diarrhea [ ] constipation [ ] abd pain [ ] dysphagia   : [ ] dysuria [ ] nocturia [ ] hematuria [ ] increased urinary frequency  Musculoskeletal: [ ] back pain [ ] myalgias [ ] arthralgias [ ] fracture  Skin: [ ] rash [ ] itch  Neurological: [ ] headache [ ] dizziness [ ] syncope [ ] weakness [ ] numbness  Hematologic/Lymphatic: [ ] anemia [ ] bleeding problem  Allergic/Immunologic: [ ] itchy eyes [ ] nasal discharge [ ] hives [ ] angioedema  [ ] All other systems negative  [ ] Unable to assess ROS because ________    OBJECTIVE:  ICU Vital Signs Last 24 Hrs  T(C): 36.7 (18 Oct 2020 04:20), Max: 37.1 (17 Oct 2020 23:46)  T(F): 98 (18 Oct 2020 04:20), Max: 98.7 (17 Oct 2020 23:46)  HR: 91 (18 Oct 2020 07:22) (83 - 110)  BP: 127/68 (18 Oct 2020 07:00) (111/96 - 130/94)  BP(mean): 83 (18 Oct 2020 07:00) (63 - 103)  ABP: 92/82 (17 Oct 2020 20:20) (88/57 - 149/56)  ABP(mean): 36 (17 Oct 2020 20:30) (36 - 81)  RR: 17 (18 Oct 2020 07:22) (14 - 33)  SpO2: 100% (18 Oct 2020 07:22) (94% - 100%)        10-17 @ 07:01  -  10-18 @ 07:00  --------------------------------------------------------  IN: 3307.3 mL / OUT: 485 mL / NET: 2822.3 mL      CAPILLARY BLOOD GLUCOSE      POCT Blood Glucose.: 223 mg/dL (18 Oct 2020 05:41)      PHYSICAL EXAM:  General:   HEENT:   Neck:   Respiratory:   Cardiovascular:   Abdomen:   Extremities:   Skin:   Neurological:  Psychiatry:    LINES:    HOSPITAL MEDICATIONS:  MEDICATIONS  (STANDING):  chlorhexidine 4% Liquid 1 Application(s) Topical <User Schedule>  dexAMETHasone  Injectable 4 milliGRAM(s) IV Push every 12 hours  dextrose 5% + lactated ringers. 1000 milliLiter(s) (100 mL/Hr) IV Continuous <Continuous>  dextrose 5%. 1000 milliLiter(s) (50 mL/Hr) IV Continuous <Continuous>  dextrose 50% Injectable 12.5 Gram(s) IV Push once  dextrose 50% Injectable 25 Gram(s) IV Push once  dextrose 50% Injectable 25 Gram(s) IV Push once  heparin   Injectable 5000 Unit(s) SubCutaneous every 8 hours  influenza   Vaccine 0.5 milliLiter(s) IntraMuscular once  insulin lispro (HumaLOG) corrective regimen sliding scale   SubCutaneous every 6 hours  norepinephrine Infusion 0.05 MICROgram(s)/kG/Min (2.93 mL/Hr) IV Continuous <Continuous>  octreotide  Injectable 100 MICROGram(s) SubCutaneous three times a day  pantoprazole  Injectable 40 milliGRAM(s) IV Push daily  piperacillin/tazobactam IVPB.. 3.375 Gram(s) IV Intermittent every 12 hours    MEDICATIONS  (PRN):  dextrose 40% Gel 15 Gram(s) Oral once PRN Blood Glucose LESS THAN 70 milliGRAM(s)/deciliter  glucagon  Injectable 1 milliGRAM(s) IntraMuscular once PRN Glucose LESS THAN 70 milligrams/deciliter  HYDROmorphone  Injectable 0.5 milliGRAM(s) IV Push every 4 hours PRN Severe Pain (7 - 10)  ondansetron Injectable 4 milliGRAM(s) IV Push every 6 hours PRN Nausea and/or Vomiting  sodium chloride 0.9% lock flush 10 milliLiter(s) IV Push every 1 hour PRN Pre/post blood products, medications, blood draw, and to maintain line patency      LABS:                        10.0   14.97 )-----------( 245      ( 18 Oct 2020 03:09 )             28.2     Hgb Trend: 10.0<--, 10.6<--, 9.8<--, 13.0<--  10-18    135  |  107  |  77<H>  ----------------------------<  238<H>  4.6   |  17<L>  |  2.69<H>    Ca    6.7<L>      18 Oct 2020 03:09  Phos  3.9     10-18  Mg     2.0     10-18    TPro  3.8<L>  /  Alb  1.1<L>  /  TBili  0.3  /  DBili  x   /  AST  188<H>  /  ALT  128<H>  /  AlkPhos  64  10-17              MICROBIOLOGY:     RADIOLOGY:  [ ] Reviewed and interpreted by me CHIEF COMPLAINT:    Interval Events:  no o/n events    REVIEW OF SYSTEMS:  Constitutional: [- ] fevers [- ] chills [ ] weight loss [ ] weight gain  HEENT: [ ] dry eyes [ ] eye irritation [ ] postnasal drip [ ] nasal congestion  CV: [- ] chest pain [ -] orthopnea [ ] palpitations [ ] murmur  Resp: [- ] cough [- ] shortness of breath [- ] dyspnea [- ] wheezing [ ] sputum [ ] hemoptysis  GI: [- ] nausea [ -] vomiting [ ] diarrhea [ ] constipation [+ ] abd pain [ ] dysphagia   : [- ] dysuria [ ] nocturia [ ] hematuria [ ] increased urinary frequency  Musculoskeletal: [ -] back pain [ +] myalgias [ ] arthralgias [ ] fracture  Skin: [- ] rash [ ] itch  Neurological: [- ] headache [ -] dizziness [ ] syncope [ ] weakness [ ] numbness  Hematologic/Lymphatic: [ ] anemia [ ] bleeding problem  Allergic/Immunologic: [ ] itchy eyes [ ] nasal discharge [ ] hives [ ] angioedema  [ x] All other systems negative  [ ] Unable to assess ROS because ________    OBJECTIVE:  ICU Vital Signs Last 24 Hrs  T(C): 36.7 (18 Oct 2020 04:20), Max: 37.1 (17 Oct 2020 23:46)  T(F): 98 (18 Oct 2020 04:20), Max: 98.7 (17 Oct 2020 23:46)  HR: 91 (18 Oct 2020 07:22) (83 - 110)  BP: 127/68 (18 Oct 2020 07:00) (111/96 - 130/94)  BP(mean): 83 (18 Oct 2020 07:00) (63 - 103)  ABP: 92/82 (17 Oct 2020 20:20) (88/57 - 149/56)  ABP(mean): 36 (17 Oct 2020 20:30) (36 - 81)  RR: 17 (18 Oct 2020 07:22) (14 - 33)  SpO2: 100% (18 Oct 2020 07:22) (94% - 100%)        10-17 @ 07:01  -  10-18 @ 07:00  --------------------------------------------------------  IN: 3307.3 mL / OUT: 485 mL / NET: 2822.3 mL      CAPILLARY BLOOD GLUCOSE      POCT Blood Glucose.: 223 mg/dL (18 Oct 2020 05:41)      PHYSICAL EXAM:  General: mild distress, non-toxic appearing  HEENT: dry MM, EOMI  Neck: supple  Respiratory: CTA b/l  Cardiovascular: s1s2 RRR  Abdomen: TTP diffusely, some guarding, unable to hear BS  Extremities: cool RLE, warm LLE; no edema  Skin: mottled RLE  Neurological: no focal deficits  Psychiatry: AAOx3    LINES:    HOSPITAL MEDICATIONS:  MEDICATIONS  (STANDING):  chlorhexidine 4% Liquid 1 Application(s) Topical <User Schedule>  dexAMETHasone  Injectable 4 milliGRAM(s) IV Push every 12 hours  dextrose 5% + lactated ringers. 1000 milliLiter(s) (100 mL/Hr) IV Continuous <Continuous>  dextrose 5%. 1000 milliLiter(s) (50 mL/Hr) IV Continuous <Continuous>  dextrose 50% Injectable 12.5 Gram(s) IV Push once  dextrose 50% Injectable 25 Gram(s) IV Push once  dextrose 50% Injectable 25 Gram(s) IV Push once  heparin   Injectable 5000 Unit(s) SubCutaneous every 8 hours  influenza   Vaccine 0.5 milliLiter(s) IntraMuscular once  insulin lispro (HumaLOG) corrective regimen sliding scale   SubCutaneous every 6 hours  norepinephrine Infusion 0.05 MICROgram(s)/kG/Min (2.93 mL/Hr) IV Continuous <Continuous>  octreotide  Injectable 100 MICROGram(s) SubCutaneous three times a day  pantoprazole  Injectable 40 milliGRAM(s) IV Push daily  piperacillin/tazobactam IVPB.. 3.375 Gram(s) IV Intermittent every 12 hours    MEDICATIONS  (PRN):  dextrose 40% Gel 15 Gram(s) Oral once PRN Blood Glucose LESS THAN 70 milliGRAM(s)/deciliter  glucagon  Injectable 1 milliGRAM(s) IntraMuscular once PRN Glucose LESS THAN 70 milligrams/deciliter  HYDROmorphone  Injectable 0.5 milliGRAM(s) IV Push every 4 hours PRN Severe Pain (7 - 10)  ondansetron Injectable 4 milliGRAM(s) IV Push every 6 hours PRN Nausea and/or Vomiting  sodium chloride 0.9% lock flush 10 milliLiter(s) IV Push every 1 hour PRN Pre/post blood products, medications, blood draw, and to maintain line patency      LABS:                        10.0   14.97 )-----------( 245      ( 18 Oct 2020 03:09 )             28.2     Hgb Trend: 10.0<--, 10.6<--, 9.8<--, 13.0<--  10-18    135  |  107  |  77<H>  ----------------------------<  238<H>  4.6   |  17<L>  |  2.69<H>    Ca    6.7<L>      18 Oct 2020 03:09  Phos  3.9     10-18  Mg     2.0     10-18    TPro  3.8<L>  /  Alb  1.1<L>  /  TBili  0.3  /  DBili  x   /  AST  188<H>  /  ALT  128<H>  /  AlkPhos  64  10-17              MICROBIOLOGY:     RADIOLOGY:  [ ] Reviewed and interpreted by me CHIEF COMPLAINT:    Interval Events:  no o/n events    REVIEW OF SYSTEMS:  Constitutional: [- ] fevers [- ] chills [ ] weight loss [ ] weight gain  HEENT: [ ] dry eyes [ ] eye irritation [ ] postnasal drip [ ] nasal congestion  CV: [- ] chest pain [ -] orthopnea [ ] palpitations [ ] murmur  Resp: [- ] cough [- ] shortness of breath [- ] dyspnea [- ] wheezing [ ] sputum [ ] hemoptysis  GI: [- ] nausea [ -] vomiting [ ] diarrhea [ ] constipation [+ ] abd pain [ ] dysphagia   : [- ] dysuria [ ] nocturia [ ] hematuria [ ] increased urinary frequency  Musculoskeletal: [ -] back pain [ +] myalgias [ ] arthralgias [ ] fracture  Skin: [- ] rash [ ] itch  Neurological: [- ] headache [ -] dizziness [ ] syncope [ ] weakness [ ] numbness  Hematologic/Lymphatic: [ ] anemia [ ] bleeding problem  Allergic/Immunologic: [ ] itchy eyes [ ] nasal discharge [ ] hives [ ] angioedema  [ x] All other systems negative    OBJECTIVE:  ICU Vital Signs Last 24 Hrs  T(C): 36.7 (18 Oct 2020 04:20), Max: 37.1 (17 Oct 2020 23:46)  T(F): 98 (18 Oct 2020 04:20), Max: 98.7 (17 Oct 2020 23:46)  HR: 91 (18 Oct 2020 07:22) (83 - 110)  BP: 127/68 (18 Oct 2020 07:00) (111/96 - 130/94)  BP(mean): 83 (18 Oct 2020 07:00) (63 - 103)  ABP: 92/82 (17 Oct 2020 20:20) (88/57 - 149/56)  ABP(mean): 36 (17 Oct 2020 20:30) (36 - 81)  RR: 17 (18 Oct 2020 07:22) (14 - 33)  SpO2: 100% (18 Oct 2020 07:22) (94% - 100%)        10-17 @ 07:01  -  10-18 @ 07:00  --------------------------------------------------------  IN: 3307.3 mL / OUT: 485 mL / NET: 2822.3 mL      CAPILLARY BLOOD GLUCOSE      POCT Blood Glucose.: 223 mg/dL (18 Oct 2020 05:41)      PHYSICAL EXAM:  General: mild distress, non-toxic appearing  HEENT: dry MM, EOMI  Neck: supple  Respiratory: CTA b/l  Cardiovascular: s1s2 RRR  Abdomen: TTP diffusely, some guarding, unable to hear BS  Extremities: cool RLE, warm LLE; no edema  Skin: mottled RLE  Neurological: no focal deficits  Psychiatry: AAOx3    LINES:    HOSPITAL MEDICATIONS:  MEDICATIONS  (STANDING):  chlorhexidine 4% Liquid 1 Application(s) Topical <User Schedule>  dexAMETHasone  Injectable 4 milliGRAM(s) IV Push every 12 hours  dextrose 5% + lactated ringers. 1000 milliLiter(s) (100 mL/Hr) IV Continuous <Continuous>  dextrose 5%. 1000 milliLiter(s) (50 mL/Hr) IV Continuous <Continuous>  dextrose 50% Injectable 12.5 Gram(s) IV Push once  dextrose 50% Injectable 25 Gram(s) IV Push once  dextrose 50% Injectable 25 Gram(s) IV Push once  heparin   Injectable 5000 Unit(s) SubCutaneous every 8 hours  influenza   Vaccine 0.5 milliLiter(s) IntraMuscular once  insulin lispro (HumaLOG) corrective regimen sliding scale   SubCutaneous every 6 hours  norepinephrine Infusion 0.05 MICROgram(s)/kG/Min (2.93 mL/Hr) IV Continuous <Continuous>  octreotide  Injectable 100 MICROGram(s) SubCutaneous three times a day  pantoprazole  Injectable 40 milliGRAM(s) IV Push daily  piperacillin/tazobactam IVPB.. 3.375 Gram(s) IV Intermittent every 12 hours    MEDICATIONS  (PRN):  dextrose 40% Gel 15 Gram(s) Oral once PRN Blood Glucose LESS THAN 70 milliGRAM(s)/deciliter  glucagon  Injectable 1 milliGRAM(s) IntraMuscular once PRN Glucose LESS THAN 70 milligrams/deciliter  HYDROmorphone  Injectable 0.5 milliGRAM(s) IV Push every 4 hours PRN Severe Pain (7 - 10)  ondansetron Injectable 4 milliGRAM(s) IV Push every 6 hours PRN Nausea and/or Vomiting  sodium chloride 0.9% lock flush 10 milliLiter(s) IV Push every 1 hour PRN Pre/post blood products, medications, blood draw, and to maintain line patency      LABS:                        10.0   14.97 )-----------( 245      ( 18 Oct 2020 03:09 )             28.2     Hgb Trend: 10.0<--, 10.6<--, 9.8<--, 13.0<--  10-18    135  |  107  |  77<H>  ----------------------------<  238<H>  4.6   |  17<L>  |  2.69<H>    Ca    6.7<L>      18 Oct 2020 03:09  Phos  3.9     10-18  Mg     2.0     10-18    TPro  3.8<L>  /  Alb  1.1<L>  /  TBili  0.3  /  DBili  x   /  AST  188<H>  /  ALT  128<H>  /  AlkPhos  64  10-17              MICROBIOLOGY:     RADIOLOGY:  [ ] Reviewed and interpreted by me

## 2020-10-18 NOTE — DIETITIAN INITIAL EVALUATION ADULT. - OTHER INFO
Pt w/ HTN ; DM2 ; Colon CA s/p colostomy 4/20/19, s/p chemo w/ stage 4 recurrence. Pt w/ AMS & Abd pain. Adm to ICU w/ septic shock ; FELICITAS ; demand ischemia & medical mabnagement of SBO. Pt is w/ [place] sump to LWS. Continue supportive care w/ IV fluids -> prognosis is very poor. Sister reports pt to be 5'2".

## 2020-10-18 NOTE — DIETITIAN INITIAL EVALUATION ADULT. - PERTINENT MEDS FT
MEDICATIONS  (STANDING):  chlorhexidine 4% Liquid 1 Application(s) Topical <User Schedule>  dexAMETHasone  Injectable 4 milliGRAM(s) IV Push every 12 hours  dextrose 5% + lactated ringers. 1000 milliLiter(s) (100 mL/Hr) IV Continuous <Continuous>  dextrose 5%. 1000 milliLiter(s) (50 mL/Hr) IV Continuous <Continuous>  dextrose 50% Injectable 12.5 Gram(s) IV Push once  dextrose 50% Injectable 25 Gram(s) IV Push once  dextrose 50% Injectable 25 Gram(s) IV Push once  fentaNYL   Patch  50 MICROgram(s)/Hr. 1 Patch Transdermal every 48 hours  heparin   Injectable 5000 Unit(s) SubCutaneous every 8 hours  influenza   Vaccine 0.5 milliLiter(s) IntraMuscular once  insulin lispro (HumaLOG) corrective regimen sliding scale   SubCutaneous every 6 hours  norepinephrine Infusion 0.05 MICROgram(s)/kG/Min (2.93 mL/Hr) IV Continuous <Continuous>  octreotide  Injectable 100 MICROGram(s) SubCutaneous three times a day  pantoprazole  Injectable 40 milliGRAM(s) IV Push daily  piperacillin/tazobactam IVPB.. 3.375 Gram(s) IV Intermittent every 12 hours    MEDICATIONS  (PRN):  dextrose 40% Gel 15 Gram(s) Oral once PRN Blood Glucose LESS THAN 70 milliGRAM(s)/deciliter  glucagon  Injectable 1 milliGRAM(s) IntraMuscular once PRN Glucose LESS THAN 70 milligrams/deciliter  HYDROmorphone  Injectable 1 milliGRAM(s) IV Push every 4 hours PRN Severe Pain (7 - 10)  ondansetron Injectable 4 milliGRAM(s) IV Push every 6 hours PRN Nausea and/or Vomiting  sodium chloride 0.9% lock flush 10 milliLiter(s) IV Push every 1 hour PRN Pre/post blood products, medications, blood draw, and to maintain line patency

## 2020-10-18 NOTE — DIETITIAN INITIAL EVALUATION ADULT. - PERTINENT LABORATORY DATA
10-18 Na135 mmol/L Glu 238 mg/dL<H> K+ 4.6 mmol/L Cr  2.69 mg/dL<H> BUN 77 mg/dL<H> 10-18 Phos 3.9 mg/dL 10-17 Alb 1.1 g/dL<L>10-17  U/L<H>  U/L<H> Alkaline Phosphatase 64 [id number]/[id number] @ 12:49 A1C 7.0

## 2020-10-18 NOTE — PROGRESS NOTE ADULT - SUBJECTIVE AND OBJECTIVE BOX
NEPHROLOGY PROGRESS NOTE    CHIEF COMPLAINT:  FELICITAS    HPI:  Renal function deteriorating again with oliguria.  Remains non operative candidate.      EXAM:  T(F): 97 (10-18-20 @ 07:30)  HR: 96 (10-18-20 @ 09:30)  BP: 145/105 (10-18-20 @ 09:30)  RR: 20 (10-18-20 @ 09:30)  SpO2: 99% (10-18-20 @ 09:30)      Normal respiratory effort, lungs clear bilaterally  Heart RRR with no murmur, no peripheral edema         LABS                             10.0   14.97 )-----------( 245      ( 18 Oct 2020 03:09 )             28.2          10-18    135  |  107  |  77<H>  ----------------------------<  238<H>  4.6   |  17<L>  |  2.69<H>    Ca    6.7<L>      18 Oct 2020 03:09  Phos  3.9     10-18  Mg     2.0     10-18    TPro  3.8<L>  /  Alb  1.1<L>  /  TBili  0.3  /  DBili  x   /  AST  188<H>  /  ALT  128<H>  /  AlkPhos  64  10-17             Assessment   FELICITAS - prerenal azotemia  Hypovolemic hyponatremia, better  Metabolic acidosis, dilutional  High grade small bowel obstruction with stage 4 colon cancer    Plan  Continue supportive care with IV fluids  Prognosis very poor

## 2020-10-19 LAB
ANION GAP SERPL CALC-SCNC: 9 MMOL/L — SIGNIFICANT CHANGE UP (ref 5–17)
BUN SERPL-MCNC: 80 MG/DL — HIGH (ref 7–23)
CALCIUM SERPL-MCNC: 7.1 MG/DL — LOW (ref 8.5–10.1)
CHLORIDE SERPL-SCNC: 107 MMOL/L — SIGNIFICANT CHANGE UP (ref 96–108)
CO2 SERPL-SCNC: 20 MMOL/L — LOW (ref 22–31)
CREAT SERPL-MCNC: 3.26 MG/DL — HIGH (ref 0.5–1.3)
GLUCOSE BLDC GLUCOMTR-MCNC: 179 MG/DL — HIGH (ref 70–99)
GLUCOSE BLDC GLUCOMTR-MCNC: 192 MG/DL — HIGH (ref 70–99)
GLUCOSE BLDC GLUCOMTR-MCNC: 233 MG/DL — HIGH (ref 70–99)
GLUCOSE SERPL-MCNC: 188 MG/DL — HIGH (ref 70–99)
HCT VFR BLD CALC: 28.5 % — LOW (ref 34.5–45)
HGB BLD-MCNC: 9.4 G/DL — LOW (ref 11.5–15.5)
MAGNESIUM SERPL-MCNC: 2.3 MG/DL — SIGNIFICANT CHANGE UP (ref 1.6–2.6)
MCHC RBC-ENTMCNC: 30.3 PG — SIGNIFICANT CHANGE UP (ref 27–34)
MCHC RBC-ENTMCNC: 33 GM/DL — SIGNIFICANT CHANGE UP (ref 32–36)
MCV RBC AUTO: 91.9 FL — SIGNIFICANT CHANGE UP (ref 80–100)
NRBC # BLD: 0 /100 WBCS — SIGNIFICANT CHANGE UP (ref 0–0)
PHOSPHATE SERPL-MCNC: 4.1 MG/DL — SIGNIFICANT CHANGE UP (ref 2.5–4.5)
PLATELET # BLD AUTO: 148 K/UL — LOW (ref 150–400)
POTASSIUM SERPL-MCNC: 5 MMOL/L — SIGNIFICANT CHANGE UP (ref 3.5–5.3)
POTASSIUM SERPL-SCNC: 5 MMOL/L — SIGNIFICANT CHANGE UP (ref 3.5–5.3)
RBC # BLD: 3.1 M/UL — LOW (ref 3.8–5.2)
RBC # FLD: 17.8 % — HIGH (ref 10.3–14.5)
SODIUM SERPL-SCNC: 136 MMOL/L — SIGNIFICANT CHANGE UP (ref 135–145)
WBC # BLD: 11.49 K/UL — HIGH (ref 3.8–10.5)
WBC # FLD AUTO: 11.49 K/UL — HIGH (ref 3.8–10.5)

## 2020-10-19 PROCEDURE — 99291 CRITICAL CARE FIRST HOUR: CPT

## 2020-10-19 PROCEDURE — 99233 SBSQ HOSP IP/OBS HIGH 50: CPT

## 2020-10-19 PROCEDURE — 99497 ADVNCD CARE PLAN 30 MIN: CPT | Mod: 25

## 2020-10-19 RX ORDER — SCOPALAMINE 1 MG/3D
1 PATCH, EXTENDED RELEASE TRANSDERMAL
Refills: 0 | Status: DISCONTINUED | OUTPATIENT
Start: 2020-10-19 | End: 2020-10-21

## 2020-10-19 RX ORDER — HYDROMORPHONE HYDROCHLORIDE 2 MG/ML
0.5 INJECTION INTRAMUSCULAR; INTRAVENOUS; SUBCUTANEOUS EVERY 4 HOURS
Refills: 0 | Status: DISCONTINUED | OUTPATIENT
Start: 2020-10-19 | End: 2020-10-20

## 2020-10-19 RX ADMIN — HEPARIN SODIUM 5000 UNIT(S): 5000 INJECTION INTRAVENOUS; SUBCUTANEOUS at 14:18

## 2020-10-19 RX ADMIN — HYDROMORPHONE HYDROCHLORIDE 0.5 MILLIGRAM(S): 2 INJECTION INTRAMUSCULAR; INTRAVENOUS; SUBCUTANEOUS at 14:30

## 2020-10-19 RX ADMIN — HEPARIN SODIUM 5000 UNIT(S): 5000 INJECTION INTRAVENOUS; SUBCUTANEOUS at 05:18

## 2020-10-19 RX ADMIN — SCOPALAMINE 1 PATCH: 1 PATCH, EXTENDED RELEASE TRANSDERMAL at 19:35

## 2020-10-19 RX ADMIN — PIPERACILLIN AND TAZOBACTAM 25 GRAM(S): 4; .5 INJECTION, POWDER, LYOPHILIZED, FOR SOLUTION INTRAVENOUS at 09:16

## 2020-10-19 RX ADMIN — SCOPALAMINE 1 PATCH: 1 PATCH, EXTENDED RELEASE TRANSDERMAL at 18:27

## 2020-10-19 RX ADMIN — HYDROMORPHONE HYDROCHLORIDE 0.5 MILLIGRAM(S): 2 INJECTION INTRAMUSCULAR; INTRAVENOUS; SUBCUTANEOUS at 14:45

## 2020-10-19 RX ADMIN — CHLORHEXIDINE GLUCONATE 1 APPLICATION(S): 213 SOLUTION TOPICAL at 05:25

## 2020-10-19 RX ADMIN — SODIUM CHLORIDE 100 MILLILITER(S): 9 INJECTION, SOLUTION INTRAVENOUS at 00:06

## 2020-10-19 RX ADMIN — Medication 4 MILLIGRAM(S): at 05:18

## 2020-10-19 RX ADMIN — HYDROMORPHONE HYDROCHLORIDE 1 MILLIGRAM(S): 2 INJECTION INTRAMUSCULAR; INTRAVENOUS; SUBCUTANEOUS at 20:30

## 2020-10-19 RX ADMIN — Medication 4: at 14:18

## 2020-10-19 RX ADMIN — HYDROMORPHONE HYDROCHLORIDE 1 MILLIGRAM(S): 2 INJECTION INTRAMUSCULAR; INTRAVENOUS; SUBCUTANEOUS at 19:37

## 2020-10-19 RX ADMIN — PANTOPRAZOLE SODIUM 40 MILLIGRAM(S): 20 TABLET, DELAYED RELEASE ORAL at 14:17

## 2020-10-19 RX ADMIN — FENTANYL CITRATE 1 PATCH: 50 INJECTION INTRAVENOUS at 07:57

## 2020-10-19 RX ADMIN — OCTREOTIDE ACETATE 100 MICROGRAM(S): 200 INJECTION, SOLUTION INTRAVENOUS; SUBCUTANEOUS at 14:17

## 2020-10-19 RX ADMIN — FENTANYL CITRATE 1 PATCH: 50 INJECTION INTRAVENOUS at 19:36

## 2020-10-19 RX ADMIN — Medication 2.93 MICROGRAM(S)/KG/MIN: at 09:16

## 2020-10-19 RX ADMIN — OCTREOTIDE ACETATE 100 MICROGRAM(S): 200 INJECTION, SOLUTION INTRAVENOUS; SUBCUTANEOUS at 05:26

## 2020-10-19 RX ADMIN — Medication 2: at 05:18

## 2020-10-19 NOTE — PROGRESS NOTE ADULT - SUBJECTIVE AND OBJECTIVE BOX
[facility] NEPHROLOGY SERVICES, [facility]  NEPHROLOGY AND HYPERTENSION  [address]  [address]  [place] [address]  [phone]    MD CK BECERRA MD ANDREY GONCHARUK, MD MADHU KORRAPATI, MD YELENA ROSENBERG, MD BINNY KOSHY, MD CHRISTOPHER CAPUTO, MD EDWARD BOVER, MD          Patient events noted  No distress      MEDICATIONS  (STANDING):  chlorhexidine 4% Liquid 1 Application(s) Topical <User Schedule>  dexAMETHasone  Injectable 4 milliGRAM(s) IV Push every 12 hours  dextrose 5% + lactated ringers. 1000 milliLiter(s) (100 mL/Hr) IV Continuous <Continuous>  dextrose 5%. 1000 milliLiter(s) (50 mL/Hr) IV Continuous <Continuous>  dextrose 50% Injectable 12.5 Gram(s) IV Push once  dextrose 50% Injectable 25 Gram(s) IV Push once  dextrose 50% Injectable 25 Gram(s) IV Push once  fentaNYL   Patch  50 MICROgram(s)/Hr. 1 Patch Transdermal every 48 hours  heparin   Injectable 5000 Unit(s) SubCutaneous every 8 hours  influenza   Vaccine 0.5 milliLiter(s) IntraMuscular once  insulin lispro (HumaLOG) corrective regimen sliding scale   SubCutaneous every 6 hours  norepinephrine Infusion 0.05 MICROgram(s)/kG/Min (2.93 mL/Hr) IV Continuous <Continuous>  octreotide  Injectable 100 MICROGram(s) SubCutaneous three times a day  pantoprazole  Injectable 40 milliGRAM(s) IV Push daily  piperacillin/tazobactam IVPB.. 3.375 Gram(s) IV Intermittent every 12 hours    MEDICATIONS  (PRN):  dextrose 40% Gel 15 Gram(s) Oral once PRN Blood Glucose LESS THAN 70 milliGRAM(s)/deciliter  glucagon  Injectable 1 milliGRAM(s) IntraMuscular once PRN Glucose LESS THAN 70 milligrams/deciliter  HYDROmorphone  Injectable 0.5 milliGRAM(s) IV Push every 4 hours PRN Moderate Pain (4 - 6)  HYDROmorphone  Injectable 1 milliGRAM(s) IV Push every 4 hours PRN Severe Pain (7 - 10)  ondansetron Injectable 4 milliGRAM(s) IV Push every 6 hours PRN Nausea and/or Vomiting  sodium chloride 0.9% lock flush 10 milliLiter(s) IV Push every 1 hour PRN Pre/post blood products, medications, blood draw, and to maintain line patency      10-18-20 @ 07:01  -  10-19-20 @ 07:00  --------------------------------------------------------  IN: 2938.2 mL / OUT: 165 mL / NET: 2773.2 mL    10-19-20 @ 07:01  -  10-19-20 @ 12:07  --------------------------------------------------------  IN: 436.9 mL / OUT: 10 mL / NET: 426.9 mL      PHYSICAL EXAM:      T(C): 36.8 (10-19-20 @ 07:37), Max: 37.2 (10-19-20 @ 00:00)  HR: 91 (10-19-20 @ 10:00) (74 - 98)  BP: 112/70 (10-19-20 @ 10:00) (100/73 - 152/95)  RR: 12 (10-19-20 @ 10:00) (5 - 14)  SpO2: 99% (10-19-20 @ 10:00) (97% - 100%)  Wt(kg): --  Lungs clear  Heart S1S2  Abd soft no BS   Extremities:   tr edema                                    9.4    11.49 )-----------( 148      ( 19 Oct 2020 04:19 )             28.5     10-19    136  |  107  |  80<H>  ----------------------------<  188<H>  5.0   |  20<L>  |  3.26<H>    Ca    7.1<L> Albumin, Serum: 1.1 g/dL   Corrected Ca 9.5     19 Oct 2020 04:19  Phos  4.1     10-19  Mg     2.3     10-19          Creatinine Trend: 3.26<--, 2.69<--, 1.93<--, 1.98<--, 2.12<--, 2.38<--          Assessment   FELICITAS - prerenal azotemia; risk for ischemic and nephrotoxic ATN  High grade small bowel obstruction with stage 4 colon cancer    Plan  Continue supportive care with IV fluids  Prognosis very poor  Would defer the prospect of HD support.    Artie Corocran MD

## 2020-10-19 NOTE — PROGRESS NOTE ADULT - SUBJECTIVE AND OBJECTIVE BOX
10-19-20  The events of past few days are evaluated and the patient is examined for vascular insufficiency. Palliative care note is noted , pt is doing poorly. Patient is a 80y old  Female who presents with a chief complaint of abd pain (19 Oct 2020 08:45)    piperacillin/tazobactam IVPB.. 3.375  heparin   Injectable 5000  norepinephrine Infusion 0.05  piperacillin/tazobactam IVPB.. 3.375    Allergies    No Known Allergies    Intolerances        Vital Signs Last 24 Hrs  T(C): 36.8 (19 Oct 2020 07:37), Max: 37.2 (19 Oct 2020 00:00)  T(F): 98.2 (19 Oct 2020 07:37), Max: 98.9 (19 Oct 2020 00:00)  HR: 91 (19 Oct 2020 10:00) (74 - 98)  BP: 112/70 (19 Oct 2020 10:00) (100/73 - 152/95)  BP(mean): 81 (19 Oct 2020 10:00) (63 - 106)  RR: 12 (19 Oct 2020 10:00) (5 - 14)  SpO2: 99% (19 Oct 2020 10:00) (97% - 100%)  I&O's Detail    18 Oct 2020 07:01  -  19 Oct 2020 07:00  --------------------------------------------------------  IN:    dextrose 5% + lactated ringers: 2300 mL    IV PiggyBack: 200 mL    Norepinephrine: 438.2 mL  Total IN: 2938.2 mL    OUT:    Colostomy (mL): 0 mL    Indwelling Catheter - Urethral (mL): 65 mL    Nasogastric/Oral tube (mL): 100 mL  Total OUT: 165 mL    Total NET: 2773.2 mL      19 Oct 2020 07:01  -  19 Oct 2020 13:49  --------------------------------------------------------  IN:    dextrose 5% + lactated ringers: 400 mL    IV PiggyBack: 100 mL    Norepinephrine: 49.2 mL  Total IN: 549.2 mL    OUT:    Indwelling Catheter - Urethral (mL): 10 mL  Total OUT: 10 mL    Total NET: 539.2 mL          Physical Exam:  Awake, obtaunded, in pain, tender abdomen  General: NAD, resting un comfortably in bed  Pulmonary: labored breathing, no respiratory distress  Cardiovascular: NSR  Extremities:  Pulses:   Right:                                                                          Left:  FEM [ ]2+ [ ]1+ [y ]doppler                                             FEM [ ]2+ [y ]1+ [ ]doppler    POP [ ]2+ [ ]1+ [y ]doppler                                             POP [ ]2+ [ ]1+ [y ]doppler    DP [ ]2+ [ ]1+ [ ]doppler                                                DP [ ]2+ [ ]1+ [ ]doppler  PT[ ]2+ [ ]1+ [ ]doppler                                                  PT [ ]2+ [ ]1+ [ ]doppler      LABS:                        9.4    11.49 )-----------( 148      ( 19 Oct 2020 04:19 )             28.5     10-19    136  |  107  |  80<H>  ----------------------------<  188<H>  5.0   |  20<L>  |  3.26<H>    Ca    7.1<L>      19 Oct 2020 04:19  Phos  4.1     10-19  Mg     2.3     10-19        CAPILLARY BLOOD GLUCOSE      POCT Blood Glucose.: 192 mg/dL (19 Oct 2020 05:13)  POCT Blood Glucose.: 179 mg/dL (18 Oct 2020 23:51)  POCT Blood Glucose.: 161 mg/dL (18 Oct 2020 17:37)    Radiology and Additional Studies:    Assessment and Plan: 80yFemaleSEPTIC SHOCK, SMALL BOWEL OBSTRUCTION; LLIAC ARTERY THROMBOS    AMS/ MEDICAL EVAL    MEDICAL EVAL      Colostomy present    H/O colon cancer, stage IV    DM (diabetes mellitus)    HTN (hypertension)    DM (diabetes mellitus)    HLD (hyperlipidemia)    HTN (hypertension)    History of bowel resection    S/P exploratory laparotomy    No significant past surgical history

## 2020-10-19 NOTE — PROGRESS NOTE ADULT - PROBLEM SELECTOR PLAN 4
low albumin   NPO due to obstruction   not a good candidate for parenteral nutrition as it would be a lot of volume and an added risk of infection

## 2020-10-19 NOTE — PROGRESS NOTE ADULT - ASSESSMENT
79 y/o f with PMHx of HTN, DM Colon Ca Stage IV s/p Ex Lap w bowel resection and colostomy creation admitted to ICU with high grade SBO, FELICITAS, lactemia and septic shock. Palliative Care consulted for complex symptom management and GOC.

## 2020-10-19 NOTE — PROGRESS NOTE ADULT - PROBLEM SELECTOR PLAN 1
on fentanyl patch 50mcg  added back dilaudid 0.5 PRN w parameter as pt bradypneic  1mg dosing was held

## 2020-10-19 NOTE — PROGRESS NOTE ADULT - ASSESSMENT
80F PMH HTN, DM, Colon Ca s/p colostomy (4/2019) s/p chemo w/ stage 4 recurrence. 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 Admitted to ICU with severe sepsis with septic shock, high grade SBO, ARF with ATN, demand ischemia. Course with R foot ischemia.     - medical management of high grade SBO per discussion with surgical team and family opting for conservative management  - mottling of skin noted throughout back and extending down RLE  - minimal urine output due to worsening renal failure  - pt going into multiorgan failure  - discussion with daughter and pt's sister at bedside today with family opting for comfort measures. will d/c pressors. dilaudid IVP as needed can transition to dilaudid drip if necessary  - no further blood draws  - MOLST filled and placed in chart  - appreciate palliative eval and follow up    Critical care Time: 35 min

## 2020-10-19 NOTE — PROGRESS NOTE ADULT - PROBLEM SELECTOR PLAN 9
spoke to daughter Mesha Goyal via phone this morning. She is coming in at 3pm with pt's sister to discus further GOC as pt and daughter refusing surgery at this time and pt is not improving with conservative medical management. Explained the role of DNR in regards to her mother's care and how the current medical care would not change only in the event that she suffered a cardiac or resp arrest would we then allow for natural death process. I shared with her that her mother is in fact dying and that it would only be a matter of time and that time frame could not be accurately pin pointed. Mesha stated she did not want her mother to suffer but she wanted to discuss more with her mother's sister who was a nurse here at [employer] for 30 years. They will be in later today to discuss further = will follow up.

## 2020-10-19 NOTE — PROGRESS NOTE ADULT - SUBJECTIVE AND OBJECTIVE BOX
INTERVAL HPI/OVERNIGHT EVENTS:    Code Status:   Allergies    No Known Allergies    Intolerances    MEDICATIONS  (STANDING):  chlorhexidine 4% Liquid 1 Application(s) Topical <User Schedule>  dexAMETHasone  Injectable 4 milliGRAM(s) IV Push every 12 hours  dextrose 5% + lactated ringers. 1000 milliLiter(s) (100 mL/Hr) IV Continuous <Continuous>  dextrose 5%. 1000 milliLiter(s) (50 mL/Hr) IV Continuous <Continuous>  dextrose 50% Injectable 12.5 Gram(s) IV Push once  dextrose 50% Injectable 25 Gram(s) IV Push once  dextrose 50% Injectable 25 Gram(s) IV Push once  fentaNYL   Patch  50 MICROgram(s)/Hr. 1 Patch Transdermal every 48 hours  heparin   Injectable 5000 Unit(s) SubCutaneous every 8 hours  influenza   Vaccine 0.5 milliLiter(s) IntraMuscular once  insulin lispro (HumaLOG) corrective regimen sliding scale   SubCutaneous every 6 hours  norepinephrine Infusion 0.05 MICROgram(s)/kG/Min (2.93 mL/Hr) IV Continuous <Continuous>  octreotide  Injectable 100 MICROGram(s) SubCutaneous three times a day  pantoprazole  Injectable 40 milliGRAM(s) IV Push daily  piperacillin/tazobactam IVPB.. 3.375 Gram(s) IV Intermittent every 12 hours    MEDICATIONS  (PRN):  dextrose 40% Gel 15 Gram(s) Oral once PRN Blood Glucose LESS THAN 70 milliGRAM(s)/deciliter  glucagon  Injectable 1 milliGRAM(s) IntraMuscular once PRN Glucose LESS THAN 70 milligrams/deciliter  HYDROmorphone  Injectable 1 milliGRAM(s) IV Push every 4 hours PRN Severe Pain (7 - 10)  ondansetron Injectable 4 milliGRAM(s) IV Push every 6 hours PRN Nausea and/or Vomiting  sodium chloride 0.9% lock flush 10 milliLiter(s) IV Push every 1 hour PRN Pre/post blood products, medications, blood draw, and to maintain line patency      PRESENT SYMPTOMS: [ ]Unable to obtain due to poor mentation   Source if other than patient:  [ ]Family   [ ]Team     Pain (Impact on QOL):    Location:  Severity:  Minimal acceptable level (0-10 scale):       Quality:       Onset:  Duration:  Aggravating factors:  Relieving Factors  Radiation:    Dyspnea:  Yes [ ] No [ ] - [ ]Mild [ ]Moderate [ ]Severe  Anxiety:    Yes [ ] No [ ] - [ ]Mild [ ]Moderate [ ]Severe  Fatigue:    Yes [ ] No [ ] - [ ]Mild [ ]Moderate [ ]Severe  Nausea:    Yes [ ] No [ ] - [ ]Mild [ ]Moderate [ ]Severe                         Loss of appetite: Yes [ ] No [ ] - [ ]Mild [ ]Moderate [ ]Severe             Constipation:  Yes [ ] No [ ] - [ ]Mild [ ]Moderate [ ]Severe  Grief: Yes [ ] No [ ]     PAIN AD Score:	  [email or web address] (Ctrl + left click to view)    Other Symptoms:  [ ]All other review of systems negative     Karnofsky Performance Score/Palliative Performance Status Version 2:         %    http://palliative.info/resource_material/[email or web address].pdf    PHYSICAL EXAM:  Vital Signs Last 24 Hrs  T(C): 36.8 (19 Oct 2020 07:37), Max: 37.2 (19 Oct 2020 00:00)  T(F): 98.2 (19 Oct 2020 07:37), Max: 98.9 (19 Oct 2020 00:00)  HR: 81 (19 Oct 2020 07:37) (74 - 98)  BP: 132/95 (19 Oct 2020 07:37) (100/73 - 152/95)  BP(mean): 100 (19 Oct 2020 07:37) (66 - 111)  RR: 10 (19 Oct 2020 07:37) (5 - 20)  SpO2: 99% (19 Oct 2020 07:37) (96% - 100%) I&O's Summary    18 Oct 2020 07:01  -  19 Oct 2020 07:00  --------------------------------------------------------  IN: 2938.2 mL / OUT: 190 mL / NET: 2748.2 mL    19 Oct 2020 07:01  -  19 Oct 2020 08:45  --------------------------------------------------------  IN: 12.3 mL / OUT: 0 mL / NET: 12.3 mL         GENERAL:  [ ]Alert  [ ]Oriented x   [ ]Lethargic  [ ]Cachexia  [ ]Unarousable  [ ]Verbal  [ ]Non-Verbal  Behavioral:   [ ] Anxiety  [ ] Delirium [ ] Agitation [ ] Other  HEENT:  [ ]Normal   [ ]Dry mouth   [ ]ET Tube/Trach  [ ]Oral lesions  PULMONARY:   [ ]Clear [ ]Tachypnea  [ ]Audible excessive secretions   [ ]Rhonchi        [ ]Right [ ]Left [ ]Bilateral  [ ]Crackles        [ ]Right [ ]Left [ ]Bilateral  [ ]Wheezing     [ ]Right [ ]Left [ ]Bilateral  CARDIOVASCULAR:    [ ]Regular [ ]Irregular [ ]Tachy  [ ]Sami [ ]Murmur [ ]Other  GASTROINTESTINAL:  [ ]Soft  [ ]Distended   [ ]+BS  [ ]Non tender [ ]Tender  [ ]PEG [ ]OGT/ NGT   Last BM:   10-16-20 @ 07:01  -  10-17-20 @ 07:00  --------------------------------------------------------  OUT: 200 mL    10-17-20 @ 07:01  -  10-18-20 @ 07:00  --------------------------------------------------------  OUT: 0 mL    10-18-20 @ 07:01  -  10-19-20 @ 07:00  --------------------------------------------------------  OUT: 0 mL       GENITOURINARY:  [ ]Normal [ ] Incontinent   [ ]Oliguria/Anuria   [ ]Szymanski  MUSCULOSKELETAL:   [ ]Normal   [ ]Weakness  [ ]Bed/Wheelchair bound [ ]Edema  NEUROLOGIC:   [ ]No focal deficits  [ ] Cognitive impairment  [ ] Dysphagia [ ]Dysarthria [ ] Paresis [ ]Other   SKIN:   [ ]Normal   [ ]Pressure ulcer(s)  [ ]Rash    CRITICAL CARE:  [ ] Shock Present  [ ]Septic [ ]Cardiogenic [ ]Neurologic [ ]Hypovolemic  [ ]  Vasopressors [ ]  Inotropes   [ ] Respiratory failure present  [ ] Acute  [ ] Chronic [ ] Hypoxic  [ ] Hypercarbic [ ] Other  [ ] Other organ failure     LABS:                        9.4    11.49 )-----------( 148      ( 19 Oct 2020 04:19 )             28.5   10-19    136  |  107  |  80<H>  ----------------------------<  188<H>  5.0   |  20<L>  |  3.26<H>    Ca    7.1<L>      19 Oct 2020 04:19  Phos  4.1     10-19  Mg     2.3     10-19          RADIOLOGY & ADDITIONAL STUDIES:    Protein Calorie Malnutrition Present: [ ] yes [ ] no  [ ] PPSV2 < or = 30%  [ ] significant weight loss [ ] poor nutritional intake [ ] anasarca [ ] catabolic state Albumin, Serum: 1.1 g/dL (10-17-20 @ 04:10)      REFERRALS:   [ ]Chaplaincy  [ ] Hospice  [ ]Child Life  [ ]Social Work  [ ]Case management [ ]Holistic Therapy   Goals of Care Document: INTERVAL HPI/OVERNIGHT EVENTS: appears uncomfortable     Code Status: full  Allergies    No Known Allergies    Intolerances    MEDICATIONS  (STANDING):  chlorhexidine 4% Liquid 1 Application(s) Topical <User Schedule>  dexAMETHasone  Injectable 4 milliGRAM(s) IV Push every 12 hours  dextrose 5% + lactated ringers. 1000 milliLiter(s) (100 mL/Hr) IV Continuous <Continuous>  dextrose 5%. 1000 milliLiter(s) (50 mL/Hr) IV Continuous <Continuous>  dextrose 50% Injectable 12.5 Gram(s) IV Push once  dextrose 50% Injectable 25 Gram(s) IV Push once  dextrose 50% Injectable 25 Gram(s) IV Push once  fentaNYL   Patch  50 MICROgram(s)/Hr. 1 Patch Transdermal every 48 hours  heparin   Injectable 5000 Unit(s) SubCutaneous every 8 hours  influenza   Vaccine 0.5 milliLiter(s) IntraMuscular once  insulin lispro (HumaLOG) corrective regimen sliding scale   SubCutaneous every 6 hours  norepinephrine Infusion 0.05 MICROgram(s)/kG/Min (2.93 mL/Hr) IV Continuous <Continuous>  octreotide  Injectable 100 MICROGram(s) SubCutaneous three times a day  pantoprazole  Injectable 40 milliGRAM(s) IV Push daily  piperacillin/tazobactam IVPB.. 3.375 Gram(s) IV Intermittent every 12 hours    MEDICATIONS  (PRN):  dextrose 40% Gel 15 Gram(s) Oral once PRN Blood Glucose LESS THAN 70 milliGRAM(s)/deciliter  glucagon  Injectable 1 milliGRAM(s) IntraMuscular once PRN Glucose LESS THAN 70 milligrams/deciliter  HYDROmorphone  Injectable 1 milliGRAM(s) IV Push every 4 hours PRN Severe Pain (7 - 10)  ondansetron Injectable 4 milliGRAM(s) IV Push every 6 hours PRN Nausea and/or Vomiting  sodium chloride 0.9% lock flush 10 milliLiter(s) IV Push every 1 hour PRN Pre/post blood products, medications, blood draw, and to maintain line patency      PRESENT SYMPTOMS: [x ]Unable to obtain due to poor mentation   Source if other than patient:  [ ]Family   [ ]Team     Pain (Impact on QOL):  pt not verbalizing but grimacing and moaning   Location:  Severity:  Minimal acceptable level (0-10 scale):       Quality:       Onset:  Duration:  Aggravating factors:  Relieving Factors  Radiation:    Dyspnea:  Yes [ ] No [ ] - [ ]Mild [ ]Moderate [ ]Severe  Anxiety:    Yes [ ] No [ ] - [ ]Mild [ ]Moderate [ ]Severe  Fatigue:    Yes [ ] No [ ] - [ ]Mild [ ]Moderate [ ]Severe  Nausea:    Yes [ ] No [ ] - [ ]Mild [ ]Moderate [ ]Severe                         Loss of appetite: Yes [ ] No [ ] - [ ]Mild [ ]Moderate [ ]Severe             Constipation:  Yes [ ] No [ ] - [ ]Mild [ ]Moderate [ ]Severe  Grief: Yes [ ] No [ ]     PAIN AD Score:	7  [email or web address] (Ctrl + left click to view)    Other Symptoms:  [x ]All other review of systems negative     Karnofsky Performance Score/Palliative Performance Status Version 2:        10 %    http://palliative.info/resource_material/[email or web address].pdf    PHYSICAL EXAM:  Vital Signs Last 24 Hrs  T(C): 36.8 (19 Oct 2020 07:37), Max: 37.2 (19 Oct 2020 00:00)  T(F): 98.2 (19 Oct 2020 07:37), Max: 98.9 (19 Oct 2020 00:00)  HR: 81 (19 Oct 2020 07:37) (74 - 98)  BP: 132/95 (19 Oct 2020 07:37) (100/73 - 152/95)  BP(mean): 100 (19 Oct 2020 07:37) (66 - 111)  RR: 10 (19 Oct 2020 07:37) (5 - 20)  SpO2: 99% (19 Oct 2020 07:37) (96% - 100%) I&O's Summary    18 Oct 2020 07:01  -  19 Oct 2020 07:00  --------------------------------------------------------  IN: 2938.2 mL / OUT: 190 mL / NET: 2748.2 mL    19 Oct 2020 07:01  -  19 Oct 2020 08:45  --------------------------------------------------------  IN: 12.3 mL / OUT: 0 mL / NET: 12.3 mL         GENERAL:  [ ]Alert  [ ]Oriented x   [ x]Lethargic  [x ]Cachexia  [ ]Unarousable  [x ]Verbal  [ ]Non-Verbal  Behavioral:   [ ] Anxiety  [x ] Delirium [ ] Agitation [ ] Other  HEENT:  [ ]Normal   [x ]Dry mouth   [ ]ET Tube/Trach  [ ]Oral lesions  PULMONARY:   [ ]Clear [ ]Tachypnea  [x ]Audible excessive secretions   [x ]Rhonchi        [ ]Right [ ]Left [ x]Bilateral  [ ]Crackles        [ ]Right [ ]Left [ ]Bilateral  [ ]Wheezing     [ ]Right [ ]Left [ ]Bilateral  CARDIOVASCULAR:    [ x]Regular [ ]Irregular [ ]Tachy  [ ]Sami [ ]Murmur [ ]Other  GASTROINTESTINAL:  [ ]Soft  [x ]Distended   [ ]+BS  [ ]Non tender [ x]Tender  [ ]PEG [ x]OGT/ NGT   Last BM: colostomy with scant liquid stool   10-16-20 @ 07:01  -  10-17-20 @ 07:00  --------------------------------------------------------  OUT: 200 mL    10-17-20 @ 07:01  -  10-18-20 @ 07:00  --------------------------------------------------------  OUT: 0 mL    10-18-20 @ 07:01  -  10-19-20 @ 07:00  --------------------------------------------------------  OUT: 0 mL       GENITOURINARY:  [ ]Normal [ ] Incontinent   [x ]Oliguria/Anuria   [x ]Szymanski  MUSCULOSKELETAL:   [ ]Normal   [ ]Weakness  [ x]Bed/Wheelchair bound [ x]Edema- right foot cold and mottled   NEUROLOGIC:   [ ]No focal deficits  [ x] Cognitive impairment  [ ] Dysphagia [ ]Dysarthria [ ] Paresis [ ]Other   SKIN:   [ ]Normal   [x ]Pressure ulcer(s) stage I sacrum  [ ]Rash    CRITICAL CARE:  [ x] Shock Present  [ x]Septic [ ]Cardiogenic [ ]Neurologic [ ]Hypovolemic  [x ]  Vasopressors [ ]  Inotropes   [ ] Respiratory failure present  [ ] Acute  [ ] Chronic [ ] Hypoxic  [ ] Hypercarbic [ ] Other  [x ] Other organ failure     LABS:                        9.4    11.49 )-----------( 148      ( 19 Oct 2020 04:19 )             28.5   10-19    136  |  107  |  80<H>  ----------------------------<  188<H>  5.0   |  20<L>  |  3.26<H>    Ca    7.1<L>      19 Oct 2020 04:19  Phos  4.1     10-19  Mg     2.3     10-19          RADIOLOGY & ADDITIONAL STUDIES:    Protein Calorie Malnutrition Present: [x ] yes [ ] no  [ x] PPSV2 < or = 30%  [ x] significant weight loss [x ] poor nutritional intake [x ] anasarca [x ] catabolic state Albumin, Serum: 1.1 g/dL (10-17-20 @ 04:10)      REFERRALS:   [ ]Chaplaincy  [ ] Hospice  [ ]Child Life  [ ]Social Work  [ ]Case management [ ]Holistic Therapy   Goals of Care Document:

## 2020-10-19 NOTE — PROGRESS NOTE ADULT - ASSESSMENT
10-19-20 the right foot is mottled upto ankle, left plantar foot is mottled.  plan--palliative care, poor prognosis. No vascular intervention

## 2020-10-20 PROCEDURE — 99232 SBSQ HOSP IP/OBS MODERATE 35: CPT

## 2020-10-20 PROCEDURE — 99233 SBSQ HOSP IP/OBS HIGH 50: CPT

## 2020-10-20 RX ORDER — HYDROMORPHONE HYDROCHLORIDE 2 MG/ML
1 INJECTION INTRAMUSCULAR; INTRAVENOUS; SUBCUTANEOUS
Refills: 0 | Status: DISCONTINUED | OUTPATIENT
Start: 2020-10-20 | End: 2020-10-21

## 2020-10-20 RX ORDER — HYDROMORPHONE HYDROCHLORIDE 2 MG/ML
0.5 INJECTION INTRAMUSCULAR; INTRAVENOUS; SUBCUTANEOUS EVERY 4 HOURS
Refills: 0 | Status: DISCONTINUED | OUTPATIENT
Start: 2020-10-20 | End: 2020-10-21

## 2020-10-20 RX ADMIN — CHLORHEXIDINE GLUCONATE 1 APPLICATION(S): 213 SOLUTION TOPICAL at 04:41

## 2020-10-20 RX ADMIN — HYDROMORPHONE HYDROCHLORIDE 0.5 MILLIGRAM(S): 2 INJECTION INTRAMUSCULAR; INTRAVENOUS; SUBCUTANEOUS at 12:35

## 2020-10-20 RX ADMIN — HYDROMORPHONE HYDROCHLORIDE 0.5 MILLIGRAM(S): 2 INJECTION INTRAMUSCULAR; INTRAVENOUS; SUBCUTANEOUS at 12:20

## 2020-10-20 RX ADMIN — HYDROMORPHONE HYDROCHLORIDE 0.5 MILLIGRAM(S): 2 INJECTION INTRAMUSCULAR; INTRAVENOUS; SUBCUTANEOUS at 21:02

## 2020-10-20 RX ADMIN — HYDROMORPHONE HYDROCHLORIDE 0.5 MILLIGRAM(S): 2 INJECTION INTRAMUSCULAR; INTRAVENOUS; SUBCUTANEOUS at 16:33

## 2020-10-20 RX ADMIN — SCOPALAMINE 1 PATCH: 1 PATCH, EXTENDED RELEASE TRANSDERMAL at 19:59

## 2020-10-20 RX ADMIN — PANTOPRAZOLE SODIUM 40 MILLIGRAM(S): 20 TABLET, DELAYED RELEASE ORAL at 11:01

## 2020-10-20 RX ADMIN — SCOPALAMINE 1 PATCH: 1 PATCH, EXTENDED RELEASE TRANSDERMAL at 07:26

## 2020-10-20 RX ADMIN — HYDROMORPHONE HYDROCHLORIDE 1 MILLIGRAM(S): 2 INJECTION INTRAMUSCULAR; INTRAVENOUS; SUBCUTANEOUS at 09:02

## 2020-10-20 RX ADMIN — HYDROMORPHONE HYDROCHLORIDE 1 MILLIGRAM(S): 2 INJECTION INTRAMUSCULAR; INTRAVENOUS; SUBCUTANEOUS at 15:24

## 2020-10-20 RX ADMIN — FENTANYL CITRATE 1 PATCH: 50 INJECTION INTRAVENOUS at 09:56

## 2020-10-20 RX ADMIN — FENTANYL CITRATE 1 PATCH: 50 INJECTION INTRAVENOUS at 07:25

## 2020-10-20 RX ADMIN — HYDROMORPHONE HYDROCHLORIDE 1 MILLIGRAM(S): 2 INJECTION INTRAMUSCULAR; INTRAVENOUS; SUBCUTANEOUS at 15:09

## 2020-10-20 RX ADMIN — HYDROMORPHONE HYDROCHLORIDE 0.5 MILLIGRAM(S): 2 INJECTION INTRAMUSCULAR; INTRAVENOUS; SUBCUTANEOUS at 16:18

## 2020-10-20 RX ADMIN — HYDROMORPHONE HYDROCHLORIDE 1 MILLIGRAM(S): 2 INJECTION INTRAMUSCULAR; INTRAVENOUS; SUBCUTANEOUS at 08:47

## 2020-10-20 RX ADMIN — HYDROMORPHONE HYDROCHLORIDE 0.5 MILLIGRAM(S): 2 INJECTION INTRAMUSCULAR; INTRAVENOUS; SUBCUTANEOUS at 20:47

## 2020-10-20 RX ADMIN — HYDROMORPHONE HYDROCHLORIDE 0.5 MILLIGRAM(S): 2 INJECTION INTRAMUSCULAR; INTRAVENOUS; SUBCUTANEOUS at 03:33

## 2020-10-20 NOTE — PROGRESS NOTE ADULT - SUBJECTIVE AND OBJECTIVE BOX
INTERVAL HPI/OVERNIGHT EVENTS:    Code Status:   Allergies    No Known Allergies    Intolerances    MEDICATIONS  (STANDING):  chlorhexidine 4% Liquid 1 Application(s) Topical <User Schedule>  HYDROmorphone  Injectable 0.5 milliGRAM(s) IV Push every 4 hours  influenza   Vaccine 0.5 milliLiter(s) IntraMuscular once  pantoprazole  Injectable 40 milliGRAM(s) IV Push daily  scopolamine 1 mG/72 Hr(s) Patch 1 Patch Transdermal every 72 hours    MEDICATIONS  (PRN):  HYDROmorphone  Injectable 1 milliGRAM(s) IV Push every 1 hour PRN resp distress/pain/agitation  ondansetron Injectable 4 milliGRAM(s) IV Push every 6 hours PRN Nausea and/or Vomiting  sodium chloride 0.9% lock flush 10 milliLiter(s) IV Push every 1 hour PRN Pre/post blood products, medications, blood draw, and to maintain line patency      PRESENT SYMPTOMS: [ ]Unable to obtain due to poor mentation   Source if other than patient:  [ ]Family   [ ]Team     Pain (Impact on QOL):    Location:  Severity:  Minimal acceptable level (0-10 scale):       Quality:       Onset:  Duration:  Aggravating factors:  Relieving Factors  Radiation:    Dyspnea:  Yes [ ] No [ ] - [ ]Mild [ ]Moderate [ ]Severe  Anxiety:    Yes [ ] No [ ] - [ ]Mild [ ]Moderate [ ]Severe  Fatigue:    Yes [ ] No [ ] - [ ]Mild [ ]Moderate [ ]Severe  Nausea:    Yes [ ] No [ ] - [ ]Mild [ ]Moderate [ ]Severe                         Loss of appetite: Yes [ ] No [ ] - [ ]Mild [ ]Moderate [ ]Severe             Constipation:  Yes [ ] No [ ] - [ ]Mild [ ]Moderate [ ]Severe  Grief: Yes [ ] No [ ]     PAIN AD Score:	  http://geriatrictoolkit.[place].[email or web address]/cog/painad.pdf (Ctrl + left click to view)    Other Symptoms:  [ ]All other review of systems negative     Karnofsky Performance Score/Palliative Performance Status Version 2:      10 %    http://palliative.info/resource_material/PPSv2.pdf    PHYSICAL EXAM:  Vital Signs Last 24 Hrs  T(C): 36.4 (20 Oct 2020 07:30), Max: 36.7 (19 Oct 2020 12:30)  T(F): 97.6 (20 Oct 2020 07:30), Max: 98 (19 Oct 2020 12:30)  HR: 101 (20 Oct 2020 09:00) (89 - 103)  BP: 72/32 (20 Oct 2020 09:00) (69/36 - 164/118)  BP(mean): 43 (20 Oct 2020 09:00) (43 - 127)  RR: 7 (20 Oct 2020 09:00) (7 - 20)  SpO2: 94% (20 Oct 2020 09:00) (93% - 100%) I&O's Summary    19 Oct 2020 07:01  -  20 Oct 2020 07:00  --------------------------------------------------------  IN: 549.2 mL / OUT: 10 mL / NET: 539.2 mL         GENERAL:  [ ]Alert  [ ]Oriented x   [ ]Lethargic  [ ]Cachexia  [ x]Unarousable  [ ]Verbal  [ x]Non-Verbal  Behavioral:   [ ] Anxiety  [ ] Delirium [ ] Agitation [ ] Other  HEENT:  [ ]Normal   [x ]Dry mouth   [ ]ET Tube/Trach  [ ]Oral lesions  PULMONARY:   [ ]Clear [ ]Tachypnea  [x ]Audible excessive secretions   [ ]Rhonchi        [ ]Right [ ]Left [ ]Bilateral  [ ]Crackles        [ ]Right [ ]Left [ ]Bilateral  [ ]Wheezing     [ ]Right [ ]Left [ ]Bilateral  CARDIOVASCULAR:    [ ]Regular [ ]Irregular [x ]Tachy  [ ]Sami [ ]Murmur [ ]Other  GASTROINTESTINAL:  [ ]Soft  [x ]Distended   [ ]+BS- ABSENT BS[ ] NonTender [ ]Tender  [ ]PEG [ xOGT/ NGT   Last BM: colostomy w scant fluid in bag   10-16-20 @ 07:01  -  10-17-20 @ 07:00  --------------------------------------------------------  OUT: 200 mL    10-17-20 @ 07:01  -  10-18-20 @ 07:00  --------------------------------------------------------  OUT: 0 mL    10-18-20 @ 07:01  -  10-19-20 @ 07:00  --------------------------------------------------------  OUT: 0 mL       GENITOURINARY:  [ ]Normal [ ] Incontinent   [x ]Oliguria/Anuria   [ x]Szymanski  MUSCULOSKELETAL:   [ ]Normal   [ ]Weakness  [ x]Bed/Wheelchair bound [x ]Edema- mottled R foot   NEUROLOGIC:   [ ]No focal deficits  [ ] Cognitive impairment  [ ] Dysphagia [ ]Dysarthria [ ] Paresis [x ]Other -obtunded   SKIN:   [ ]Normal   [x ]Pressure ulcer(s) stage I sacrum [ ]Rash    CRITICAL CARE:  [x ] Shock Present  [x ]Septic [ ]Cardiogenic [ ]Neurologic [ ]Hypovolemic  [ ]  Vasopressors [ ]  Inotropes   [ ] Respiratory failure present  [ ] Acute  [ ] Chronic [ ] Hypoxic  [ ] Hypercarbic [ ] Other  [x ] Other organ failure     LABS:                        9.4    11.49 )-----------( 148      ( 19 Oct 2020 04:19 )             28.5   10-19    136  |  107  |  80<H>  ----------------------------<  188<H>  5.0   |  20<L>  |  3.26<H>    Ca    7.1<L>      19 Oct 2020 04:19  Phos  4.1     10-19  Mg     2.3     10-19          RADIOLOGY & ADDITIONAL STUDIES:    Protein Calorie Malnutrition Present: [x] yes [ ] no  [ x] PPSV2 < or = 30%  [x ] significant weight loss [ x] poor nutritional intake [x ] anasarca [x ] catabolic state Albumin, Serum: 1.1 g/dL (10-17-20 @ 04:10)      REFERRALS:   [ ]Chaplaincy  [ ] Hospice  [ ]Child Life  [ ]Social Work  [ ]Case management [ ]Holistic Therapy   Goals of Care Document: AVA Muniz (10-19-20 @ 15:04)  Goals of Care Conversation:   Participants:  · Participants  Family  · Child(jos Goyal  · Relative  Granddaughter and niece also at bedside    Advance Directives:  · Caregiver:  yes  · Name  Mesha  · Phone Number  [phone]    Conversation Discussion:  · Conversation  Diagnosis; Prognosis; MOLST Discussed  · Conversation Details  spoke with daughter Mesha earlier via phone and again this afternoon at bedside with her daughter and niece present. Keiko Hopper had a discussion with them prior to my arrival at bedside, details from combined discussion by Dr. Aden and me reviewed with Mesha. Explained overall care plan with medications being continued and pressors and blood draws being discontinued, that the focus now would solely be on comfort and controlling symptoms as they arise. Mesha asked if there was a time frame of how long her mother had, explained at best we could estimate hours to days but that there are certain clinical cues we look for to help determine when things are progressing. Shared with them that pt had some of these cues but not all of them so hard to estimate and that she could also change rapidly as she is critically ill. Comfort provided.    What Matters Most To Patient and Family:  · What matters most to patient and family  no suffering    Personal Advance Directives Treatment Guidelines:   Treatment Guidelines:  · Decision Maker  Surrogate  · Treatment Guidelines  DNR Order; Comfort measures only    MOLST:  · Completed  19-Oct-2020    Location of Discussion:   Duration of Advanced Care Planning Meeting:  · Time spent (in minutes)  30    Location of Discussion:  · Location of discussion  Face to face      Electronic Signatures:  Yaneth Muniz)  (Signed 19-Oct-2020 15:11)  	Authored: Goals of Care Conversation, Personal Advance Directives Treatment Guidelines, Location of Discussion      Last Updated: 19-Oct-2020 15:11 by Yaneth Muniz)     INTERVAL HPI/OVERNIGHT EVENTS: family opted for comfort care     Code Status: DNR/I  Allergies    No Known Allergies    Intolerances    MEDICATIONS  (STANDING):  chlorhexidine 4% Liquid 1 Application(s) Topical <User Schedule>  HYDROmorphone  Injectable 0.5 milliGRAM(s) IV Push every 4 hours  influenza   Vaccine 0.5 milliLiter(s) IntraMuscular once  pantoprazole  Injectable 40 milliGRAM(s) IV Push daily  scopolamine 1 mG/72 Hr(s) Patch 1 Patch Transdermal every 72 hours    MEDICATIONS  (PRN):  HYDROmorphone  Injectable 1 milliGRAM(s) IV Push every 1 hour PRN resp distress/pain/agitation  ondansetron Injectable 4 milliGRAM(s) IV Push every 6 hours PRN Nausea and/or Vomiting  sodium chloride 0.9% lock flush 10 milliLiter(s) IV Push every 1 hour PRN Pre/post blood products, medications, blood draw, and to maintain line patency      PRESENT SYMPTOMS: [x ]Unable to obtain due to poor mentation   Source if other than patient:  [ ]Family   [ ]Team     Pain (Impact on QOL):    Location:  Severity:  Minimal acceptable level (0-10 scale):       Quality:       Onset:  Duration:  Aggravating factors:  Relieving Factors  Radiation:    Dyspnea:  Yes [ ] No [ ] - [ ]Mild [ ]Moderate [ ]Severe  Anxiety:    Yes [ ] No [ ] - [ ]Mild [ ]Moderate [ ]Severe  Fatigue:    Yes [ ] No [ ] - [ ]Mild [ ]Moderate [ ]Severe  Nausea:    Yes [ ] No [ ] - [ ]Mild [ ]Moderate [ ]Severe                         Loss of appetite: Yes [ ] No [ ] - [ ]Mild [ ]Moderate [ ]Severe             Constipation:  Yes [ ] No [ ] - [ ]Mild [ ]Moderate [ ]Severe  Grief: Yes [ ] No [ ]     PAIN AD Score:	2  http://geriatrictoolkit.[email or web address].[email or web address]/cog/painad.pdf (Ctrl + left click to view)    Other Symptoms:  [ ]All other review of systems negative     Karnofsky Performance Score/Palliative Performance Status Version 2:      10 %    http://palliative.info/resource_material/PPSv2.pdf    PHYSICAL EXAM:  Vital Signs Last 24 Hrs  T(C): 36.4 (20 Oct 2020 07:30), Max: 36.7 (19 Oct 2020 12:30)  T(F): 97.6 (20 Oct 2020 07:30), Max: 98 (19 Oct 2020 12:30)  HR: 101 (20 Oct 2020 09:00) (89 - 103)  BP: 72/32 (20 Oct 2020 09:00) (69/36 - 164/118)  BP(mean): 43 (20 Oct 2020 09:00) (43 - 127)  RR: 7 (20 Oct 2020 09:00) (7 - 20)  SpO2: 94% (20 Oct 2020 09:00) (93% - 100%) I&O's Summary    19 Oct 2020 07:01  -  20 Oct 2020 07:00  --------------------------------------------------------  IN: 549.2 mL / OUT: 10 mL / NET: 539.2 mL         GENERAL:  [ ]Alert  [ ]Oriented x   [ ]Lethargic  [ ]Cachexia  [ x]Unarousable  [ ]Verbal  [ x]Non-Verbal  Behavioral:   [ ] Anxiety  [ ] Delirium [ ] Agitation [ ] Other  HEENT:  [ ]Normal   [x ]Dry mouth   [ ]ET Tube/Trach  [ ]Oral lesions  PULMONARY:   [ ]Clear [ ]Tachypnea  [x ]Audible excessive secretions   [ ]Rhonchi        [ ]Right [ ]Left [ ]Bilateral  [ ]Crackles        [ ]Right [ ]Left [ ]Bilateral  [ ]Wheezing     [ ]Right [ ]Left [ ]Bilateral  CARDIOVASCULAR:    [ ]Regular [ ]Irregular [x ]Tachy  [ ]Sami [ ]Murmur [ ]Other  GASTROINTESTINAL:  [ ]Soft  [x ]Distended   [ ]+BS- ABSENT BS[ ] NonTender [ ]Tender  [ ]PEG [ xOGT/ NGT   Last BM: colostomy w scant fluid in bag   10-16-20 @ 07:01  -  10-17-20 @ 07:00  --------------------------------------------------------  OUT: 200 mL    10-17-20 @ 07:01  -  10-18-20 @ 07:00  --------------------------------------------------------  OUT: 0 mL    10-18-20 @ 07:01  -  10-19-20 @ 07:00  --------------------------------------------------------  OUT: 0 mL       GENITOURINARY:  [ ]Normal [ ] Incontinent   [x ]Oliguria/Anuria   [ x]Szymanski  MUSCULOSKELETAL:   [ ]Normal   [ ]Weakness  [ x]Bed/Wheelchair bound [x ]Edema- mottled R foot   NEUROLOGIC:   [ ]No focal deficits  [ ] Cognitive impairment  [ ] Dysphagia [ ]Dysarthria [ ] Paresis [x ]Other -obtunded   SKIN:   [ ]Normal   [x ]Pressure ulcer(s) stage I sacrum [ ]Rash    CRITICAL CARE:  [x ] Shock Present  [x ]Septic [ ]Cardiogenic [ ]Neurologic [ ]Hypovolemic  [ ]  Vasopressors [ ]  Inotropes   [ ] Respiratory failure present  [ ] Acute  [ ] Chronic [ ] Hypoxic  [ ] Hypercarbic [ ] Other  [x ] Other organ failure     LABS:                        9.4    11.49 )-----------( 148      ( 19 Oct 2020 04:19 )             28.5   10-19    136  |  107  |  80<H>  ----------------------------<  188<H>  5.0   |  20<L>  |  3.26<H>    Ca    7.1<L>      19 Oct 2020 04:19  Phos  4.1     10-19  Mg     2.3     10-19          RADIOLOGY & ADDITIONAL STUDIES:    Protein Calorie Malnutrition Present: [x] yes [ ] no  [ x] PPSV2 < or = 30%  [x ] significant weight loss [ x] poor nutritional intake [x ] anasarca [x ] catabolic state Albumin, Serum: 1.1 g/dL (10-17-20 @ 04:10)      REFERRALS:   [ ]Chaplaincy  [ ] Hospice  [ ]Child Life  [ ]Social Work  [ ]Case management [ ]Holistic Therapy   Goals of Care Document: AVA Muniz (10-19-20 @ 15:04)  Goals of Care Conversation:   Participants:  · Participants  Family  · Child(jos Goyal  · Relative  Granddaughter and niece also at bedside    Advance Directives:  · Caregiver:  yes  · Name  Mesha  · Phone Number  [phone]    Conversation Discussion:  · Conversation  Diagnosis; Prognosis; MOLST Discussed  · Conversation Details  spoke with daughter Mesha earlier via phone and again this afternoon at bedside with her daughter and niece present. Keiko Hopper had a discussion with them prior to my arrival at bedside, details from combined discussion by Dr. Aden and me reviewed with Mesha. Explained overall care plan with medications being continued and pressors and blood draws being discontinued, that the focus now would solely be on comfort and controlling symptoms as they arise. Mesha asked if there was a time frame of how long her mother had, explained at best we could estimate hours to days but that there are certain clinical cues we look for to help determine when things are progressing. Shared with them that pt had some of these cues but not all of them so hard to estimate and that she could also change rapidly as she is critically ill. Comfort provided.    What Matters Most To Patient and Family:  · What matters most to patient and family  no suffering    Personal Advance Directives Treatment Guidelines:   Treatment Guidelines:  · Decision Maker  Surrogate  · Treatment Guidelines  DNR Order; Comfort measures only    MOLST:  · Completed  19-Oct-2020    Location of Discussion:   Duration of Advanced Care Planning Meeting:  · Time spent (in minutes)  30    Location of Discussion:  · Location of discussion  Face to face      Electronic Signatures:  Yaneth Muniz)  (Signed 19-Oct-2020 15:11)  	Authored: Goals of Care Conversation, Personal Advance Directives Treatment Guidelines, Location of Discussion      Last Updated: 19-Oct-2020 15:11 by Yaneth Muniz)

## 2020-10-20 NOTE — PROGRESS NOTE ADULT - SUBJECTIVE AND OBJECTIVE BOX
24 hr events:  on comfort measures  getting dilaudid for pain  hypotensive SBP 60-80s  still arousable at times  no urine output      ## ROS:  [x] unable to obtain due to overall lethargy      ## Labs:  (no new lab work)  CBC:                        9.4    11.49 )-----------( 148      ( 19 Oct 2020 04:19 )             28.5     Chem:  10-19    136  |  107  |  80<H>  ----------------------------<  188<H>  5.0   |  20<L>  |  3.26<H>    Ca    7.1<L>      19 Oct 2020 04:19  Phos  4.1     10-19  Mg     2.3     10-19      ## Medications:    HYDROmorphone  Injectable 0.5 milliGRAM(s) IV Push every 4 hours  HYDROmorphone  Injectable 1 milliGRAM(s) IV Push every 1 hour PRN  ondansetron Injectable 4 milliGRAM(s) IV Push every 6 hours PRN  scopolamine 1 mG/72 Hr(s) Patch 1 Patch Transdermal every 72 hours      ## Vitals:  T(C): 36.8 (10-20-20 @ 13:00), Max: 36.8 (10-20-20 @ 13:00)  HR: 100 (10-20-20 @ 13:00) (92 - 103)  BP: 73/38 (10-20-20 @ 13:00) (66/30 - 95/40)  BP(mean): 44 (10-20-20 @ 13:00) (40 - 52)  RR: 8 (10-20-20 @ 13:00) (7 - 14)  SpO2: 96% (10-20-20 @ 13:00) (93% - 100%)        10-19 @ 07:01  -  10-20 @ 07:00  --------------------------------------------------------  IN: 549.2 mL / OUT: 10 mL / NET: 539.2 mL    10-20 @ 07:01  -  10-20 @ 15:27  --------------------------------------------------------  IN: 0 mL / OUT: 0 mL / NET: 0 mL      ## P/E:  Gen: lying comfortably in bed without significant distress, occasional shaking once pain medication   HEENT: PERRL, EOMI  Resp: CTA B/L no c/r/w  CVS: S1S2 no m/r/g  Abd: soft NT/ND +BS  Ext: no c/c/e  Neuro: A&Ox3    CENTRAL LINE: [ ] YES [ ] NO  LOCATION:   DATE INSERTED:  REMOVE: [ ] YES [ ] NO      MANNING: [ ] YES [ ] NO    DATE INSERTED:  REMOVE:  [ ] YES [ ] NO      A-LINE:  [ ] YES [ ] NO  LOCATION:   DATE INSERTED:  REMOVE:  [ ] YES [ ] NO  EXPLAIN:    GLOBAL ISSUE/BEST PRACTICE:  Analgesia:  Sedation:  HOB elevation: yes  Stress ulcer prophylaxis:  VTE prophylaxis:  Oral Care:  Glycemic control:  Nutrition:    CODE STATUS: [ ] full code  [ ] DNR  [ ] DNI  [ ] [facility]  Goals of care discussion: [ ] yes 24 hr events:  on comfort measures  getting dilaudid for pain  hypotensive SBP 60-80s  still arousable at times  no urine output      ## ROS:  [x] unable to obtain due to overall lethargy      ## Labs:     (no new lab work)      CBC:                        9.4    11.49 )-----------( 148      ( 19 Oct 2020 04:19 )             28.5     Chem:  10-19    136  |  107  |  80<H>  ----------------------------<  188<H>  5.0   |  20<L>  |  3.26<H>    Ca    7.1<L>      19 Oct 2020 04:19  Phos  4.1     10-19  Mg     2.3     10-19      ## Medications:    HYDROmorphone  Injectable 0.5 milliGRAM(s) IV Push every 4 hours  HYDROmorphone  Injectable 1 milliGRAM(s) IV Push every 1 hour PRN  ondansetron Injectable 4 milliGRAM(s) IV Push every 6 hours PRN  scopolamine 1 mG/72 Hr(s) Patch 1 Patch Transdermal every 72 hours      ## Vitals:  T(C): 36.8 (10-20-20 @ 13:00), Max: 36.8 (10-20-20 @ 13:00)  HR: 100 (10-20-20 @ 13:00) (92 - 103)  BP: 73/38 (10-20-20 @ 13:00) (66/30 - 95/40)  BP(mean): 44 (10-20-20 @ 13:00) (40 - 52)  RR: 8 (10-20-20 @ 13:00) (7 - 14)  SpO2: 96% (10-20-20 @ 13:00) (93% - 100%)        10-19 @ 07:01  -  10-20 @ 07:00  --------------------------------------------------------  IN: 549.2 mL / OUT: 10 mL / NET: 539.2 mL    10-20 @ 07:01  -  10-20 @ 15:27  --------------------------------------------------------  IN: 0 mL / OUT: 0 mL / NET: 0 mL      ## P/E:  Gen: lying comfortably in bed without significant distress, occasional shaking once pain medication wears off  HEENT: PERRL, EOMI  Resp: coarse breath sound  CVS: RRR  Abd: no bowel wounds, tender to palpation, distended  Ext: R foot cold and ischemic, mottling noted and progressing, bilateral LE edema  Neuro: lethargic but arousable, grunts at times, occasionally nods to yes/no questions    CENTRAL LINE: [x] YES [ ] NO  LOCATION:  L IJ   DATE INSERTED:  REMOVE: [ ] YES [x] NO      MANNING: [x] YES [ ] NO    DATE INSERTED:  REMOVE:  [ ] YES [x] NO  - for comfort    A-LINE:  [ ] YES [x] NO  LOCATION:   DATE INSERTED:  REMOVE:  [ ] YES [ ] NO  EXPLAIN:    GLOBAL ISSUE/BEST PRACTICE:  Analgesia: dilaudid  Sedation: n/a  HOB elevation: yes  Stress ulcer prophylaxis: n/a  VTE prophylaxis: n/a  Oral Care: n/a  Glycemic control: n/a  Nutrition: npo    CODE STATUS: [ ] full code  [x] DNR  [x] DNI  [x] MEI  Goals of care discussion: [x] yes

## 2020-10-20 NOTE — PATIENT PROFILE ADULT - TOBACCO CESSATION EDUCATION/COUNSELLING(PROVIDED IF TOBACCO USED IN THE PAST 30 DAYS) NON CORE MEASURE SITES
Offered and provided Methotrexate Counseling:  Patient counseled regarding adverse effects of methotrexate including but not limited to nausea, vomiting, abnormalities in liver function tests. Patients may develop mouth sores, rash, diarrhea, and abnormalities in blood counts. The patient understands that monitoring is required including LFT's and blood counts.  There is a rare possibility of scarring of the liver and lung problems that can occur when taking methotrexate. Persistent nausea, loss of appetite, pale stools, dark urine, cough, and shortness of breath should be reported immediately. Patient advised to discontinue methotrexate treatment at least three months before attempting to become pregnant.  I discussed the need for folate supplements while taking methotrexate.  These supplements can decrease side effects during methotrexate treatment. The patient verbalized understanding of the proper use and possible adverse effects of methotrexate.  All of the patient's questions and concerns were addressed.

## 2020-10-20 NOTE — PROGRESS NOTE ADULT - PROBLEM SELECTOR PLAN 9
GOC as above, full comfort   pt is actively dying, family is aware. Pt's sister, niece, daughter and granddaughter have been visiting. Will follow up with them this morning via phone as pt is getting closer to passing.

## 2020-10-20 NOTE — PROGRESS NOTE ADULT - ASSESSMENT
80F PMH HTN, DM, Colon Ca s/p colostomy (4/2019) s/p chemo w/ stage 4 recurrence. 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 Admitted to ICU with severe sepsis with septic shock, high grade SBO, ARF with ATN, demand ischemia. Course with R foot ischemia.     - pt transitioned to comfort measures only  - cont with dilaudid IVP as needed for pain/discomfort  - can transition to IV dilaudid if necessary   - cont with scopolamine patch for secretions  - suction as needed  - pt in dieing process, hypotensive, bradypneic at times  - hospice referral made  - cont supportive care for family

## 2020-10-20 NOTE — PROGRESS NOTE ADULT - SUBJECTIVE AND OBJECTIVE BOX
[facility] NEPHROLOGY SERVICES, [facility]  NEPHROLOGY AND HYPERTENSION  [address]  [address]  [place] [address]  [phone]    MD CK BECERRA, MD WILL BOOKER, MD SANJUANA KINGSLEY, MD MATT MILLER, MD JULIEN NUNEZ MD          Patient events noted    MEDICATIONS  (STANDING):  HYDROmorphone  Injectable 0.5 milliGRAM(s) IV Push every 4 hours  influenza   Vaccine 0.5 milliLiter(s) IntraMuscular once  scopolamine 1 mG/72 Hr(s) Patch 1 Patch Transdermal every 72 hours    MEDICATIONS  (PRN):  HYDROmorphone  Injectable 1 milliGRAM(s) IV Push every 1 hour PRN resp distress/pain/agitation  ondansetron Injectable 4 milliGRAM(s) IV Push every 6 hours PRN Nausea and/or Vomiting  sodium chloride 0.9% lock flush 10 milliLiter(s) IV Push every 1 hour PRN Pre/post blood products, medications, blood draw, and to maintain line patency      10-19-20 @ 07:01  -  10-20-20 @ 07:00  --------------------------------------------------------  IN: 549.2 mL / OUT: 10 mL / NET: 539.2 mL    10-20-20 @ 07:01  -  10-20-20 @ 14:10  --------------------------------------------------------  IN: 0 mL / OUT: 0 mL / NET: 0 mL      PHYSICAL EXAM:      T(C): 36.8 (10-20-20 @ 13:00), Max: 36.8 (10-20-20 @ 13:00)  HR: 100 (10-20-20 @ 13:00) (92 - 103)  BP: 73/38 (10-20-20 @ 13:00) (66/30 - 121/56)  RR: 8 (10-20-20 @ 13:00) (7 - 20)  SpO2: 96% (10-20-20 @ 13:00) (93% - 100%)  Wt(kg): --  Lungs clear  Heart S1S2  Abd soft + tenderness  Extremities:   tr edema                                    9.4    11.49 )-----------( 148      ( 19 Oct 2020 04:19 )             28.5     10-19    136  |  107  |  80<H>  ----------------------------<  188<H>  5.0   |  20<L>  |  3.26<H>    Ca    7.1<L>      19 Oct 2020 04:19  Phos  4.1     10-19  Mg     2.3     10-19          Creatinine Trend: 3.26<--, 2.69<--, 1.93<--, 1.98<--, 2.12<--, 2.38<--      Assessment   FELICITAS - prerenal azotemia; risk for ischemic and nephrotoxic ATN  High grade small bowel obstruction with stage 4 colon cancer    Plan  Comfort care only;     Artie Corcoran MD

## 2020-10-21 ENCOUNTER — TRANSCRIPTION ENCOUNTER (OUTPATIENT)
Age: 80
End: 2020-10-21

## 2020-10-21 ENCOUNTER — APPOINTMENT (OUTPATIENT)
Dept: INFUSION THERAPY | Facility: HOSPITAL | Age: 80
End: 2020-10-21

## 2020-10-21 VITALS — TEMPERATURE: 98 F

## 2020-10-21 LAB
CULTURE RESULTS: SIGNIFICANT CHANGE UP
CULTURE RESULTS: SIGNIFICANT CHANGE UP
SPECIMEN SOURCE: SIGNIFICANT CHANGE UP
SPECIMEN SOURCE: SIGNIFICANT CHANGE UP

## 2020-10-21 PROCEDURE — 99233 SBSQ HOSP IP/OBS HIGH 50: CPT

## 2020-10-21 PROCEDURE — 99239 HOSP IP/OBS DSCHRG MGMT >30: CPT

## 2020-10-21 RX ORDER — NATEGLINIDE 60 MG/1
1 TABLET, COATED ORAL
Qty: 0 | Refills: 0 | DISCHARGE

## 2020-10-21 RX ORDER — METFORMIN HYDROCHLORIDE 850 MG/1
1 TABLET ORAL
Qty: 0 | Refills: 0 | DISCHARGE

## 2020-10-21 RX ORDER — SCOPALAMINE 1 MG/3D
0 PATCH, EXTENDED RELEASE TRANSDERMAL
Qty: 0 | Refills: 0 | DISCHARGE
Start: 2020-10-21

## 2020-10-21 RX ORDER — METOPROLOL TARTRATE 50 MG
1 TABLET ORAL
Qty: 0 | Refills: 0 | DISCHARGE

## 2020-10-21 RX ORDER — ACETAMINOPHEN 500 MG
650 TABLET ORAL EVERY 6 HOURS
Refills: 0 | Status: DISCONTINUED | OUTPATIENT
Start: 2020-10-21 | End: 2020-10-21

## 2020-10-21 RX ORDER — HYDROMORPHONE HYDROCHLORIDE 2 MG/ML
1 INJECTION INTRAMUSCULAR; INTRAVENOUS; SUBCUTANEOUS
Qty: 0 | Refills: 0 | DISCHARGE
Start: 2020-10-21

## 2020-10-21 RX ORDER — EZETIMIBE AND SIMVASTATIN 10; 80 MG/1; MG/1
1 TABLET, FILM COATED ORAL
Qty: 0 | Refills: 0 | DISCHARGE

## 2020-10-21 RX ORDER — RIVAROXABAN 15 MG-20MG
1 KIT ORAL
Qty: 0 | Refills: 0 | DISCHARGE

## 2020-10-21 RX ADMIN — HYDROMORPHONE HYDROCHLORIDE 1 MILLIGRAM(S): 2 INJECTION INTRAMUSCULAR; INTRAVENOUS; SUBCUTANEOUS at 15:15

## 2020-10-21 RX ADMIN — HYDROMORPHONE HYDROCHLORIDE 0.5 MILLIGRAM(S): 2 INJECTION INTRAMUSCULAR; INTRAVENOUS; SUBCUTANEOUS at 08:34

## 2020-10-21 RX ADMIN — HYDROMORPHONE HYDROCHLORIDE 0.5 MILLIGRAM(S): 2 INJECTION INTRAMUSCULAR; INTRAVENOUS; SUBCUTANEOUS at 18:00

## 2020-10-21 RX ADMIN — HYDROMORPHONE HYDROCHLORIDE 1 MILLIGRAM(S): 2 INJECTION INTRAMUSCULAR; INTRAVENOUS; SUBCUTANEOUS at 08:32

## 2020-10-21 RX ADMIN — HYDROMORPHONE HYDROCHLORIDE 0.5 MILLIGRAM(S): 2 INJECTION INTRAMUSCULAR; INTRAVENOUS; SUBCUTANEOUS at 03:58

## 2020-10-21 RX ADMIN — HYDROMORPHONE HYDROCHLORIDE 0.5 MILLIGRAM(S): 2 INJECTION INTRAMUSCULAR; INTRAVENOUS; SUBCUTANEOUS at 04:20

## 2020-10-21 RX ADMIN — HYDROMORPHONE HYDROCHLORIDE 0.5 MILLIGRAM(S): 2 INJECTION INTRAMUSCULAR; INTRAVENOUS; SUBCUTANEOUS at 01:14

## 2020-10-21 RX ADMIN — HYDROMORPHONE HYDROCHLORIDE 0.5 MILLIGRAM(S): 2 INJECTION INTRAMUSCULAR; INTRAVENOUS; SUBCUTANEOUS at 12:36

## 2020-10-21 RX ADMIN — HYDROMORPHONE HYDROCHLORIDE 0.5 MILLIGRAM(S): 2 INJECTION INTRAMUSCULAR; INTRAVENOUS; SUBCUTANEOUS at 12:50

## 2020-10-21 RX ADMIN — SCOPALAMINE 1 PATCH: 1 PATCH, EXTENDED RELEASE TRANSDERMAL at 10:31

## 2020-10-21 RX ADMIN — HYDROMORPHONE HYDROCHLORIDE 1 MILLIGRAM(S): 2 INJECTION INTRAMUSCULAR; INTRAVENOUS; SUBCUTANEOUS at 14:56

## 2020-10-21 RX ADMIN — HYDROMORPHONE HYDROCHLORIDE 0.5 MILLIGRAM(S): 2 INJECTION INTRAMUSCULAR; INTRAVENOUS; SUBCUTANEOUS at 08:45

## 2020-10-21 RX ADMIN — HYDROMORPHONE HYDROCHLORIDE 0.5 MILLIGRAM(S): 2 INJECTION INTRAMUSCULAR; INTRAVENOUS; SUBCUTANEOUS at 00:59

## 2020-10-21 RX ADMIN — Medication 0.5 MILLIGRAM(S): at 11:36

## 2020-10-21 NOTE — PROGRESS NOTE ADULT - SUBJECTIVE AND OBJECTIVE BOX
INTERVAL HPI/OVERNIGHT EVENTS:    Code Status:   Allergies    No Known Allergies    Intolerances    MEDICATIONS  (STANDING):  HYDROmorphone  Injectable 0.5 milliGRAM(s) IV Push every 4 hours  influenza   Vaccine 0.5 milliLiter(s) IntraMuscular once  scopolamine 1 mG/72 Hr(s) Patch 1 Patch Transdermal every 72 hours    MEDICATIONS  (PRN):  acetaminophen  Suppository .. 650 milliGRAM(s) Rectal every 6 hours PRN Temp greater or equal to 38C (100.4F)  HYDROmorphone  Injectable 1 milliGRAM(s) IV Push every 1 hour PRN resp distress/pain/agitation  LORazepam   Injectable 0.5 milliGRAM(s) IV Push every 1 hour PRN Agitation  ondansetron Injectable 4 milliGRAM(s) IV Push every 6 hours PRN Nausea and/or Vomiting  sodium chloride 0.9% lock flush 10 milliLiter(s) IV Push every 1 hour PRN Pre/post blood products, medications, blood draw, and to maintain line patency      PRESENT SYMPTOMS: [ ]Unable to obtain due to poor mentation   Source if other than patient:  [ ]Family   [ ]Team     Pain (Impact on QOL):    Location:  Severity:  Minimal acceptable level (0-10 scale):       Quality:       Onset:  Duration:  Aggravating factors:  Relieving Factors  Radiation:    Dyspnea:  Yes [ ] No [ ] - [ ]Mild [ ]Moderate [ ]Severe  Anxiety:    Yes [ ] No [ ] - [ ]Mild [ ]Moderate [ ]Severe  Fatigue:    Yes [ ] No [ ] - [ ]Mild [ ]Moderate [ ]Severe  Nausea:    Yes [ ] No [ ] - [ ]Mild [ ]Moderate [ ]Severe                         Loss of appetite: Yes [ ] No [ ] - [ ]Mild [ ]Moderate [ ]Severe             Constipation:  Yes [ ] No [ ] - [ ]Mild [ ]Moderate [ ]Severe  Grief: Yes [ ] No [ ]     PAIN AD Score:	  http://geriatrictoolkit.[place].[facility]/cog/painad.pdf (Ctrl + left click to view)    Other Symptoms:  [ ]All other review of systems negative     Karnofsky Performance Score/Palliative Performance Status Version 2:         %    http://palliative.info/resource_material/PPSv2.pdf    PHYSICAL EXAM:  Vital Signs Last 24 Hrs  T(C): 36.9 (21 Oct 2020 07:54), Max: 36.9 (20 Oct 2020 23:14)  T(F): 98.4 (21 Oct 2020 07:54), Max: 98.4 (20 Oct 2020 23:14)  HR: 103 (21 Oct 2020 08:00) (100 - 107)  BP: 72/37 (21 Oct 2020 08:00) (66/30 - 83/41)  BP(mean): 44 (21 Oct 2020 08:00) (40 - 50)  RR: 15 (21 Oct 2020 08:00) (7 - 19)  SpO2: 87% (21 Oct 2020 08:00) (81% - 96%) I&O's Summary    20 Oct 2020 07:01  -  21 Oct 2020 07:00  --------------------------------------------------------  IN: 0 mL / OUT: 0 mL / NET: 0 mL         GENERAL:  [ ]Alert  [ ]Oriented x   [ ]Lethargic  [ ]Cachexia  [ ]Unarousable  [ ]Verbal  [ ]Non-Verbal  Behavioral:   [ ] Anxiety  [ ] Delirium [ ] Agitation [ ] Other  HEENT:  [ ]Normal   [ ]Dry mouth   [ ]ET Tube/Trach  [ ]Oral lesions  PULMONARY:   [ ]Clear [ ]Tachypnea  [ ]Audible excessive secretions   [ ]Rhonchi        [ ]Right [ ]Left [ ]Bilateral  [ ]Crackles        [ ]Right [ ]Left [ ]Bilateral  [ ]Wheezing     [ ]Right [ ]Left [ ]Bilateral  CARDIOVASCULAR:    [ ]Regular [ ]Irregular [ ]Tachy  [ ]Sami [ ]Murmur [ ]Other  GASTROINTESTINAL:  [ ]Soft  [ ]Distended   [ ]+BS  [ ]Non tender [ ]Tender  [ ]PEG [ ]OGT/ NGT   Last BM:   10-16-20 @ 07:01  -  10-17-20 @ 07:00  --------------------------------------------------------  OUT: 200 mL    10-17-20 @ 07:01  -  10-18-20 @ 07:00  --------------------------------------------------------  OUT: 0 mL    10-18-20 @ 07:01  -  10-19-20 @ 07:00  --------------------------------------------------------  OUT: 0 mL       GENITOURINARY:  [ ]Normal [ ] Incontinent   [ ]Oliguria/Anuria   [ ]Szymanski  MUSCULOSKELETAL:   [ ]Normal   [ ]Weakness  [ ]Bed/Wheelchair bound [ ]Edema  NEUROLOGIC:   [ ]No focal deficits  [ ] Cognitive impairment  [ ] Dysphagia [ ]Dysarthria [ ] Paresis [ ]Other   SKIN:   [ ]Normal   [ ]Pressure ulcer(s)  [ ]Rash    CRITICAL CARE:  [ ] Shock Present  [ ]Septic [ ]Cardiogenic [ ]Neurologic [ ]Hypovolemic  [ ]  Vasopressors [ ]  Inotropes   [ ] Respiratory failure present  [ ] Acute  [ ] Chronic [ ] Hypoxic  [ ] Hypercarbic [ ] Other  [ ] Other organ failure     LABS:            RADIOLOGY & ADDITIONAL STUDIES:    Protein Calorie Malnutrition Present: [ ] yes [ ] no  [ ] PPSV2 < or = 30%  [ ] significant weight loss [ ] poor nutritional intake [ ] anasarca [ ] catabolic state Albumin, Serum: 1.1 g/dL (10-17-20 @ 04:10)      REFERRALS:   [ ]Chaplaincy  [ ] Hospice  [ ]Child Life  [ ]Social Work  [ ]Case management [ ]Holistic Therapy   Goals of Care Document: AVA Muniz (10-19-20 @ 15:04)  Goals of Care Conversation:   Participants:  · Participants  Family  · Child(jos Goyal  · Relative  Granddaughter and niece also at bedside    Advance Directives:  · Caregiver:  yes  · Name  Mesha  · Phone Number  [phone]    Conversation Discussion:  · Conversation  Diagnosis; Prognosis; MOLST Discussed  · Conversation Details  spoke with brittni Zimmer earlier via phone and again this afternoon at bedside with her daughter and niece present. Keiko Hopper had a discussion with them prior to my arrival at bedside, details from combined discussion by Dr. Aden and me reviewed with Mesha. Explained overall care plan with medications being continued and pressors and blood draws being discontinued, that the focus now would solely be on comfort and controlling symptoms as they arise. Mesha asked if there was a time frame of how long her mother had, explained at best we could estimate hours to days but that there are certain clinical cues we look for to help determine when things are progressing. Shared with them that pt had some of these cues but not all of them so hard to estimate and that she could also change rapidly as she is critically ill. Comfort provided.    What Matters Most To Patient and Family:  · What matters most to patient and family  no suffering    Personal Advance Directives Treatment Guidelines:   Treatment Guidelines:  · Decision Maker  Surrogate  · Treatment Guidelines  DNR Order; Comfort measures only    MOLST:  · Completed  19-Oct-2020    Location of Discussion:   Duration of Advanced Care Planning Meeting:  · Time spent (in minutes)  30    Location of Discussion:  · Location of discussion  Face to face      Electronic Signatures:  Yaneth Muniz)  (Signed 19-Oct-2020 15:11)  	Authored: Goals of Care Conversation, Personal Advance Directives Treatment Guidelines, Location of Discussion      Last Updated: 19-Oct-2020 15:11 by Yaneth Muniz)     INTERVAL HPI/OVERNIGHT EVENTS: periods of agitation, pulling at NGT, calling out     Code Status: DNR/I  Allergies    No Known Allergies    Intolerances    MEDICATIONS  (STANDING):  HYDROmorphone  Injectable 0.5 milliGRAM(s) IV Push every 4 hours  influenza   Vaccine 0.5 milliLiter(s) IntraMuscular once  scopolamine 1 mG/72 Hr(s) Patch 1 Patch Transdermal every 72 hours    MEDICATIONS  (PRN):  acetaminophen  Suppository .. 650 milliGRAM(s) Rectal every 6 hours PRN Temp greater or equal to 38C (100.4F)  HYDROmorphone  Injectable 1 milliGRAM(s) IV Push every 1 hour PRN resp distress/pain/agitation  LORazepam   Injectable 0.5 milliGRAM(s) IV Push every 1 hour PRN Agitation  ondansetron Injectable 4 milliGRAM(s) IV Push every 6 hours PRN Nausea and/or Vomiting  sodium chloride 0.9% lock flush 10 milliLiter(s) IV Push every 1 hour PRN Pre/post blood products, medications, blood draw, and to maintain line patency      PRESENT SYMPTOMS: [ x]Unable to obtain due to poor mentation   Source if other than patient:  [ ]Family   [ ]Team     Pain (Impact on QOL):    Location:  Severity:  Minimal acceptable level (0-10 scale):       Quality:       Onset:  Duration:  Aggravating factors:  Relieving Factors  Radiation:    Dyspnea:  Yes [ ] No [ ] - [ ]Mild [ ]Moderate [ ]Severe  Anxiety:    Yes [ ] No [ ] - [ ]Mild [ ]Moderate [ ]Severe  Fatigue:    Yes [ ] No [ ] - [ ]Mild [ ]Moderate [ ]Severe  Nausea:    Yes [ ] No [ ] - [ ]Mild [ ]Moderate [ ]Severe                         Loss of appetite: Yes [ ] No [ ] - [ ]Mild [ ]Moderate [ ]Severe             Constipation:  Yes [ ] No [ ] - [ ]Mild [ ]Moderate [ ]Severe  Grief: Yes [ ] No [ ]     PAIN AD Score:	2  http://geriatrictoolkit.[place].edu/cog/painad.pdf (Ctrl + left click to view)    Other Symptoms:  [ ]All other review of systems negative     Karnofsky Performance Score/Palliative Performance Status Version 2:       10  %    http://palliative.info/resource_material/PPSv2.pdf    PHYSICAL EXAM:  Vital Signs Last 24 Hrs  T(C): 36.9 (21 Oct 2020 07:54), Max: 36.9 (20 Oct 2020 23:14)  T(F): 98.4 (21 Oct 2020 07:54), Max: 98.4 (20 Oct 2020 23:14)  HR: 103 (21 Oct 2020 08:00) (100 - 107)  BP: 72/37 (21 Oct 2020 08:00) (66/30 - 83/41)  BP(mean): 44 (21 Oct 2020 08:00) (40 - 50)  RR: 15 (21 Oct 2020 08:00) (7 - 19)  SpO2: 87% (21 Oct 2020 08:00) (81% - 96%) I&O's Summary    20 Oct 2020 07:01  -  21 Oct 2020 07:00  --------------------------------------------------------  IN: 0 mL / OUT: 0 mL / NET: 0 mL         GENERAL:  [ ]Alert  [ ]Oriented x   [x ]Lethargic  [ ]Cachexia  [ ]Unarousable  [ ]Verbal  [ x]Non-Verbal  Behavioral:   [ ] Anxiety  [x ] Delirium [x ] Agitation [ ] Other  HEENT:  [ ]Normal   [ x]Dry mouth   [ ]ET Tube/Trach  [ ]Oral lesions  PULMONARY:   [ x]Clear [ ]Tachypnea  [x ]Audible excessive secretions - bradypnea  [ ]Rhonchi        [ ]Right [ ]Left [ ]Bilateral  [ ]Crackles        [ ]Right [ ]Left [ ]Bilateral  [ ]Wheezing     [ ]Right [ ]Left [ ]Bilateral  CARDIOVASCULAR:    [ ]Regular [ ]Irregular [x ]Tachy  [ ]Sami [ ]Murmur [ ]Other  GASTROINTESTINAL:  [ ]Soft  [x ]Distended   [ ]+BS  [ ]Non tender [ x]Tender  [ ]PEG [x ]OGT/ NGT   Last BM: some fecal liquid in colostomy bag  10-16-20 @ 07:01  -  10-17-20 @ 07:00  --------------------------------------------------------  OUT: 200 mL    10-17-20 @ 07:01  -  10-18-20 @ 07:00  --------------------------------------------------------  OUT: 0 mL    10-18-20 @ 07:01  -  10-19-20 @ 07:00  --------------------------------------------------------  OUT: 0 mL       GENITOURINARY:  [ ]Normal [ ] Incontinent   [ x]Oliguria/Anuria   [ x]Szymanski  MUSCULOSKELETAL:   [ ]Normal   [ ]Weakness  [x ]Bed/Wheelchair bound [x ]Edema  NEUROLOGIC:   [ ]No focal deficits  [x ] Cognitive impairment  [ ] Dysphagia [ ]Dysarthria [ ] Paresis [ ]Other   SKIN:   [ ]Normal   [ x]Pressure ulcer(s) stage I sacrum [ ]Rash    CRITICAL CARE:  [ ] Shock Present  [ ]Septic [ ]Cardiogenic [ ]Neurologic [ ]Hypovolemic  [ ]  Vasopressors [ ]  Inotropes   [ ] Respiratory failure present  [ ] Acute  [ ] Chronic [ ] Hypoxic  [ ] Hypercarbic [ ] Other  [ ] Other organ failure     LABS:            RADIOLOGY & ADDITIONAL STUDIES:    Protein Calorie Malnutrition Present: [ x] yes [ ] no  [ x] PPSV2 < or = 30%  [ x] significant weight loss [x ] poor nutritional intake [x anasarca [ x] catabolic state Albumin, Serum: 1.1 g/dL (10-17-20 @ 04:10)      REFERRALS:   [ ]Chaplaincy  [ x] Hospice  [ ]Child Life  [ ]Social Work  [ ]Case management [ ]Holistic Therapy   Goals of Care Document: AVA Muniz (10-19-20 @ 15:04)  Goals of Care Conversation:   Participants:  · Participants  Family  · ChildMesha Goyal (ren)  · Relative  Granddaughter and niece also at bedside    Advance Directives:  · Caregiver:  yes  · Name  Mesha  · Phone Number  [phone]    Conversation Discussion:  · Conversation  Diagnosis; Prognosis; MOLST Discussed  · Conversation Details  spoke with daughter Mesha earlier via phone and again this afternoon at bedside with her daughter and niece present. Keiko Hopper had a discussion with them prior to my arrival at bedside, details from combined discussion by Dr. Aden and me reviewed with Mesha. Explained overall care plan with medications being continued and pressors and blood draws being discontinued, that the focus now would solely be on comfort and controlling symptoms as they arise. Mesha asked if there was a time frame of how long her mother had, explained at best we could estimate hours to days but that there are certain clinical cues we look for to help determine when things are progressing. Shared with them that pt had some of these cues but not all of them so hard to estimate and that she could also change rapidly as she is critically ill. Comfort provided.    What Matters Most To Patient and Family:  · What matters most to patient and family  no suffering    Personal Advance Directives Treatment Guidelines:   Treatment Guidelines:  · Decision Maker  Surrogate  · Treatment Guidelines  DNR Order; Comfort measures only    MOLST:  · Completed  19-Oct-2020    Location of Discussion:   Duration of Advanced Care Planning Meeting:  · Time spent (in minutes)  30    Location of Discussion:  · Location of discussion  Face to face      Electronic Signatures:  Yaneth Muniz)  (Signed 19-Oct-2020 15:11)  	Authored: Goals of Care Conversation, Personal Advance Directives Treatment Guidelines, Location of Discussion      Last Updated: 19-Oct-2020 15:11 by Yaneth Muniz)

## 2020-10-21 NOTE — DISCHARGE NOTE PROVIDER - HOSPITAL COURSE
80F PMH HTN, DM, Colon Ca s/p colostomy (4/2019) s/p chemo w/ stage 4 recurrence. 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 Admitted to ICU with severe sepsis with septic shock, high grade SBO, ARF with ATN, demand ischemia. Course with R foot ischemia.     - pt transitioned to comfort measures only  - cont with dilaudid IVP as needed for pain/discomfort  - can transition to IV dilaudid if necessary   - cont with scopolamine patch for secretions  - suction as needed  - pt in dieing process, hypotensive, bradypneic at times  - cont supportive care for family     Patient  to transfer to hospice 80F PMH HTN, DM, Colon Ca s/p colostomy (4/2019) s/p chemo w/ stage 4 recurrence. 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 Admitted to ICU with severe sepsis with septic shock, high grade SBO, ARF with ATN, demand ischemia. Course with R foot ischemia.     - pt transitioned to comfort measures only  - cont with dilaudid IVP as needed for pain/discomfort  - can transition to IV dilaudid drip if necessary   - cont with scopolamine patch for secretions  - ativan as needed for agitation, restlessness  - suction as needed  - pt in dieing process, hypotensive, bradypneic at times  - cont supportive care for family     Patient  to transfer to hospice. Accepted by inpatient hospice. Family also consents to in patient hospice.

## 2020-10-21 NOTE — PROGRESS NOTE ADULT - PROBLEM SELECTOR PLAN 4
low albumin   NPO due to obstruction   not a good candidate for parenteral nutrition as it would be a lot of volume and an added risk of infection low albumin   NPO due to obstruction   poor stores due to catabolic cancer state

## 2020-10-21 NOTE — PROGRESS NOTE ADULT - PROBLEM SELECTOR PLAN 6
pressors discontinued as pt has not improved and has entered her dying process
pressors discontinued as pt has not improved and has entered her dying process
remains on levophed  antibiotics

## 2020-10-21 NOTE — PROGRESS NOTE ADULT - PROBLEM SELECTOR PLAN 1
scheduled dilaudid 0.5q 4 with 1mg q 1 for breakthrough symptoms scheduled dilaudid 0.5q 4 with 1mg q 1 for breakthrough symptoms  today pt showing signs of terminal delirium will add ativan 0.5mg PRN

## 2020-10-21 NOTE — PROGRESS NOTE ADULT - PROBLEM SELECTOR PLAN 7
right foot mottled and cold   poor surgical candidate overall   no intervention offered or sought

## 2020-10-21 NOTE — DISCHARGE NOTE PROVIDER - NSDCCPCAREPLAN_GEN_ALL_CORE_FT
PRINCIPAL DISCHARGE DIAGNOSIS  Diagnosis: Small bowel obstruction  Assessment and Plan of Treatment: comfort measures      SECONDARY DISCHARGE DIAGNOSES  Diagnosis: Iliac artery thrombosis, right  Assessment and Plan of Treatment:     Diagnosis: H/O colon cancer, stage IV  Assessment and Plan of Treatment: H/O colon cancer, stage IV    Diagnosis: Small bowel obstruction  Assessment and Plan of Treatment:

## 2020-10-21 NOTE — PROGRESS NOTE ADULT - PROBLEM SELECTOR PLAN 8
with malignant SBO, ostomy   terminally ill
with malignant SBO, ostomy   terminally ill
explained to daughter over the phone today that even with her other medical issues at the forefront right now, resuscitation of her mother who has a terminal illness of stage IV cancer would not be medically advisable and should be strongly considered

## 2020-10-21 NOTE — PROGRESS NOTE ADULT - PROBLEM SELECTOR PLAN 2
continue NGT to suction  no interventions planned   pt on comfort care
continue NGT to suction  no interventions planned   pt on comfort care
early Friday pt and daughter were amenable to surgical intervention but by afternoon had decided against it.  continue medical mgt of SBO- NGT, octreotide, decadron, pain relief, can use promotility agents with caution

## 2020-10-21 NOTE — DISCHARGE NOTE PROVIDER - CARE PROVIDER_API CALL
Liam Saxena  PHYSICIAN - [facility]  [address] [place] [id number]  Phone: [phone]  Fax: [phone]  Follow Up Time:

## 2020-10-21 NOTE — DISCHARGE NOTE PROVIDER - NSDCMRMEDTOKEN_GEN_ALL_CORE_FT
HYDROmorphone 1 mg/mL-NaCl 0.9% intravenous solution: 1 milligram(s) intravenous every 6 hours, As Needed  LORazepam:   scopolamine:

## 2020-10-21 NOTE — CHART NOTE - NSCHARTNOTEFT_GEN_A_CORE
ICU/CCU Transfer Note    Transfer from: ICU/ CCU  Transfer to: med/surg/ hospice  Accepting physican:  Case discussed with:       MICU COURSE:     ASSESSMENT & PLAN:   ==================    80F PMH HTN, DM, Colon Ca s/p colostomy (4/2019) s/p chemo w/ stage 4 recurrence. 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 Admitted to ICU with severe sepsis with septic shock, high grade SBO, ARF with ATN, demand ischemia. Course with R foot ischemia.     - pt transitioned to comfort measures only  - cont with dilaudid IVP as needed for pain/discomfort  - can transition to IV dilaudid if necessary   - cont with scopolamine patch for secretions  - suction as needed  - pt in dieing process, hypotensive, bradypneic at times  - hospice referral made  - cont supportive care for family     -reached out to hospice, attempting to reach family for transfer to hospice      VITALS  ========  Vital Signs Last 24 Hrs  T(C): 36.9 (21 Oct 2020 07:54), Max: 36.9 (20 Oct 2020 23:14)  T(F): 98.4 (21 Oct 2020 07:54), Max: 98.4 (20 Oct 2020 23:14)  HR: 103 (21 Oct 2020 08:00) (100 - 107)  BP: 72/37 (21 Oct 2020 08:00) (66/30 - 83/41)  BP(mean): 44 (21 Oct 2020 08:00) (40 - 50)  RR: 15 (21 Oct 2020 08:00) (8 - 19)  SpO2: 87% (21 Oct 2020 08:00) (81% - 96%)  I&O's Summary    20 Oct 2020 07:01  -  21 Oct 2020 07:00  --------------------------------------------------------  IN: 0 mL / OUT: 0 mL / NET: 0 mL          LABS ICU/CCU Transfer Note    Transfer from: ICU/ CCU  Transfer to: med/surg/ hospice  Accepting physican: Dr Whitmore   Case discussed with: Dr Zaldivar       [facility]     ASSESSMENT & PLAN:   ==================    80F PMH HTN, DM, Colon Ca s/p colostomy (4/2019) s/p chemo w/ stage 4 recurrence. 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 Admitted to ICU with severe sepsis with septic shock, high grade SBO, ARF with ATN, demand ischemia. Course with R foot ischemia.     - pt transitioned to comfort measures only  - cont with dilaudid IVP as needed for pain/discomfort  - can transition to IV dilaudid if necessary   - cont with scopolamine patch for secretions  - suction as needed  - pt in dieing process, hypotensive, bradypneic at times  - hospice referral made  - cont supportive care for family     -reached out to hospice, attempting to reach family for transfer to hospice      VITALS  ========  Vital Signs Last 24 Hrs  T(C): 36.9 (21 Oct 2020 07:54), Max: 36.9 (20 Oct 2020 23:14)  T(F): 98.4 (21 Oct 2020 07:54), Max: 98.4 (20 Oct 2020 23:14)  HR: 103 (21 Oct 2020 08:00) (100 - 107)  BP: 72/37 (21 Oct 2020 08:00) (66/30 - 83/41)  BP(mean): 44 (21 Oct 2020 08:00) (40 - 50)  RR: 15 (21 Oct 2020 08:00) (8 - 19)  SpO2: 87% (21 Oct 2020 08:00) (81% - 96%)  I&O's Summary    20 Oct 2020 07:01  -  21 Oct 2020 07:00  --------------------------------------------------------  IN: 0 mL / OUT: 0 mL / NET: 0 mL          LABS

## 2020-10-21 NOTE — PROGRESS NOTE ADULT - PROBLEM SELECTOR PLAN 9
GOC as above, full comfort   pt is actively dying, family is aware. Pt's sister, niece, daughter and granddaughter have been visiting. Will follow up with them this morning via phone as pt is getting closer to passing. GOC as above, full comfort   pt is actively dying, family is aware. Pt's sister, niece, daughter and granddaughter have been visiting. Plan to transition to inpatient hospice

## 2020-10-21 NOTE — GOALS OF CARE CONVERSATION - ADVANCED CARE PLANNING - CONVERSATION DETAILS
Pt approved for In pt hospice. I spoke with the dtr and we discussed In pt hospice services. She is in agreement. She does not have access to a fax or email and will sign the consents when she arrives for her visit at the hospital this afternoon. Plan will be that pt will transfer to [facility] this afternoon. PCT and N Lily is aware. Will cont to f/u.       Chinyere Anderson RN
spoke with daughter Mesha earlier via phone and again this afternoon at bedside with her daughter and niece present. Keiko Hopper had a discussion with them prior to my arrival at bedside, details from combined discussion by Dr. Aden and me reviewed with Mesha. Explained overall care plan with medications being continued and pressors and blood draws being discontinued, that the focus now would solely be on comfort and controlling symptoms as they arise. Mesha asked if there was a time frame of how long her mother had, explained at best we could estimate hours to days but that there are certain clinical cues we look for to help determine when things are progressing. Shared with them that pt had some of these cues but not all of them so hard to estimate and that she could also change rapidly as she is critically ill. Comfort provided.
Spoke to daughter and pt's sister.    Discussed pt's multiorgan state and high grade SBO without intervention likely leading to ischemic bowel. Pt is diffusely mottled. R ischemic limb from arterial occlusion. In ARF no urine output.     Daughter aware that prognosis is grim and that death may be imminent.     We discussed comfort measures as her main priority is "no suffering for mom"    Pressors to be d/jerald, no further lab draws.     Dilaudid as needed for pain management.

## 2020-10-21 NOTE — PROGRESS NOTE ADULT - ASSESSMENT
81 y/o f with PMHx of HTN, DM Colon Ca Stage IV s/p Ex Lap w bowel resection and colostomy creation admitted to ICU with high grade SBO, FELICITAS, lactemia and septic shock. Palliative Care consulted for complex symptom management and GOC.

## 2020-10-23 ENCOUNTER — APPOINTMENT (OUTPATIENT)
Dept: INFUSION THERAPY | Facility: HOSPITAL | Age: 80
End: 2020-10-23

## 2020-10-28 ENCOUNTER — APPOINTMENT (OUTPATIENT)
Dept: INFUSION THERAPY | Facility: HOSPITAL | Age: 80
End: 2020-10-28

## 2020-10-30 ENCOUNTER — APPOINTMENT (OUTPATIENT)
Dept: INFUSION THERAPY | Facility: HOSPITAL | Age: 80
End: 2020-10-30

## 2020-11-02 ENCOUNTER — APPOINTMENT (OUTPATIENT)
Dept: HEMATOLOGY ONCOLOGY | Facility: CLINIC | Age: 80
End: 2020-11-02

## 2020-11-04 ENCOUNTER — APPOINTMENT (OUTPATIENT)
Dept: INFUSION THERAPY | Facility: HOSPITAL | Age: 80
End: 2020-11-04

## 2020-11-06 ENCOUNTER — APPOINTMENT (OUTPATIENT)
Dept: INFUSION THERAPY | Facility: HOSPITAL | Age: 80
End: 2020-11-06

## 2020-11-20 ENCOUNTER — NON-APPOINTMENT (OUTPATIENT)
Age: 80
End: 2020-11-20

## 2020-11-24 ENCOUNTER — NON-APPOINTMENT (OUTPATIENT)
Age: 80
End: 2020-11-24

## 2021-11-12 NOTE — ED ADULT NURSE NOTE - CHIEF COMPLAINT QUOTE
hx stage 4 colon ca, DM. per EMS daughter noted AMS since AM, unknown baseline. daughter  noted pt was not eating nor taking meds today. In triage pt alert to person, c/o back pain. . daughter cell [phone]. 22 G placed right hand by EMS no abdominal pain, no bloating, no constipation, no diarrhea, no nausea and no vomiting.

## 2022-01-27 NOTE — ED PROVIDER NOTE - CPE EDP [facility] NORM
Spoke with patient and advised of stable labs at this time.  Further discussion at follow-up visit normal...

## 2022-05-07 NOTE — DIETITIAN INITIAL EVALUATION ADULT. - OTHER INFO
Initial Dietitian Evaluation 2/2 to extended length of stay. 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 Patient was NPO x 7 days, started on clear liquids today. States tolerating diet well. Consuming good po intake of clear liquids without any GI distress (nausea, vomiting, diarrhea, constipation) or any difficulty chewing or swallowing. NKFA. Encouraged good po intake, and diet advancement process discussed. No

## 2023-03-28 NOTE — PROGRESS NOTE ADULT - NUTRITIONAL ASSESSMENT
This patient has been assessed with a concern for Malnutrition and has been determined to have a diagnosis/diagnoses of Moderate protein-calorie malnutrition.    This patient is being managed with:   Diet NPO-  Entered: Oct 17 2020  3:08AM    
AST/ALT 86/129, total bili 2  Trend LFT, check CK  If worsened LFT with normal CK, then will pursue further work up  CT abd/pelvis- pending

## 2023-04-17 NOTE — ED PROVIDER NOTE - FAMILY HISTORY
Plan for scar revision when patient returns. Detail Level: Detailed No pertinent family history in first degree relatives

## 2023-06-27 NOTE — ED ADULT TRIAGE NOTE - MODE OF ARRIVAL
Private Vehicle Elidel Counseling: Patient may experience a mild burning sensation during topical application. Elidel is not approved in children less than 2 years of age. There have been case reports of hematologic and skin malignancies in patients using topical calcineurin inhibitors although causality is questionable.

## 2023-07-27 NOTE — PROGRESS NOTE ADULT - PROBLEM SELECTOR PROBLEM 2
DM (diabetes mellitus) Carac Counseling:  I discussed with the patient the risks of Carac including but not limited to erythema, scaling, itching, weeping, crusting, and pain.

## 2025-04-28 NOTE — DIETITIAN INITIAL EVALUATION ADULT. - HEIGHT FOR BMI (FEET)
Procedure:  Level of Consciousness: [x]Alert [x]Oriented []Disoriented []Lethargic  Anxiety Level: [x]Calm []Anxious []Depressed []Other  Skin: [x]Warm [x]Dry []Cool []Moist []Intact []Other  Cardiovascular: []Palpitations: [x]Never []Occasionally []Frequently  Chest Pain: [x]No []Yes  Respiratory:  [x]Unlabored []Labored []Cough ([] Productive []Unproductive)  HCG Required: [x]No []Yes   Results: []Negative []Positive  Knowledge Level:        [x]Patient/Other verbalized understanding of pre-procedure instructions.        [x]Assessment of post-op care needs (transportation, responsible caregiver)        [x]Able to discuss health care problems and how to deal with it.  Factors that Affect Teaching:        Language Barrier: [x]No []Yes - why:        Hearing Loss:        [x]No []Yes            Corrective Device:  []Yes []No        Vision Loss:           [x]No []Yes            Corrective Device:  []Yes []No        Memory Loss:       [x]No []Yes            []Short Term []Long Term  Motivational Level:  []Asks Questions                  []Extremely Anxious       []Seems Interested               []Seems Uninterested                  [x]Denies need for Education  Risk for Injury:  [x]Patient oriented to person, place and time  []History of frequent falls/loss of balance  Nutritional:  []Change in appetite   []Weight Gain   []Weight Loss  Functional:  []Requires assistance with ADL's  
5